# Patient Record
Sex: FEMALE | Race: WHITE | NOT HISPANIC OR LATINO | Employment: UNEMPLOYED | URBAN - METROPOLITAN AREA
[De-identification: names, ages, dates, MRNs, and addresses within clinical notes are randomized per-mention and may not be internally consistent; named-entity substitution may affect disease eponyms.]

---

## 2017-02-08 PROBLEM — Z30.2 ENCOUNTER FOR STERILIZATION: Status: ACTIVE | Noted: 2017-02-08

## 2017-03-02 ENCOUNTER — ANESTHESIA EVENT (OUTPATIENT)
Dept: OBSTETRICS AND GYNECOLOGY | Facility: HOSPITAL | Age: 32
End: 2017-03-02
Payer: COMMERCIAL

## 2017-03-03 ENCOUNTER — ANESTHESIA (OUTPATIENT)
Dept: OBSTETRICS AND GYNECOLOGY | Facility: HOSPITAL | Age: 32
End: 2017-03-03
Payer: COMMERCIAL

## 2017-03-03 ENCOUNTER — SURGERY (OUTPATIENT)
Age: 32
End: 2017-03-03

## 2017-03-03 ENCOUNTER — HOSPITAL ENCOUNTER (INPATIENT)
Facility: HOSPITAL | Age: 32
LOS: 2 days | Discharge: HOME | End: 2017-03-05
Attending: OBSTETRICS & GYNECOLOGY | Admitting: OBSTETRICS & GYNECOLOGY
Payer: COMMERCIAL

## 2017-03-03 DIAGNOSIS — Z98.891 H/O: C-SECTION: ICD-10-CM

## 2017-03-03 LAB
ABO + RH BLD: NORMAL
BASOPHILS # BLD AUTO: 0.02 K/UL
BASOPHILS NFR BLD: 0.2 %
BLD GP AB SCN CELLS X3 SERPL QL: NORMAL
DIFFERENTIAL METHOD: ABNORMAL
EOSINOPHIL # BLD AUTO: 0.3 K/UL
EOSINOPHIL NFR BLD: 2.9 %
ERYTHROCYTE [DISTWIDTH] IN BLOOD BY AUTOMATED COUNT: 14.4 %
HCT VFR BLD AUTO: 42.4 %
HGB BLD-MCNC: 14.6 G/DL
LYMPHOCYTES # BLD AUTO: 2.1 K/UL
LYMPHOCYTES NFR BLD: 18.7 %
MCH RBC QN AUTO: 28.1 PG
MCHC RBC AUTO-ENTMCNC: 34.4 %
MCV RBC AUTO: 82 FL
MONOCYTES # BLD AUTO: 1 K/UL
MONOCYTES NFR BLD: 8.7 %
NEUTROPHILS # BLD AUTO: 7.8 K/UL
NEUTROPHILS NFR BLD: 69.1 %
PLATELET # BLD AUTO: 244 K/UL
PMV BLD AUTO: 8.9 FL
RBC # BLD AUTO: 5.19 M/UL
WBC # BLD AUTO: 11.31 K/UL

## 2017-03-03 PROCEDURE — 27200688 HC TRAY, SPINAL-HYPER/ ISOBARIC: Performed by: NURSE ANESTHETIST, CERTIFIED REGISTERED

## 2017-03-03 PROCEDURE — 25000003 PHARM REV CODE 250: Performed by: OBSTETRICS & GYNECOLOGY

## 2017-03-03 PROCEDURE — 63600175 PHARM REV CODE 636 W HCPCS: Performed by: NURSE ANESTHETIST, CERTIFIED REGISTERED

## 2017-03-03 PROCEDURE — 11000001 HC ACUTE MED/SURG PRIVATE ROOM

## 2017-03-03 PROCEDURE — 63600175 PHARM REV CODE 636 W HCPCS: Performed by: OBSTETRICS & GYNECOLOGY

## 2017-03-03 PROCEDURE — 25000003 PHARM REV CODE 250: Performed by: NURSE ANESTHETIST, CERTIFIED REGISTERED

## 2017-03-03 PROCEDURE — 88302 TISSUE EXAM BY PATHOLOGIST: CPT | Mod: 26,,,

## 2017-03-03 PROCEDURE — 63600175 PHARM REV CODE 636 W HCPCS: Performed by: ANESTHESIOLOGY

## 2017-03-03 PROCEDURE — 37000008 HC ANESTHESIA 1ST 15 MINUTES: Performed by: OBSTETRICS & GYNECOLOGY

## 2017-03-03 PROCEDURE — 86850 RBC ANTIBODY SCREEN: CPT

## 2017-03-03 PROCEDURE — 27200918 HC ALEXIS O RING

## 2017-03-03 PROCEDURE — 25000003 PHARM REV CODE 250: Performed by: ANESTHESIOLOGY

## 2017-03-03 PROCEDURE — 59514 CESAREAN DELIVERY ONLY: CPT | Mod: ,,, | Performed by: ANESTHESIOLOGY

## 2017-03-03 PROCEDURE — 37000009 HC ANESTHESIA EA ADD 15 MINS: Performed by: OBSTETRICS & GYNECOLOGY

## 2017-03-03 PROCEDURE — 0UB70ZZ EXCISION OF BILATERAL FALLOPIAN TUBES, OPEN APPROACH: ICD-10-PCS | Performed by: OBSTETRICS & GYNECOLOGY

## 2017-03-03 PROCEDURE — 85025 COMPLETE CBC W/AUTO DIFF WBC: CPT

## 2017-03-03 PROCEDURE — 88302 TISSUE EXAM BY PATHOLOGIST: CPT

## 2017-03-03 PROCEDURE — 51702 INSERT TEMP BLADDER CATH: CPT

## 2017-03-03 PROCEDURE — 36000680 HC C/S TUBAL LIGATION LEVEL I

## 2017-03-03 PROCEDURE — 86900 BLOOD TYPING SEROLOGIC ABO: CPT

## 2017-03-03 PROCEDURE — 86592 SYPHILIS TEST NON-TREP QUAL: CPT

## 2017-03-03 RX ORDER — ONDANSETRON 8 MG/1
8 TABLET, ORALLY DISINTEGRATING ORAL EVERY 8 HOURS PRN
Status: DISCONTINUED | OUTPATIENT
Start: 2017-03-03 | End: 2017-03-05 | Stop reason: HOSPADM

## 2017-03-03 RX ORDER — BISACODYL 10 MG
10 SUPPOSITORY, RECTAL RECTAL ONCE AS NEEDED
Status: ACTIVE | OUTPATIENT
Start: 2017-03-03 | End: 2017-03-03

## 2017-03-03 RX ORDER — AMOXICILLIN 250 MG
1 CAPSULE ORAL NIGHTLY PRN
Status: DISCONTINUED | OUTPATIENT
Start: 2017-03-03 | End: 2017-03-05 | Stop reason: HOSPADM

## 2017-03-03 RX ORDER — SODIUM CITRATE AND CITRIC ACID MONOHYDRATE 334; 500 MG/5ML; MG/5ML
30 SOLUTION ORAL ONCE
Status: COMPLETED | OUTPATIENT
Start: 2017-03-03 | End: 2017-03-03

## 2017-03-03 RX ORDER — SIMETHICONE 80 MG
1 TABLET,CHEWABLE ORAL EVERY 6 HOURS PRN
Status: DISCONTINUED | OUTPATIENT
Start: 2017-03-03 | End: 2017-03-05 | Stop reason: HOSPADM

## 2017-03-03 RX ORDER — PHENYLEPHRINE HYDROCHLORIDE 10 MG/ML
INJECTION INTRAVENOUS
Status: DISCONTINUED | OUTPATIENT
Start: 2017-03-03 | End: 2017-03-03

## 2017-03-03 RX ORDER — HYDROCORTISONE 25 MG/G
CREAM TOPICAL 3 TIMES DAILY PRN
Status: DISCONTINUED | OUTPATIENT
Start: 2017-03-03 | End: 2017-03-05 | Stop reason: HOSPADM

## 2017-03-03 RX ORDER — BUPIVACAINE HYDROCHLORIDE 7.5 MG/ML
INJECTION, SOLUTION INTRASPINAL
Status: DISCONTINUED | OUTPATIENT
Start: 2017-03-03 | End: 2017-03-03

## 2017-03-03 RX ORDER — ONDANSETRON 2 MG/ML
4 INJECTION INTRAMUSCULAR; INTRAVENOUS EVERY 12 HOURS PRN
Status: DISCONTINUED | OUTPATIENT
Start: 2017-03-03 | End: 2017-03-04

## 2017-03-03 RX ORDER — CEFAZOLIN SODIUM 2 G/50ML
2 SOLUTION INTRAVENOUS
Status: COMPLETED | OUTPATIENT
Start: 2017-03-03 | End: 2017-03-03

## 2017-03-03 RX ORDER — OXYTOCIN 10 [USP'U]/ML
INJECTION, SOLUTION INTRAMUSCULAR; INTRAVENOUS
Status: DISCONTINUED | OUTPATIENT
Start: 2017-03-03 | End: 2017-03-03

## 2017-03-03 RX ORDER — SODIUM CHLORIDE, SODIUM LACTATE, POTASSIUM CHLORIDE, CALCIUM CHLORIDE 600; 310; 30; 20 MG/100ML; MG/100ML; MG/100ML; MG/100ML
INJECTION, SOLUTION INTRAVENOUS CONTINUOUS
Status: DISCONTINUED | OUTPATIENT
Start: 2017-03-03 | End: 2017-03-03

## 2017-03-03 RX ORDER — DIPHENHYDRAMINE HYDROCHLORIDE 50 MG/ML
12.5 INJECTION INTRAMUSCULAR; INTRAVENOUS EVERY 4 HOURS PRN
Status: DISCONTINUED | OUTPATIENT
Start: 2017-03-03 | End: 2017-03-04

## 2017-03-03 RX ORDER — ONDANSETRON HYDROCHLORIDE 2 MG/ML
INJECTION, SOLUTION INTRAMUSCULAR; INTRAVENOUS
Status: DISCONTINUED | OUTPATIENT
Start: 2017-03-03 | End: 2017-03-03

## 2017-03-03 RX ORDER — OXYCODONE AND ACETAMINOPHEN 10; 325 MG/1; MG/1
1 TABLET ORAL EVERY 4 HOURS PRN
Status: DISCONTINUED | OUTPATIENT
Start: 2017-03-03 | End: 2017-03-05 | Stop reason: HOSPADM

## 2017-03-03 RX ORDER — HYDROMORPHONE HCL IN 0.9% NACL 6 MG/30 ML
PATIENT CONTROLLED ANALGESIA SYRINGE INTRAVENOUS CONTINUOUS
Status: DISCONTINUED | OUTPATIENT
Start: 2017-03-03 | End: 2017-03-04

## 2017-03-03 RX ORDER — IBUPROFEN 400 MG/1
800 TABLET ORAL EVERY 8 HOURS
Status: DISCONTINUED | OUTPATIENT
Start: 2017-03-04 | End: 2017-03-05 | Stop reason: HOSPADM

## 2017-03-03 RX ORDER — METOCLOPRAMIDE HYDROCHLORIDE 5 MG/ML
10 INJECTION INTRAMUSCULAR; INTRAVENOUS ONCE
Status: COMPLETED | OUTPATIENT
Start: 2017-03-03 | End: 2017-03-03

## 2017-03-03 RX ORDER — DIPHENHYDRAMINE HCL 25 MG
25 CAPSULE ORAL EVERY 4 HOURS PRN
Status: DISCONTINUED | OUTPATIENT
Start: 2017-03-03 | End: 2017-03-05 | Stop reason: HOSPADM

## 2017-03-03 RX ORDER — ADHESIVE BANDAGE
30 BANDAGE TOPICAL 2 TIMES DAILY PRN
Status: DISCONTINUED | OUTPATIENT
Start: 2017-03-04 | End: 2017-03-05 | Stop reason: HOSPADM

## 2017-03-03 RX ORDER — FENTANYL CITRATE 50 UG/ML
INJECTION, SOLUTION INTRAMUSCULAR; INTRAVENOUS
Status: DISCONTINUED | OUTPATIENT
Start: 2017-03-03 | End: 2017-03-03

## 2017-03-03 RX ORDER — FAMOTIDINE 10 MG/ML
20 INJECTION INTRAVENOUS ONCE
Status: COMPLETED | OUTPATIENT
Start: 2017-03-03 | End: 2017-03-03

## 2017-03-03 RX ORDER — SODIUM CHLORIDE, SODIUM LACTATE, POTASSIUM CHLORIDE, CALCIUM CHLORIDE 600; 310; 30; 20 MG/100ML; MG/100ML; MG/100ML; MG/100ML
INJECTION, SOLUTION INTRAVENOUS CONTINUOUS
Status: ACTIVE | OUTPATIENT
Start: 2017-03-03 | End: 2017-03-03

## 2017-03-03 RX ORDER — NALOXONE HCL 0.4 MG/ML
0.02 VIAL (ML) INJECTION
Status: DISCONTINUED | OUTPATIENT
Start: 2017-03-03 | End: 2017-03-04

## 2017-03-03 RX ORDER — KETOROLAC TROMETHAMINE 30 MG/ML
30 INJECTION, SOLUTION INTRAMUSCULAR; INTRAVENOUS EVERY 6 HOURS
Status: COMPLETED | OUTPATIENT
Start: 2017-03-03 | End: 2017-03-04

## 2017-03-03 RX ORDER — OXYCODONE AND ACETAMINOPHEN 5; 325 MG/1; MG/1
1 TABLET ORAL EVERY 4 HOURS PRN
Status: DISCONTINUED | OUTPATIENT
Start: 2017-03-03 | End: 2017-03-05 | Stop reason: HOSPADM

## 2017-03-03 RX ORDER — OXYTOCIN/RINGER'S LACTATE 20/1000 ML
41.65 PLASTIC BAG, INJECTION (ML) INTRAVENOUS CONTINUOUS
Status: ACTIVE | OUTPATIENT
Start: 2017-03-03 | End: 2017-03-03

## 2017-03-03 RX ORDER — DOCUSATE SODIUM 100 MG/1
200 CAPSULE, LIQUID FILLED ORAL 2 TIMES DAILY
Status: DISCONTINUED | OUTPATIENT
Start: 2017-03-03 | End: 2017-03-05 | Stop reason: HOSPADM

## 2017-03-03 RX ORDER — MISOPROSTOL 200 UG/1
800 TABLET ORAL
Status: DISCONTINUED | OUTPATIENT
Start: 2017-03-03 | End: 2017-03-03

## 2017-03-03 RX ADMIN — BUPIVACAINE HYDROCHLORIDE IN DEXTROSE 1.6 ML: 7.5 INJECTION, SOLUTION SUBARACHNOID at 07:03

## 2017-03-03 RX ADMIN — KETOROLAC TROMETHAMINE 30 MG: 30 INJECTION, SOLUTION INTRAMUSCULAR at 05:03

## 2017-03-03 RX ADMIN — CEFAZOLIN SODIUM 2 G: 2 SOLUTION INTRAVENOUS at 07:03

## 2017-03-03 RX ADMIN — OXYTOCIN 30 UNITS: 10 INJECTION, SOLUTION INTRAMUSCULAR; INTRAVENOUS at 08:03

## 2017-03-03 RX ADMIN — FENTANYL CITRATE 40 MCG: 50 INJECTION INTRAMUSCULAR; INTRAVENOUS at 08:03

## 2017-03-03 RX ADMIN — OXYTOCIN 10 UNITS: 10 INJECTION, SOLUTION INTRAMUSCULAR; INTRAVENOUS at 08:03

## 2017-03-03 RX ADMIN — KETOROLAC TROMETHAMINE 30 MG: 30 INJECTION, SOLUTION INTRAMUSCULAR at 12:03

## 2017-03-03 RX ADMIN — FENTANYL CITRATE 10 MCG: 50 INJECTION INTRAMUSCULAR; INTRAVENOUS at 07:03

## 2017-03-03 RX ADMIN — SODIUM CHLORIDE, SODIUM LACTATE, POTASSIUM CHLORIDE, AND CALCIUM CHLORIDE: .6; .31; .03; .02 INJECTION, SOLUTION INTRAVENOUS at 08:03

## 2017-03-03 RX ADMIN — DOCUSATE SODIUM 200 MG: 100 CAPSULE, LIQUID FILLED ORAL at 08:03

## 2017-03-03 RX ADMIN — SODIUM CHLORIDE, SODIUM LACTATE, POTASSIUM CHLORIDE, AND CALCIUM CHLORIDE: .6; .31; .03; .02 INJECTION, SOLUTION INTRAVENOUS at 06:03

## 2017-03-03 RX ADMIN — FENTANYL CITRATE 50 MCG: 50 INJECTION INTRAMUSCULAR; INTRAVENOUS at 08:03

## 2017-03-03 RX ADMIN — ONDANSETRON 4 MG: 2 INJECTION, SOLUTION INTRAMUSCULAR; INTRAVENOUS at 08:03

## 2017-03-03 RX ADMIN — Medication: at 09:03

## 2017-03-03 RX ADMIN — SODIUM CITRATE AND CITRIC ACID MONOHYDRATE 30 ML: 500; 334 SOLUTION ORAL at 06:03

## 2017-03-03 RX ADMIN — PHENYLEPHRINE HYDROCHLORIDE 100 MCG: 10 INJECTION INTRAVENOUS at 07:03

## 2017-03-03 RX ADMIN — FAMOTIDINE 20 MG: 10 INJECTION, SOLUTION INTRAVENOUS at 06:03

## 2017-03-03 RX ADMIN — OXYTOCIN 20 UNITS: 10 INJECTION, SOLUTION INTRAMUSCULAR; INTRAVENOUS at 08:03

## 2017-03-03 RX ADMIN — SODIUM CHLORIDE, SODIUM LACTATE, POTASSIUM CHLORIDE, AND CALCIUM CHLORIDE 1000 ML: .6; .31; .03; .02 INJECTION, SOLUTION INTRAVENOUS at 05:03

## 2017-03-03 RX ADMIN — METOCLOPRAMIDE 10 MG: 5 INJECTION, SOLUTION INTRAMUSCULAR; INTRAVENOUS at 06:03

## 2017-03-03 RX ADMIN — DOCUSATE SODIUM 200 MG: 100 CAPSULE, LIQUID FILLED ORAL at 09:03

## 2017-03-03 RX ADMIN — PHENYLEPHRINE HYDROCHLORIDE 100 MCG: 10 INJECTION INTRAVENOUS at 08:03

## 2017-03-03 NOTE — L&D DELIVERY NOTE
Section Procedure Note    Indications: previous uterine incision LTCS x 2, declines , desires permanent sterility    Pre-operative Diagnosis: 39 week 3 day pregnancy.    Post-operative Diagnosis: same    Surgeon: My Villafuerte     Assistants: Kaden    Anesthesia: Spinal anesthesia    ASA Class: 1    Procedure Details   The patient was seen in the Holding Room. The risks, benefits, complications, treatment options, and expected outcomes were discussed with the patient.  The patient concurred with the proposed plan, giving informed consent.  The site of surgery properly noted/marked. The patient was taken to Operating Room # 1, identified as Katelin Head and the procedure verified as  Delivery. A Time Out was held and the above information confirmed.    After induction of anesthesia, the patient was draped and prepped in the usual sterile manner. A Pfannenstiel incision was made and carried down through the subcutaneous tissue to the fascia. Fascial incision was made and extended transversely. The fascia was  from the underlying rectus tissue superiorly and inferiorly. The peritoneum was identified and entered. Peritoneal incision was extended longitudinally.  A low transverse uterine incision was made. Delivered from vertex presentation was a male with Apgar scores of 9 at one minute and 9 at five minutes. After the umbilical cord was clamped and cut cord blood was obtained for evaluation. The placenta was removed intact and appeared normal. The uterine outline, tubes and ovaries appeared normal. The uterine incision was closed with running locked sutures of #1 chromic. Hemostasis was observed. Attention was turned to the left fallopian tube which was followed out to the fimbriated end.  It was elevated with a Pompey clamp.  A 3 cm segment of tube was doubly suture ligated with 2-0 chromic and excised.  In a similar fashion, the right tube was ligated.  Lavage was carried  out until clear. The peritoneum was then reapproximated with 2-0 vicryl.  The fascia was then reapproximated with running sutures of 0 PDS. The subcutaneous tissue was reapproximated with 3-0 plain gut.  The skin was reapproximated with staples.    Instrument, sponge, and needle counts were correct prior the abdominal closure and at the conclusion of the case.     Findings:  Viable male infant in cephalic presentation, wt pending, Apgars 9.9, normal uterus, tubes, ovaries, dense adhesions of the fascia to the underlying rectus muscles.      Estimated Blood Loss:  600 cc           UOP: 400 cc, clear           Total IV Fluids: 2000 ml           Specimens: bilateral segments of fallopian tubes           Complications:  None; patient tolerated the procedure well.           Disposition: PACU - hemodynamically stable.           Condition: stable

## 2017-03-03 NOTE — LACTATION NOTE
"This note was copied from a baby's chart.     03/03/17 0910   Maternal Infant Assessment   Breast Density Bilateral:;soft   Areola Bilateral:;firm   Nipple(s) Bilateral:;graspable;everted   Infant Assessment   Sucking Reflex present   Rooting Reflex present   Swallow Reflex present   LATCH Score   Latch 2-->grasps breast, tongue down, lips flanged, rhythmic sucking   Audible Swallowing 1-->a few with stimulation   Type Of Nipple 2-->everted (after stimulation)   Comfort (Breast/Nipple) 2-->soft/nontender   Hold (Positioning) 0-->full assist (staff holds infant at breast)   Score (less than 7 for 2/more consecutive times, consult Lactation Consultant) 7   Maternal Infant Feeding   Maternal Emotional State assist needed;relaxed   Infant Positioning clutch/"football"   Signs of Milk Transfer audible swallow;infant jaw motion present   Presence of Pain no   Time Spent (min) 15-30 min   Latch Assistance yes   Breastfeeding Education adequate infant intake;adequate milk volume;importance of skin-to-skin contact    Following Delivery yes   Breastfeeding History   Breastfeeding History yes   Previous Exclusive Breastfeeding yes   Previous Breastfeeding Success successful   Duration of Previous Breastfeeding 2 months   Infant First Feeding   Infant First Feeding breastfeeding   Breastfeeding Start Date 03/03/17   Breastfeeding Start Time 0910   Skin-to-Skin Contact Maintained   Breastfeeding breastfeeding, bilateral   Breastfeeding Left Side (min) 10 Min  (continues to nurse)   Breastfeeding Right Side (min) 10 Min   Feeding Infant   Feeding Readiness Cues eager;hand to mouth movements;rooting;smacking;crying   Feeding Tolerance/Success adequate pause for breath;alert for feeding;coordinated suck;coordinated swallow;strong suck   Effective Latch During Feeding yes   Audible Swallow yes   Suck/Swallow Coordination present   Skin-to-Skin Contact During Feeding yes   Lactation Referrals   Lactation Consult " Breastfeeding assessment;Initial assessment;Knowledge deficit   Lactation Interventions   Attachment Promotion breastfeeding assistance provided;counseling provided;environment adjusted;face-to-face positioning promoted;family involvement promoted;infant-mother separation minimized;privacy provided;role responsibility promoted;rooming-in promoted;skin-to-skin contact encouraged   Latch Promotion positioning assisted;infant moved to breast   Infant fussy and aggressive at breast; Fed on and off on left breast for 10 min then switched to right; On and off on right breast as well; Unlatches but re-latches with little assistance; Mother denies pain or discomfort with nursing; Reviewed breastfeeding basics with parents; Encouraged to feed infant on cue, at least 8 or more times in 24 hours; Phone number provided; Encouraged to call lactation for needs or assistance; Verbalized understanding with good recall

## 2017-03-03 NOTE — TRANSFER OF CARE
"Anesthesia Transfer of Care Note    Patient: Katelin Head    Procedure(s) Performed: Procedure(s) (LRB):  DELIVERY- SECTION WITH TUBAL (N/A)    Patient location: Labor and Delivery    Anesthesia Type: spinal    Transport from OR: Transported from OR on room air with adequate spontaneous ventilation    Post pain: adequate analgesia    Post assessment: no apparent anesthetic complications    Post vital signs: stable    Level of consciousness: awake, alert and oriented    Nausea/Vomiting: no nausea/vomiting    Complications: none          Last vitals:   Visit Vitals    BP (!) 153/64    Pulse 94    Temp 36.7 °C (98.1 °F) (Oral)    Resp 16    Ht 5' 8" (1.727 m)    Wt 104.3 kg (230 lb)    LMP 2016    SpO2 98%    Breastfeeding No    BMI 34.97 kg/m2     "

## 2017-03-03 NOTE — ANESTHESIA PREPROCEDURE EVALUATION
2017  Pre-operative evaluation for Procedure(s) (LRB):  DELIVERY- SECTION WITH TUBAL (N/A)    Katelin Head is a 31 y.o. female   OB History      Para Term  AB TAB SAB Ectopic Multiple Living    4 2 2  1  1   2            Patient Active Problem List   Diagnosis    Previous  delivery affecting pregnancy    wants BTL    H/O:        Review of patient's allergies indicates:  No Known Allergies    No current facility-administered medications on file prior to encounter.      Current Outpatient Prescriptions on File Prior to Encounter   Medication Sig Dispense Refill    PNV76-iron,carb&glu-FA-DSS-dha (CITRANATAL DHA, ALGAL OIL,) 27 mg iron-1 mg -50 mg-250 mg Cmpk Take 1 tablet by mouth once daily. 60 each 11    promethazine (PHENERGAN) 12.5 MG Tab Take 1 tablet (12.5 mg total) by mouth every 4 (four) hours as needed. 60 tablet 3       Past Surgical History:   Procedure Laterality Date     SECTION      x2       Social History     Social History    Marital status:      Spouse name: N/A    Number of children: N/A    Years of education: N/A     Occupational History    Not on file.     Social History Main Topics    Smoking status: Never Smoker    Smokeless tobacco: Not on file    Alcohol use No    Drug use: Not on file    Sexual activity: Yes     Birth control/ protection: Condom     Other Topics Concern    Not on file     Social History Narrative         Vital Signs Range (Last 24H):  Temp:  [36.4 °C (97.6 °F)]   Pulse:  []   Resp:  [18]   BP: (134-135)/(74-82)   SpO2:  [96 %-97 %]       CBC:   Recent Labs      17   0520   WBC  11.31   RBC  5.19   HGB  14.6   HCT  42.4   PLT  244   MCV  82   MCH  28.1   MCHC  34.4         [facility] Anesthesia Evaluation    I have reviewed the Patient Summary Reports.     I have reviewed the Medications.      Review of Systems  Anesthesia Hx:  No problems with previous Anesthesia Denies Hx of Anesthetic complications  History of prior surgery of interest to airway management or planning: Denies Family Hx of Anesthesia complications.   Denies Personal Hx of Anesthesia complications.   Social:  No Alcohol Use, Non-Smoker    Hematology/Oncology:  Hematology Normal   Oncology Normal     EENT/Dental:EENT/Dental Normal   Cardiovascular:  Cardiovascular Normal Exercise tolerance: good     Pulmonary:  Pulmonary Normal    Renal/:  Renal/ Normal     Hepatic/GI:  Hepatic/GI Normal    Neurological:  Neurology Normal    Endocrine:  Endocrine Normal    Psych:  Psychiatric Normal           Physical Exam  General:  Well nourished, Obesity    Airway/Jaw/Neck:  Airway Findings: Mouth Opening: Normal Tongue: Normal  General Airway Assessment: Adult, Average  Mallampati: II  TM Distance: Normal, at least 6 cm  Jaw/Neck Findings:  Neck ROM: Normal ROM      Dental:  Dental Findings: In tact   Chest/Lungs:  Chest/Lungs Findings: Clear to auscultation     Heart/Vascular:  Heart Findings: Rhythm: Regular Rhythm        Mental Status:  Mental Status Findings:  Alert and Oriented, Cooperative         Anesthesia Plan  Type of Anesthesia, risks & benefits discussed:  Anesthesia Type:  spinal  Patient's Preference:   Intra-op Monitoring Plan: standard ASA monitors  Intra-op Monitoring Plan Comments:   Post Op Pain Control Plan:   Post Op Pain Control Plan Comments:   Induction:    Beta Blocker:  Patient is not currently on a Beta-Blocker (No further documentation required).       Informed Consent: Patient understands risks and agrees with Anesthesia plan.  Questions answered. Anesthesia consent signed with patient.  ASA Score: 2     Day of Surgery Review of History & Physical:    H&P update referred to the surgeon.         Ready For Surgery From Anesthesia Perspective.

## 2017-03-03 NOTE — ANESTHESIA PROCEDURE NOTES
Spinal    Diagnosis: IUP  Patient location during procedure: OR  Start time: 3/3/2017 7:43 AM  Timeout: 3/3/2017 7:40 AM  End time: 3/3/2017 7:44 AM  Staffing  Anesthesiologist: NOLAN FONTANA  Performed by: anesthesiologist   Preanesthetic Checklist  Completed: patient identified, site marked, surgical consent, pre-op evaluation, timeout performed, IV checked, risks and benefits discussed and monitors and equipment checked  Spinal Block  Patient position: sitting  Prep: ChloraPrep  Patient monitoring: heart rate  Approach: midline  Location: L3-4  Injection technique: single shot  CSF Fluid: clear free-flowing CSF  Needle  Needle type: pencil-tip   Needle gauge: 25 G  Needle length: 3.5 in  Additional Documentation: incremental injection, no paresthesia on injection and negative aspiration for heme  Needle localization: anatomical landmarks  Assessment  Sensory level: T6   Dermatomal levels determined by pinch or prick  Ease of block: easy  Patient's tolerance of the procedure: no complaints and comfortable throughout block  Medications:  Bolus administered: 1.6 mL of with dextrose and 0.75 bupivacaine  Epinephrine added: none  Opioid administered: 10 mcg of   fentanyl

## 2017-03-03 NOTE — H&P
History and Physical - Obstetrics    Subjective:     Patient is a 31 y.o.  @ 39w2d gestational age with an Estimated Date of Delivery: 3/7/17, who presents with the complaint of: nothing. Fetal Movement: normal.    Her current obstetrical history is significant for h/o  x2, and desire for sterility.    Review of Systems  Nine system ROS negative except for HPI    PTA Medications   Medication Sig    PNV76-iron,carb&glu-FA-DSS-dha (CITRANATAL DHA, ALGAL OIL,) 27 mg iron-1 mg -50 mg-250 mg Cmpk Take 1 tablet by mouth once daily.    promethazine (PHENERGAN) 12.5 MG Tab Take 1 tablet (12.5 mg total) by mouth every 4 (four) hours as needed.       Review of patient's allergies indicates:  No Known Allergies     History reviewed. No pertinent past medical history.    Past Surgical History:   Procedure Laterality Date     SECTION      x2       OB History      Para Term  AB TAB SAB Ectopic Multiple Living    4 2 2  1  1   2      SABx1    Social History     Social History    Marital status:      Spouse name: N/A    Number of children: N/A    Years of education: N/A     Occupational History    Not on file.     Social History Main Topics    Smoking status: Never Smoker    Smokeless tobacco: Not on file    Alcohol use No    Drug use: Not on file    Sexual activity: Yes     Birth control/ protection: Condom     Other Topics Concern    Not on file     Social History Narrative       Family History   Problem Relation Age of Onset    Breast cancer Neg Hx     Colon cancer Neg Hx     Ovarian cancer Neg Hx        Objective:   Vital Signs (Most Recent)  Temp: 97.8 °F (36.6 °C) (17)  Pulse: 88 (17)  Resp: 18 (17)  BP: 118/72 (17 0720)  SpO2: 95 % (17)  Fetal Heart Tones: 150    General: no acute distress, alert and oriented to person, place and time  Abdomen: gravid, no palpable contractions  Incision(s): Pfan.  Extremities:  calves non-tender to palpation, no calf edema, erythema or palpable cords  Cervix: deferred    Laboratory: see results console    Assessment:     30 yo  @ 39w2d gestational age with h/o  x2 for repeat  and BTL    Plan:     To the OR  Ancef/SCDs

## 2017-03-03 NOTE — PROGRESS NOTES
Patient transferred to PP room by stretcher. Moved to PP bed with little assistance, tolerated well. Rebekah care provided, clean pads placed. Navarro emptied. PCA settings verified with 2nd RN. DUSTY Rogers RN to bedside for handoff. All belongings present at time of transfer.

## 2017-03-03 NOTE — PLAN OF CARE
Problem: Patient Care Overview  Goal: Plan of Care Review  Outcome: Ongoing (interventions implemented as appropriate)  VSS. Fundas and lochia WNL.  Patient resting comfortably in bed, voiding clear yellow urine through lock. Tolerating clear liquid diet. Dilaudid PCA as needed and toradol every 6 hours for pain.

## 2017-03-03 NOTE — ANESTHESIA POSTPROCEDURE EVALUATION
"Anesthesia Post Evaluation    Patient: Katelin Head    Procedure(s) Performed: Procedure(s) (LRB):  DELIVERY- SECTION WITH TUBAL (N/A)    Final Anesthesia Type: spinal  Patient location during evaluation: labor & delivery  Patient participation: Yes- Able to Participate  Level of consciousness: awake and alert and oriented  Post-procedure vital signs: reviewed and stable  Pain management: adequate  Airway patency: patent  PONV status at discharge: No PONV  Anesthetic complications: no      Cardiovascular status: blood pressure returned to baseline and hemodynamically stable  Respiratory status: unassisted, spontaneous ventilation and room air  Hydration status: euvolemic  Follow-up not needed.        Visit Vitals    /69 (BP Location: Left arm, Patient Position: Lying, BP Method: Automatic)    Pulse 85    Temp 36.4 °C (97.5 °F) (Oral)    Resp 16    Ht 5' 8" (1.727 m)    Wt 104.3 kg (230 lb)    LMP 2016    SpO2 98%    Breastfeeding Unknown    BMI 34.97 kg/m2       Pain/Sonya Score: Pain Rating Prior to Med Admin: 5 (3/3/2017 12:46 PM)  Pain Rating Post Med Admin: 3 (3/3/2017  1:15 PM)      "

## 2017-03-03 NOTE — IP AVS SNAPSHOT
[place] [address] [place] [address]  Phone: [phone]           Patient Discharge Instructions     Our goal is to set you up for success. This packet includes information on your condition, medications, and your home care. It will help you to care for yourself so you don't get sicker and need to go back to the hospital.     Please ask your nurse if you have any questions.        There are many details to remember when preparing to leave the hospital. Here is what you will need to do:    1. Take your medicine. If you are prescribed medications, review your Medication List in the following pages. You may have new medications to  at the pharmacy and others that you'll need to stop taking. Review the instructions for how and when to take your medications. Talk with your doctor or nurses if you are unsure of what to do.     2. Go to your follow-up appointments. Specific follow-up information is listed in the following pages. Your may be contacted by a transition nurse or clinical provider about future appointments. Be sure we have all of the phone numbers to reach you, if needed. Please contact your provider's office if you are unable to make an appointment.     3. Watch for warning signs. Your doctor or nurse will give you detailed warning signs to watch for and when to call for assistance. These instructions may also include educational information about your condition. If you experience any of warning signs to your health, call your doctor.               ** Verify the list of medication(s) below is accurate and up to date. Carry this with you in case of emergency. If your medications have changed, please notify your healthcare provider.             Medication List      START taking these medications        Additional Info                      ibuprofen 800 MG tablet   Commonly known as:  ADVIL,MOTRIN   Quantity:  40 tablet   Refills:  1   Dose:  800 mg    Last time this  was given:  800 mg on 3/5/2017  5:48 AM   Instructions:  Take 1 tablet (800 mg total) by mouth every 8 (eight) hours.     Begin Date    AM    Noon    PM    Bedtime       oxycodone-acetaminophen 5-325 mg per tablet   Commonly known as:  PERCOCET   Quantity:  50 tablet   Refills:  0   Dose:  1 tablet    Last time this was given:  1 tablet on 3/5/2017  5:48 AM   Instructions:  Take 1 tablet by mouth every 4 (four) hours as needed.     Begin Date    AM    Noon    PM    Bedtime         CONTINUE taking these medications        Additional Info                      PNV76-iron,carb,glu-FA-DSS-dha 27 mg iron-1 mg -50 mg-250 mg Cmpk   Commonly known as:  CITRANATAL DHA (ALGAL OIL)   Quantity:  60 each   Refills:  11   Dose:  1 tablet    Instructions:  Take 1 tablet by mouth once daily.     Begin Date    AM    Noon    PM    Bedtime       promethazine 12.5 MG Tab   Commonly known as:  PHENERGAN   Quantity:  60 tablet   Refills:  3   Dose:  12.5 mg    Instructions:  Take 1 tablet (12.5 mg total) by mouth every 4 (four) hours as needed.     Begin Date    AM    Noon    PM    Bedtime            Where to Get Your Medications      These medications were sent to [facility] [address]  [place] [address] [facility]  [address] [facility] [place] [address]     Phone:  [phone]     ibuprofen 800 MG tablet    oxycodone-acetaminophen 5-325 mg per tablet                  Please bring to all follow up appointments:    1. A copy of your discharge instructions.  2. All medicines you are currently taking in their original bottles.  3. Identification and insurance card.    Please arrive 15 minutes ahead of scheduled appointment time.    Please call 24 hours in advance if you must reschedule your appointment and/or time.        Your Scheduled Appointments     Mar 10, 2017  8:45 AM CST   Post Delivery with My Garg MD   [facility]    [address] [place] [id number] [phone]               Follow-up Information     Follow up with My Villafuerte MD In 1 week.    Specialty:  Obstetrics and Gynecology    Contact information:    [facility]  [address]  [place]  [phone]            Discharge Instructions       After a Cesearean Birth    General Discharge Instructions  · May follow a regular diet, unless otherwise discussed with physician.  · Take showers, not baths unless otherwise discussed with physician.  · Activity as tolerated.  · No lifting or heavy exercise for 6 weeks, no driving for 2 weeks, no sexual intercourse, douching or tampons for 6 weeks  · May return to work/school as discussed with physician  · Discuss birth control with physician  · Breast care support bra worn at all times  · Lactation consultant referral number ( [phone] or [phone])    Call Your Healthcare Provider Right Away If You Have:  · A fever of 101°F or higher.  · Incisional drainage  · Heavy vaginal bleeding, clots, or vaginal discharge with foul odor.  · Redness, discharge, or pain worse than you had in the hospital.  · Burning, pain, red streaks, or lumpy areas in your breasts.  · Cracks, blisters, or blood on your nipples.  · Burning or pain when you urinate.  · Persistent nausea or vomiting.  · Severe headaches, blurred vision, dizziness or fainting.  · Feelings of extreme sadness or anxiety, or a feeling that you dont want to be with your baby.  · No bowel movement for 5 days.  · Redness, warmth, swelling, or pain in the lower leg.    Incision Care  · You will be able to shower and pat the incision dry.  · Watch your incision for signs of infection, such as increasing redness or drainage.  · For ease of movement, hold a pillow against the incision when you get up from a lying or sitting position, and when you laugh or cough.  · Avoid heavy lifting--nothing heavier than your baby until your doctor instructs you otherwise.       Follow-Up  Schedule a  follow-up  exam with your healthcare provider for about 1-2 weeks after delivery. During this exam, your uterus and vaginal area will be checked. Contact your healthcare provider if you think you or your baby are having any problems.        Birth ()   A  birth is the surgical delivery of a baby through an incision in the mothers abdomen.  births may be planned and scheduled. But, in many cases, a  is unexpected. In any case, a  is done to ensure the safest birth for both you and your baby.     Preparing for the Birth   The preparation for the birth is nearly the same whether scheduled or unscheduled. Surgery will begin shortly after you receive anesthesia. You will receive either regional or general anesthesia. Most cesareans are completed in less than an hour. During the birth, your healthcare team is with you, ready to take care of you and your . Your partner may also be with you for the birth     Making the Incisions   In a  birth, incisions are made in both the skin and the uterus. Either incision may be transverse (from side to side) or vertical. Your skin and uterine incisions may differ. Be sure they are noted in your health records.   The skin incision is usually transverse (side to side). It is located at the pubic hairline. A vertical incision may be used if youve had this incision before or if the  needs to be done quickly.   The uterine incision is almost always transverse. A transverse incision heals very well. This may allow for a future vaginal birth (). In certain cases, a vertical uterine incision may be made.           Your Babys Birth   Once the incisions are made, the doctor presses on the top of the uterus and guides the baby through the incision. The cord will be clamped and cut. Then the placenta is lifted out through the incision.   Taking Care of You   After your babys birth, the uterine incision is closed with stitches.  Then, your skin incision will be closed with surgical staples or stitches and a dressing will be applied. Your doctor will press on your uterus. This helps expel blood clots through the vagina. You may be given medications to help shrink your uterus and decrease bleeding. You may also receive antibiotics to reduce any risk of infection.     Taking Care of Your Baby   While your surgery is completed, your baby will be placed in an infant warmer. Gentle suction will be used to help remove excess fluid from the babys mouth and airways. Then the APGAR score will be done. This rates babys appearance (color), pulse (heart rate), grimace (muscle reflex), activity, and respiration. Your baby will be wrapped in a blanket and brought to you. Now, for the first time, youll see your .     Risks of    As with any surgery,  birth has risks. Your doctor will discuss the risks of  with you. They may include:   Bleeding   Infection   Injury to nearby organs   Reaction to anesthesia      © 6609-8417 [employer]. [address] [place] [address]. All rights reserved. This information is not intended as a substitute for professional medical care. Always follow your healthcare professional's instructions.        Discharge Instructions for  Section ()   You had a  section, or . During the , your baby was delivered through a surgical incision in your abdomen and uterus. Full recovery after a  can take time. Its important to take care of yourself--for your own sake and because your new baby needs you. Here are some guidelines to follow at home.     Incision Care   Shower as needed. Pat your incision dry.   Watch your incision for signs of infection, such as increasing redness or drainage.   Hold a pillow against the incision when you laugh or cough and when you get up from a lying or sitting position.   Remember, it can take as  long as 6 weeks for a  incision to heal.    Activity   When to Call Your Doctor   Call your doctor right away if you have any of the following:   Fever of 100.4°F (38.0°C) or higher   Redness, pain, or drainage at your incision site   Repeated clots of blood (the size of a quarter or larger) passing from the vagina   Bleeding that requires a new sanitary pad every hour   Severe pain in the abdomen   Pain or urgency with urination   Trouble urinating or emptying your bladder   No bowel movement within 1 week after the birth of your baby      Dont try to take care of anyone other than your baby and yourself.   Remember, the more active you are, the more likely you are to have an increase in your bleeding.   Get lots of rest. Take naps in the afternoon.   Increase your activities gradually.   Plan your activities so that you dont have to go up or down stairs more than necessary.   Do postsurgical deep breathing and coughing exercises. Ask your doctor for instructions.   Dont lift anything heavier than your baby until your doctor tells you its okay.   Dont drive until your doctor says its okay.   Dont have sexual intercourse until after youve had a follow-up appointment with your doctor and youve decided on a birth control method.   Dont take a tub bath or use douches or tampons for 4 weeks.    Follow-Up   Make a follow-up appointment as directed by our staff.     © 1968-2137 [employer]. [address] [place] [address]. All rights reserved. This information is not intended as a substitute for professional medical care. Always follow your healthcare professional's instructions.    After a    It can take time to recover fully after a . Its important to take care of yourself--both for your own sake and because your new baby needs you.     Incision care   You will probably be able to shower and pat the incision dry.   Watch your incision for signs of infection.  These include redness that gets worse or fluid draining from it.   Hold a pillow against the incision when you get up from a lying or sitting position. Also do this when you laugh or cough.   Avoid heavy lifting. Dont lift anything heavier than your baby until your doctor tells you otherwise.    When to call your health care provider   Call your health care provider if you have:   A fever of 100.4°F (38.0°C or higher)   Redness, pain, or discharge at the incision site that gets worse   Repeated clots the size of a quarter or larger, passing from your vagina   You need to use a new sanitary pad every hour because of vaginal bleeding   Severe pain in your abdomen   No bowel movement within one week after the birth of your baby      © 6325-3895 [employer]. [address] [place] [address], [place] [address]. All rights reserved. This information is not intended as a substitute for professional medical care. Always follow your healthcare professional's instructions      Breast Care After Birth   A few days after your babys birth, your breasts will swell with milk. They are likely to feel tender and heavy. This is normal. To help prevent breast soreness and control irritation, follow these tips:       Coping with swelling   Use cold compresses or an ice pack to help reduce the ache or pain.   Breastfeed often to keep milk from clogging your breast ducts.   If your nipples are flat from breast swelling, hand express some milk. Squeeze out a few drops of milk by massaging and compressing your breasts.   If you have swelling with pain or fever, call your health care provider.  Preventing sore nipples   Make sure baby latches on to your breast correctly. The babys tongue should always be under your nipple, and your entire areola should be in the baby's mouth.   You can let milk dry on your nipples. This dried milk can protect the skin on your nipple/breasts.   Do not use alcohol, soap, or scented cleansers on your  breasts. These can cause the nipples to dry and crack.   Do not wear nursing pads that are lined with plastic. They hold in moisture and can cause chapping.   If you experience cracked or bleeding nipples, consult your doctor or a lactation consultant. He or she will ensure that your baby's latch is correct and may suggest topical treatment, such as pure lanolin.  Choosing a good bra   Wearing the right-sized bra is especially important now. If a bra is too tight, it may cause a duct in your breast to clog and become irritated. If possible, have a salesperson help fit you for a new bra. Look for one thats 100% cotton and comfortable. Also, choose a bra with wide straps that wont dig into your back and shoulders. If youre breastfeeding, find a nursing bra that allows you to uncover one breast at a time.   If you are not breastfeeding:   Avoid stimulation of nipples   Wear a tight-fitting bra   Apply ice packs for discomfort                  Breastfeeding discharge instructions given with First Alert form and reviewed.  Also discussed:   AAP recommendation of exclusive breastfeeding for the first 6 months of life and continued breastfeeding with the introduction of supplemental foods beyond the first year of life.  Instructed on the recommendation to delay all bottle and pacifier use until after 4 weeks of age and breastfeeding is well established.  Discussed the benefits of exclusive breastfeeding for both mother and baby.  Discussed the risks of supplementation/pacifier use on the exclusivity of breastfeeding in the first 6 months. Feed the baby at the earliest sign of hunger or comfort  o Hands to mouth, sucking motions  o Rooting or searching for something to suck on  o Dont wait for crying - it is a not a late sign of hunger; it is a sign of distress     The feedings may be 8-12 times per 24hrs and will not follow a schedule   Alternate the breast you start the feeding with, or start with the breast that  feels the fullest   Switch breasts when the baby takes himself off the breast or falls asleep   Keep offering breasts until the baby looks full, no longer gives hunger signs, and stays asleep when placed on his back in the crib   If the baby is sleepy and wont wake for a feeding, put the baby skin-to-skin dressed in a diaper against the mothers bare chest   Sleep near your baby   The baby should be positioned and latched on to the breast correctly  o Chest-to-chest, chin in the breast  o Babys lips are flipped outward  o Babys mouth is stretched open wide like a shout  o Babys sucking should feel like tugging to the mother  - The baby should be drinking at the breast:  o You should hear swallowing or gulping throughout the feeding  o You should see milk on the babys lips when he comes off the breast  o Your breasts should be softer when the baby is finished feeding  o The baby should look relaxed at the end of feedings  o After the 4th day and your milk is in:  o The babys poop should turn bright yellow and be loose, watery, and seedy  o The baby should have at least 3-4 poops the size of the palm of your hand per day  o The baby should have at least 6-8 wet diapers per day  o The urine should be light yellow in color  You should drink when you are thirsty and eat a healthy diet when you are    hungry.     Take naps to get the rest you need.   Take medications and/or drink alcohol only with permission of your obstetrician    or the babys pediatrician.  You can also call the [facility],   ([phone]), Monday-Friday, 8am-5pm Central time, to get the most   up-to-date evidence-based information on the use of medications during   pregnancy and breastfeeding.      The baby should be examined by a pediatrician at 3-5 days of age; unless ordered sooner by the pediatrician.  Once your milk comes in, the baby should be back to birth weight no later than 10-14 days of age.    Lactation Services -  [phone]    Instructed on primary engorgement and precautions.  Discussed:    Typical timing of the onset of engorgement  Signs and symptoms of engorgement  If the milk is flowing, use wet or dry heat applied to the breasts for approximately 10min prior to each feeding as a comfort measure to facilitate the milk ejection reflex    Follow heat treatment with breast massage to soften hard/lumpy areas of the breast    Use unrestricted, frequent, effective feedings    Wake baby to feed if necessary    Avoid pacifier and bottle feedings    Hand express or pump breasts to the point of comfort as needed    Use cold treatments in the form of ice packs/gel packs/ frozen vegetables wrapped in a soft thin cloth and applied to the breasts for approximately 20min after each feeding until engorgement is resolved    Wear comfortable, supportive bra    Take pain medicine as needed    Use anti-inflammatory medications if prescribed by physician    Recommendations for Sore Nipples        Initiate milk ejection reflex  and/or express drops of milk onto tip of nipple before putting baby to breast   Ensure correct positioning and deep, asymmetric latch  Chest to chest, chin in the breast  View video at www.[employer].com   Start feeding on least sore nipple/breast   Use different positions to nurse   Look for long, rhythmic sucking with a tugging sensation   Break suction to remove baby from breast if baby does not self-remove   Look at  nipple shape and color post -feeding   Discontinue use of soap or drying agents on nipples   Use comfort/healing measures:  breastmilk massaged into nipples, breastshells, lanolin, hydrogels, and/or breastpump   Encourage short, frequent feedings   Use measures to bonilla  flat/invert nipples prior to latch:  shells, pump, reverse pressure softening   Use measures to relieve engorgement:  heat/massage ac; frequent feedings; cold therapy pc   Avoid pacifier use   Change nursing pads  "frequently   Wash hands prior to touching nipples if tissue breakdown present   Take pain medicine compatible with breastfeeding as prescribed by doctor   Other   Call physician   Call lactation consultant          Primary Diagnosis     Your primary diagnosis was:  Previous  ( Delivery)      Admission Information     Date & Time Provider Department CSN    3/3/2017  5:05 AM My Garg MD [facility] [id number]      Care Providers     Provider Role Specialty Primary office phone    My Garg MD Attending Provider Obstetrics and Gynecology [phone]    My Garg MD Surgeon  Obstetrics and Gynecology [phone]      Your Vitals Were     BP Pulse Temp Resp Height Weight    126/79 79 96.1 °F (35.6 °C) (Axillary) 20 5' 8" (1.727 m) 104.3 kg (230 lb)    Last Period SpO2 BMI          2016 98% 34.97 kg/m2        Recent Lab Values     No lab values to display.      Pending Labs     Order Current Status    Specimen to Pathology - Surgery Collected (17 1004)    Specimen to Pathology - Surgery Collected (17 1004)      Allergies as of 3/5/2017     No Known Allergies      [facility]     [facility] Nurse Care Line -  Assistance  Unless otherwise directed by your provider, please contact [facility], our nurse care line that is available for  assistance.     Registered nurses in the [facility] provide clinical advisement, health education, appointment booking, and other advisory services.  Call for this free service at [phone].        Advance Directives     An advance directive is a document which, in the event you are no longer able to make decisions for yourself, tells your healthcare team what kind of treatment you do or do not want to receive, or who you would like to make those decisions for you.  If you do not currently have an advance directive, [facility] encourages you to create " one.  For more information call:  [phone]), [phone] ([phone]),  or log on to [email or web address].        Language Assistance Services     ATTENTION: Language assistance services are available, free of charge. Please call [phone].      ATENCIÓN: Si habla español, tiene a peralta disposición servicios gratuitos de asistencia lingüística. Llame al [phone].     [facility] Ý: N?u b?n nói Ti?ng Vi?t, có các d?ch v? h? tr? ngôn ng? mi?n phí dành cho b?n. G?i s? [phone].         [facility] complies with applicable Federal civil rights laws and does not discriminate on the basis of race, color, national origin, age, disability, or sex.

## 2017-03-03 NOTE — TREATMENT PLAN
Pt presents to unit ambulatory for sched c/s with sig other, no distress noted. Pt reports Hibiclens shower x 2, given Hibiclens for shower this AM, POC discussed. Verb ack

## 2017-03-04 LAB
BASOPHILS # BLD AUTO: 0.01 K/UL
BASOPHILS NFR BLD: 0.1 %
DIFFERENTIAL METHOD: ABNORMAL
EOSINOPHIL # BLD AUTO: 0.2 K/UL
EOSINOPHIL NFR BLD: 1.4 %
ERYTHROCYTE [DISTWIDTH] IN BLOOD BY AUTOMATED COUNT: 14.6 %
HCT VFR BLD AUTO: 41.8 %
HGB BLD-MCNC: 14.3 G/DL
LYMPHOCYTES # BLD AUTO: 1.7 K/UL
LYMPHOCYTES NFR BLD: 12.5 %
MCH RBC QN AUTO: 28.5 PG
MCHC RBC AUTO-ENTMCNC: 34.2 %
MCV RBC AUTO: 83 FL
MONOCYTES # BLD AUTO: 1.1 K/UL
MONOCYTES NFR BLD: 7.7 %
NEUTROPHILS # BLD AUTO: 10.8 K/UL
NEUTROPHILS NFR BLD: 78.1 %
PLATELET # BLD AUTO: 246 K/UL
PMV BLD AUTO: 9.2 FL
RBC # BLD AUTO: 5.01 M/UL
RPR SER QL: NORMAL
WBC # BLD AUTO: 13.81 K/UL

## 2017-03-04 PROCEDURE — 63600175 PHARM REV CODE 636 W HCPCS: Performed by: OBSTETRICS & GYNECOLOGY

## 2017-03-04 PROCEDURE — 11000001 HC ACUTE MED/SURG PRIVATE ROOM

## 2017-03-04 PROCEDURE — 25000003 PHARM REV CODE 250: Performed by: OBSTETRICS & GYNECOLOGY

## 2017-03-04 PROCEDURE — 36415 COLL VENOUS BLD VENIPUNCTURE: CPT

## 2017-03-04 PROCEDURE — 85025 COMPLETE CBC W/AUTO DIFF WBC: CPT

## 2017-03-04 RX ADMIN — KETOROLAC TROMETHAMINE 30 MG: 30 INJECTION, SOLUTION INTRAMUSCULAR at 12:03

## 2017-03-04 RX ADMIN — KETOROLAC TROMETHAMINE 30 MG: 30 INJECTION, SOLUTION INTRAMUSCULAR at 05:03

## 2017-03-04 RX ADMIN — DOCUSATE SODIUM 200 MG: 100 CAPSULE, LIQUID FILLED ORAL at 10:03

## 2017-03-04 RX ADMIN — OXYCODONE HYDROCHLORIDE AND ACETAMINOPHEN 1 TABLET: 5; 325 TABLET ORAL at 10:03

## 2017-03-04 RX ADMIN — DOCUSATE SODIUM 200 MG: 100 CAPSULE, LIQUID FILLED ORAL at 08:03

## 2017-03-04 RX ADMIN — OXYCODONE HYDROCHLORIDE AND ACETAMINOPHEN 1 TABLET: 10; 325 TABLET ORAL at 12:03

## 2017-03-04 RX ADMIN — IBUPROFEN 800 MG: 600 TABLET, FILM COATED ORAL at 01:03

## 2017-03-04 RX ADMIN — OXYCODONE HYDROCHLORIDE AND ACETAMINOPHEN 1 TABLET: 10; 325 TABLET ORAL at 05:03

## 2017-03-04 RX ADMIN — IBUPROFEN 800 MG: 600 TABLET, FILM COATED ORAL at 10:03

## 2017-03-04 RX ADMIN — OXYCODONE HYDROCHLORIDE AND ACETAMINOPHEN 1 TABLET: 10; 325 TABLET ORAL at 06:03

## 2017-03-04 RX ADMIN — SIMETHICONE CHEW TAB 80 MG 80 MG: 80 TABLET ORAL at 10:03

## 2017-03-04 NOTE — DISCHARGE INSTRUCTIONS
After a Cesearean Birth    General Discharge Instructions  · May follow a regular diet, unless otherwise discussed with physician.  · Take showers, not baths unless otherwise discussed with physician.  · Activity as tolerated.  · No lifting or heavy exercise for 6 weeks, no driving for 2 weeks, no sexual intercourse, douching or tampons for 6 weeks  · May return to work/school as discussed with physician  · Discuss birth control with physician  · Breast care support bra worn at all times  · Lactation consultant referral number ( [phone] or [phone])    Call Your Healthcare Provider Right Away If You Have:  · A fever of 101°F or higher.  · Incisional drainage  · Heavy vaginal bleeding, clots, or vaginal discharge with foul odor.  · Redness, discharge, or pain worse than you had in the hospital.  · Burning, pain, red streaks, or lumpy areas in your breasts.  · Cracks, blisters, or blood on your nipples.  · Burning or pain when you urinate.  · Persistent nausea or vomiting.  · Severe headaches, blurred vision, dizziness or fainting.  · Feelings of extreme sadness or anxiety, or a feeling that you dont want to be with your baby.  · No bowel movement for 5 days.  · Redness, warmth, swelling, or pain in the lower leg.    Incision Care  · You will be able to shower and pat the incision dry.  · Watch your incision for signs of infection, such as increasing redness or drainage.  · For ease of movement, hold a pillow against the incision when you get up from a lying or sitting position, and when you laugh or cough.  · Avoid heavy lifting--nothing heavier than your baby until your doctor instructs you otherwise.       Follow-Up  Schedule a  follow-up exam with your healthcare provider for about 1-2 weeks after delivery. During this exam, your uterus and vaginal area will be checked. Contact your healthcare provider if you think you or your baby are having any problems.        Birth  ()   A  birth is the surgical delivery of a baby through an incision in the mothers abdomen.  births may be planned and scheduled. But, in many cases, a  is unexpected. In any case, a  is done to ensure the safest birth for both you and your baby.     Preparing for the Birth   The preparation for the birth is nearly the same whether scheduled or unscheduled. Surgery will begin shortly after you receive anesthesia. You will receive either regional or general anesthesia. Most cesareans are completed in less than an hour. During the birth, your healthcare team is with you, ready to take care of you and your . Your partner may also be with you for the birth     Making the Incisions   In a  birth, incisions are made in both the skin and the uterus. Either incision may be transverse (from side to side) or vertical. Your skin and uterine incisions may differ. Be sure they are noted in your health records.   The skin incision is usually transverse (side to side). It is located at the pubic hairline. A vertical incision may be used if youve had this incision before or if the  needs to be done quickly.   The uterine incision is almost always transverse. A transverse incision heals very well. This may allow for a future vaginal birth (). In certain cases, a vertical uterine incision may be made.           Your Babys Birth   Once the incisions are made, the doctor presses on the top of the uterus and guides the baby through the incision. The cord will be clamped and cut. Then the placenta is lifted out through the incision.   Taking Care of You   After your babys birth, the uterine incision is closed with stitches. Then, your skin incision will be closed with surgical staples or stitches and a dressing will be applied. Your doctor will press on your uterus. This helps expel blood clots through the vagina. You may be given medications to help shrink your  uterus and decrease bleeding. You may also receive antibiotics to reduce any risk of infection.     Taking Care of Your Baby   While your surgery is completed, your baby will be placed in an infant warmer. Gentle suction will be used to help remove excess fluid from the babys mouth and airways. Then the APGAR score will be done. This rates babys appearance (color), pulse (heart rate), grimace (muscle reflex), activity, and respiration. Your baby will be wrapped in a blanket and brought to you. Now, for the first time, youll see your .     Risks of    As with any surgery,  birth has risks. Your doctor will discuss the risks of  with you. They may include:   Bleeding   Infection   Injury to nearby organs   Reaction to anesthesia      © 3950-7243 [employer]. [address] [place] [address], [place] [address]. All rights reserved. This information is not intended as a substitute for professional medical care. Always follow your healthcare professional's instructions.        Discharge Instructions for  Section ()   You had a  section, or . During the , your baby was delivered through a surgical incision in your abdomen and uterus. Full recovery after a  can take time. Its important to take care of yourself--for your own sake and because your new baby needs you. Here are some guidelines to follow at home.     Incision Care   Shower as needed. Pat your incision dry.   Watch your incision for signs of infection, such as increasing redness or drainage.   Hold a pillow against the incision when you laugh or cough and when you get up from a lying or sitting position.   Remember, it can take as long as 6 weeks for a  incision to heal.    Activity   When to Call Your Doctor   Call your doctor right away if you have any of the following:   Fever of 100.4°F (38.0°C) or higher   Redness, pain, or drainage at your incision site    Repeated clots of blood (the size of a quarter or larger) passing from the vagina   Bleeding that requires a new sanitary pad every hour   Severe pain in the abdomen   Pain or urgency with urination   Trouble urinating or emptying your bladder   No bowel movement within 1 week after the birth of your baby      Dont try to take care of anyone other than your baby and yourself.   Remember, the more active you are, the more likely you are to have an increase in your bleeding.   Get lots of rest. Take naps in the afternoon.   Increase your activities gradually.   Plan your activities so that you dont have to go up or down stairs more than necessary.   Do postsurgical deep breathing and coughing exercises. Ask your doctor for instructions.   Dont lift anything heavier than your baby until your doctor tells you its okay.   Dont drive until your doctor says its okay.   Dont have sexual intercourse until after youve had a follow-up appointment with your doctor and youve decided on a birth control method.   Dont take a tub bath or use douches or tampons for 4 weeks.    Follow-Up   Make a follow-up appointment as directed by our staff.     © 0447-4557 [employer]. [address] [place] [address], [place] [address]. All rights reserved. This information is not intended as a substitute for professional medical care. Always follow your healthcare professional's instructions.    After a    It can take time to recover fully after a . Its important to take care of yourself--both for your own sake and because your new baby needs you.     Incision care   You will probably be able to shower and pat the incision dry.   Watch your incision for signs of infection. These include redness that gets worse or fluid draining from it.   Hold a pillow against the incision when you get up from a lying or sitting position. Also do this when you laugh or cough.   Avoid heavy lifting. Dont lift anything heavier  than your baby until your doctor tells you otherwise.    When to call your health care provider   Call your health care provider if you have:   A fever of 100.4°F (38.0°C or higher)   Redness, pain, or discharge at the incision site that gets worse   Repeated clots the size of a quarter or larger, passing from your vagina   You need to use a new sanitary pad every hour because of vaginal bleeding   Severe pain in your abdomen   No bowel movement within one week after the birth of your baby      © 4070-2744 [employer]. [address] [place] [address]. All rights reserved. This information is not intended as a substitute for professional medical care. Always follow your healthcare professional's instructions      Breast Care After Birth   A few days after your babys birth, your breasts will swell with milk. They are likely to feel tender and heavy. This is normal. To help prevent breast soreness and control irritation, follow these tips:       Coping with swelling   Use cold compresses or an ice pack to help reduce the ache or pain.   Breastfeed often to keep milk from clogging your breast ducts.   If your nipples are flat from breast swelling, hand express some milk. Squeeze out a few drops of milk by massaging and compressing your breasts.   If you have swelling with pain or fever, call your health care provider.  Preventing sore nipples   Make sure baby latches on to your breast correctly. The babys tongue should always be under your nipple, and your entire areola should be in the baby's mouth.   You can let milk dry on your nipples. This dried milk can protect the skin on your nipple/breasts.   Do not use alcohol, soap, or scented cleansers on your breasts. These can cause the nipples to dry and crack.   Do not wear nursing pads that are lined with plastic. They hold in moisture and can cause chapping.   If you experience cracked or bleeding nipples, consult your doctor or a lactation  consultant. He or she will ensure that your baby's latch is correct and may suggest topical treatment, such as pure lanolin.  Choosing a good bra   Wearing the right-sized bra is especially important now. If a bra is too tight, it may cause a duct in your breast to clog and become irritated. If possible, have a salesperson help fit you for a new bra. Look for one thats 100% cotton and comfortable. Also, choose a bra with wide straps that wont dig into your back and shoulders. If youre breastfeeding, find a nursing bra that allows you to uncover one breast at a time.   If you are not breastfeeding:   Avoid stimulation of nipples   Wear a tight-fitting bra   Apply ice packs for discomfort                  Breastfeeding discharge instructions given with First Alert form and reviewed.  Also discussed:   AAP recommendation of exclusive breastfeeding for the first 6 months of life and continued breastfeeding with the introduction of supplemental foods beyond the first year of life.  Instructed on the recommendation to delay all bottle and pacifier use until after 4 weeks of age and breastfeeding is well established.  Discussed the benefits of exclusive breastfeeding for both mother and baby.  Discussed the risks of supplementation/pacifier use on the exclusivity of breastfeeding in the first 6 months. Feed the baby at the earliest sign of hunger or comfort  o Hands to mouth, sucking motions  o Rooting or searching for something to suck on  o Dont wait for crying - it is a not a late sign of hunger; it is a sign of distress     The feedings may be 8-12 times per 24hrs and will not follow a schedule   Alternate the breast you start the feeding with, or start with the breast that feels the fullest   Switch breasts when the baby takes himself off the breast or falls asleep   Keep offering breasts until the baby looks full, no longer gives hunger signs, and stays asleep when placed on his back in the crib   If the  baby is sleepy and wont wake for a feeding, put the baby skin-to-skin dressed in a diaper against the mothers bare chest   Sleep near your baby   The baby should be positioned and latched on to the breast correctly  o Chest-to-chest, chin in the breast  o Babys lips are flipped outward  o Babys mouth is stretched open wide like a shout  o Babys sucking should feel like tugging to the mother  - The baby should be drinking at the breast:  o You should hear swallowing or gulping throughout the feeding  o You should see milk on the babys lips when he comes off the breast  o Your breasts should be softer when the baby is finished feeding  o The baby should look relaxed at the end of feedings  o After the 4th day and your milk is in:  o The babys poop should turn bright yellow and be loose, watery, and seedy  o The baby should have at least 3-4 poops the size of the palm of your hand per day  o The baby should have at least 6-8 wet diapers per day  o The urine should be light yellow in color  You should drink when you are thirsty and eat a healthy diet when you are    hungry.     Take naps to get the rest you need.   Take medications and/or drink alcohol only with permission of your obstetrician    or the babys pediatrician.  You can also call the [facility],   ([phone]), Monday-Friday, 8am-5pm Central time, to get the most   up-to-date evidence-based information on the use of medications during   pregnancy and breastfeeding.      The baby should be examined by a pediatrician at 3-5 days of age; unless ordered sooner by the pediatrician.  Once your milk comes in, the baby should be back to birth weight no later than 10-14 days of age.    Lactation Services - [phone]    Instructed on primary engorgement and precautions.  Discussed:    Typical timing of the onset of engorgement  Signs and symptoms of engorgement  If the milk is flowing, use wet or dry heat applied to the breasts for  approximately 10min prior to each feeding as a comfort measure to facilitate the milk ejection reflex    Follow heat treatment with breast massage to soften hard/lumpy areas of the breast    Use unrestricted, frequent, effective feedings    Wake baby to feed if necessary    Avoid pacifier and bottle feedings    Hand express or pump breasts to the point of comfort as needed    Use cold treatments in the form of ice packs/gel packs/ frozen vegetables wrapped in a soft thin cloth and applied to the breasts for approximately 20min after each feeding until engorgement is resolved    Wear comfortable, supportive bra    Take pain medicine as needed    Use anti-inflammatory medications if prescribed by physician    Recommendations for Sore Nipples        Initiate milk ejection reflex  and/or express drops of milk onto tip of nipple before putting baby to breast   Ensure correct positioning and deep, asymmetric latch  Chest to chest, chin in the breast  View video at [employer].com   Start feeding on least sore nipple/breast   Use different positions to nurse   Look for long, rhythmic sucking with a tugging sensation   Break suction to remove baby from breast if baby does not self-remove   Look at  nipple shape and color post -feeding   Discontinue use of soap or drying agents on nipples   Use comfort/healing measures:  breastmilk massaged into nipples, breastshells, lanolin, hydrogels, and/or breastpump   Encourage short, frequent feedings   Use measures to bonilla  flat/invert nipples prior to latch:  shells, pump, reverse pressure softening   Use measures to relieve engorgement:  heat/massage ac; frequent feedings; cold therapy pc   Avoid pacifier use   Change nursing pads frequently   Wash hands prior to touching nipples if tissue breakdown present   Take pain medicine compatible with breastfeeding as prescribed by doctor   Other   Call physician   Call lactation consultant

## 2017-03-04 NOTE — PROGRESS NOTES
PCA and Navarro discontinued.  Pt instructed to call for assistance prior to ambulation; understanding verbalized.

## 2017-03-04 NOTE — PLAN OF CARE
Problem: Patient Care Overview  Goal: Individualization & Mutuality  Outcome: Ongoing (interventions implemented as appropriate)  Pt. tolerating pain with scheduled motrin and prn percocet, pt. Prefers the 10 mg of percocet. Tolerating diet. Enc. Pt. To ambulate today. I&o today. poc reviewed with pt. And s.o. Bonding well with infant. Vss. Breastfeeding. Still not passing flatus at this time.

## 2017-03-04 NOTE — PROGRESS NOTES
Delivery Progress Note  Obstetrics        SUBJECTIVE:     Postpartum Day 1:  Delivery    Ms. Leonard states she feels well. She denies emotional concerns. Her pain is well controlled with current medications. The baby is well. The baby is feeding via breast. The patient is ambulating well. Ms. Leonard is tolerating a normal diet. Flatus has not been passed. Urinary output is adequate.    OBJECTIVE:     Vital Signs (Most Recent):  Temp: 98.3 °F (36.8 °C) (17)  Pulse: 75 (17)  Resp: 16 (17)  BP: 121/64 (17)  SpO2: 98 % (17)    Vital Signs Range (Last 24H):  Temp:  [96 °F (35.6 °C)-99.5 °F (37.5 °C)]   Pulse:  [75-92]   Resp:  [16-20]   BP: (121-138)/(64-80)     I & O (Last 24H):  Intake/Output Summary (Last 24 hours) at 17 1155  Last data filed at 17 0500   Gross per 24 hour   Intake                0 ml   Output             3600 ml   Net            -3600 ml     Physical Exam:  General:    no distress   Lungs:  clear to auscultation bilaterally   Heart:  regular rate and rhythm, S1, S2 normal, no murmur, click, rub or gallop   Breasts:  no discharge, erythema, or tenderness   Abdomen:  soft, non-tender; bowel sounds normal   Fundus:  firm   Incision:  healing well   Lochia:   moderate rubra   DVT Evaluation:  No evidence of DVT on either side seen on physical exam.     Hemoglobin/Hematocrit    Recent Labs  Lab 17  0529   HGB 14.3   HCT 41.8     ABO/Rh  Lab Results   Component Value Date    GROUPTRH A POS 2017     Rubella  No results for input(s): RUBELLAIGGSC in the last 168 hours.    ASSESSMENT/PLAN:     Status post  section. Doing well postoperatively.     Continue current care.

## 2017-03-04 NOTE — LACTATION NOTE
"This note was copied from a baby's chart.     03/04/17 0853   Maternal Infant Assessment   Breast Density Bilateral:;soft   Areola Bilateral:;elastic   Nipple(s) Bilateral:;graspable   Infant Assessment   Sucking Reflex present   Rooting Reflex present   Swallow Reflex present   LATCH Score   Latch 1-->repeated attempts, holds nipple in mouth, stimulate to suck   Audible Swallowing 2-->spontaneous and intermittent (24 hrs old)   Type Of Nipple 2-->everted (after stimulation)   Comfort (Breast/Nipple) 1-->filling, red/small blisters/bruises, mild/mod discomfort   Hold (Positioning) 1-->minimal assist, teach one side: mother does other, staff holds   Score (less than 7 for 2/more consecutive times, consult Lactation Consultant) 7   Maternal Infant Feeding   Maternal Emotional State relaxed;assist needed   Infant Positioning cradle   Signs of Milk Transfer audible swallow   Time Spent (min) 0-15 min   Latch Assistance yes   Infant First Feeding   Breastfeeding Left Side (min) 5 Min  (cont to nurse)   Feeding Infant   Effective Latch During Feeding yes   Audible Swallow yes   Suck/Swallow Coordination present   Lactation Referrals   Lactation Consult Breastfeeding assessment;Follow up;Knowledge deficit   Lactation Interventions   Attachment Promotion breastfeeding assistance provided   Latch Promotion positioning assisted;infant moved to breast   Minimal assist with position and latch to left breast in cradle hold; audible swallows noted.  Baby sleepy at breast and constant manual stimulation needed to maintain sucking.  C/O nipple tenderness 3/10; lanolin given and instructed on use.  Reviewed breastfeeding basics.  Mother further declined any assist at this time.  Encouraged to call for assist prn.  States "understand" and verbalized appropriate recall.  "

## 2017-03-04 NOTE — PLAN OF CARE
Problem: Patient Care Overview  Goal: Plan of Care Review  Outcome: Ongoing (interventions implemented as appropriate)  Plan of care reviewed with pt.  Pt remains active in all aspects of care.  All questions and concerns addressed.

## 2017-03-05 VITALS
DIASTOLIC BLOOD PRESSURE: 79 MMHG | WEIGHT: 230 LBS | OXYGEN SATURATION: 98 % | HEART RATE: 79 BPM | SYSTOLIC BLOOD PRESSURE: 126 MMHG | TEMPERATURE: 96 F | BODY MASS INDEX: 34.86 KG/M2 | RESPIRATION RATE: 20 BRPM | HEIGHT: 68 IN

## 2017-03-05 PROBLEM — Z30.2 ENCOUNTER FOR STERILIZATION: Status: RESOLVED | Noted: 2017-02-08 | Resolved: 2017-03-05

## 2017-03-05 PROCEDURE — 25000003 PHARM REV CODE 250: Performed by: OBSTETRICS & GYNECOLOGY

## 2017-03-05 RX ORDER — IBUPROFEN 800 MG/1
800 TABLET ORAL EVERY 8 HOURS
Qty: 40 TABLET | Refills: 1 | Status: SHIPPED | OUTPATIENT
Start: 2017-03-05 | End: 2023-10-09

## 2017-03-05 RX ORDER — OXYCODONE AND ACETAMINOPHEN 5; 325 MG/1; MG/1
1 TABLET ORAL EVERY 4 HOURS PRN
Qty: 50 TABLET | Refills: 0 | Status: SHIPPED | OUTPATIENT
Start: 2017-03-05 | End: 2023-10-09

## 2017-03-05 RX ADMIN — DOCUSATE SODIUM 200 MG: 100 CAPSULE, LIQUID FILLED ORAL at 08:03

## 2017-03-05 RX ADMIN — OXYCODONE HYDROCHLORIDE AND ACETAMINOPHEN 1 TABLET: 5; 325 TABLET ORAL at 05:03

## 2017-03-05 RX ADMIN — IBUPROFEN 800 MG: 600 TABLET, FILM COATED ORAL at 05:03

## 2017-03-05 NOTE — PROGRESS NOTES
Discharge teaching given to pt. With prescription education. Pt. verbalized understanding with good recall noted. Pt. enc. To call md office once discharged to schedule a f/u appt. and for any questions or concerns that may arise once discharged. No c/o pain at this time.

## 2017-03-05 NOTE — LACTATION NOTE
"This note was copied from a baby's chart.     03/05/17 0923   Maternal Infant Assessment   Breast Density Bilateral:;soft   Areola Bilateral:;elastic   Nipple(s) Bilateral:;everted   Maternal Infant Feeding   Maternal Emotional State relaxed;independent   Time Spent (min) 15-30 min   Lactation Referrals   Lactation Consult Follow up;Knowledge deficit     Reports breastfeeding well without complications.  Basic breastfeeding discharge instructions given.  Denies any c/o or concerns.  Encouraged to call hotline prn.  States "understand" and verbalized appropriate recall.  "

## 2017-03-05 NOTE — DISCHARGE SUMMARY
Delivery Discharge Summary  Obstetrics      Principle diagnosis:  Pregnancy     Conditions: h/o prev c/s    Hospital Course: Patient was admitted underwent a  section due to h/o prev c/s.  Her post-operative was uneventful.    Delivery:    Episiotomy: None   Lacerations:     Repair suture:     Repair # of packets: 8   Blood loss (ml): 0     Birth information:  YOB: 2019   Time of birth: 8:10 AM   Sex: male   Delivery type: , Low Transverse   Gestational Age: 39w3d    Delivery Clinician:      Other providers:       Additional  information:  Forceps:    Vacuum:    Breech:    Observed anomalies      Living?:           APGARS  One minute Five minutes Ten minutes   Skin color:         Heart rate:         Grimace:         Muscle tone:         Breathing:         Totals: 9  9        Placenta: Delivered:       appearance      Feeding Method: bottle    Rh Immune Globulin Given: no  Rubella Vaccine Given: no  Tdap Vaccine Given: yes    Discharge Date: 3/5/2017    Follow-up 1 week    Disposition:  Home    Patient Instructions:   Current Discharge Medication List      START taking these medications    Details   ibuprofen (ADVIL,MOTRIN) 800 MG tablet Take 1 tablet (800 mg total) by mouth every 8 (eight) hours.  Qty: 40 tablet, Refills: 1      oxycodone-acetaminophen (PERCOCET) 5-325 mg per tablet Take 1 tablet by mouth every 4 (four) hours as needed.  Qty: 50 tablet, Refills: 0         CONTINUE these medications which have NOT CHANGED    Details   PNV76-iron,carb&glu-FA-DSS-dha (CITRANATAL DHA, ALGAL OIL,) 27 mg iron-1 mg -50 mg-250 mg Cmpk Take 1 tablet by mouth once daily.  Qty: 60 each, Refills: 11      promethazine (PHENERGAN) 12.5 MG Tab Take 1 tablet (12.5 mg total) by mouth every 4 (four) hours as needed.  Qty: 60 tablet, Refills: 3             No discharge procedures on file.     Normal diet    Normal activity    No physical restrictions    Pelvic rest x 6 weeks.      Warren Alfonso  MD

## 2017-03-05 NOTE — PLAN OF CARE
Problem: Patient Care Overview  Goal: Plan of Care Review  Mother ambulating, tolerating PO well. Adequate bonding with baby and breastfeeding. Bowels are hypoactive, education provided to increase fluids, and ambulation.

## 2017-03-05 NOTE — PROGRESS NOTES
Delivery Progress Note  Obstetrics        SUBJECTIVE:     Postpartum Day 3:  Delivery    Ms. Leonard states she feels well. She denies emotional concerns. Her pain is well controlled with current medications. The baby is well. The baby is feeding via formula. The patient is ambulating well. Ms. Leonard is tolerating a normal diet. Flatus has been passed. Urinary output is adequate.    OBJECTIVE:     Vital Signs (Most Recent):  Temp: 96.1 °F (35.6 °C) (17)  Pulse: 79 (17)  Resp: 20 (17)  BP: 126/79 (17)  SpO2: 98 % (17)    Vital Signs Range (Last 24H):  Temp:  [96.1 °F (35.6 °C)-98.7 °F (37.1 °C)]   Pulse:  [79-88]   Resp:  [16-22]   BP: (113-133)/(66-83)     I & O (Last 24H):  Intake/Output Summary (Last 24 hours) at 17  Last data filed at 17 0400   Gross per 24 hour   Intake             3040 ml   Output             1800 ml   Net             1240 ml     Physical Exam:  General:    no distress   Lungs:  clear to auscultation bilaterally   Heart:  regular rate and rhythm, S1, S2 normal, no murmur, click, rub or gallop   Breasts:  no discharge, erythema, or tenderness   Abdomen:  soft, non-tender; bowel sounds normal   Fundus:  firm   Incision:  healing well   Lochia:   scant rubra   DVT Evaluation:  No evidence of DVT on either side seen on physical exam.     Hemoglobin/Hematocrit    Recent Labs  Lab 17  0529   HGB 14.3   HCT 41.8     ABO/Rh  Lab Results   Component Value Date    GROUPTRH A POS 2017     Rubella  No results for input(s): RUBELLAIGGSC in the last 168 hours.    ASSESSMENT/PLAN:     Status post  section. Doing well postoperatively.     Discharge home with standard precautions and return to clinic in one week.      Warren Alfonso MD

## 2017-03-07 ENCOUNTER — TELEPHONE (OUTPATIENT)
Dept: OBSTETRICS AND GYNECOLOGY | Facility: HOSPITAL | Age: 32
End: 2017-03-07

## 2017-03-08 ENCOUNTER — TELEPHONE (OUTPATIENT)
Dept: OBSTETRICS AND GYNECOLOGY | Facility: HOSPITAL | Age: 32
End: 2017-03-08

## 2017-03-08 NOTE — TELEPHONE ENCOUNTER
"Spoke to pt who states breastfeeding going well 'my only problem is bleeding on 1 side"--states baby latching well with not trouble on 1 side -the other side baby having difficulty and gets frustrated and does not latch well -  discussed hand expression or pumping to soften that side prior to latch -as milk has come in it has gotten worse -may need to breastfeed on the "good' side and pump the other for 24 hours to allow healing -using lanolin but suggested trying gel pads for healing -baby having adequate wet and dirty diapers -stools lighter in color -yellow-green and loose-will see pediatrician today -plan follow-up in next couple of days   "

## 2017-03-10 ENCOUNTER — TELEPHONE (OUTPATIENT)
Dept: OBSTETRICS AND GYNECOLOGY | Facility: HOSPITAL | Age: 32
End: 2017-03-10

## 2017-03-10 NOTE — TELEPHONE ENCOUNTER
"Lactation Telephone Follow up:  Spoke with pt.  Reports that breastfeeding is going much better.  Baby now latches to both breasts without difficulty.  Reports continued adequate output.  Denies any c/o or concerns.  Encouraged to call hotline prn.  States "understand" and verbalized recall.  "

## 2020-10-17 ENCOUNTER — CLINICAL SUPPORT (OUTPATIENT)
Dept: URGENT CARE | Facility: CLINIC | Age: 35
End: 2020-10-17
Payer: COMMERCIAL

## 2020-10-17 PROCEDURE — 90686 FLU VACCINE (QUAD) GREATER THAN OR EQUAL TO 3YO PRESERVATIVE FREE IM: ICD-10-PCS | Mod: S$GLB,,, | Performed by: PHYSICIAN ASSISTANT

## 2020-10-17 PROCEDURE — 90471 IMMUNIZATION ADMIN: CPT | Mod: S$GLB,,, | Performed by: PHYSICIAN ASSISTANT

## 2020-10-17 PROCEDURE — 90686 IIV4 VACC NO PRSV 0.5 ML IM: CPT | Mod: S$GLB,,, | Performed by: PHYSICIAN ASSISTANT

## 2020-10-17 PROCEDURE — 90471 FLU VACCINE (QUAD) GREATER THAN OR EQUAL TO 3YO PRESERVATIVE FREE IM: ICD-10-PCS | Mod: S$GLB,,, | Performed by: PHYSICIAN ASSISTANT

## 2021-04-02 ENCOUNTER — IMMUNIZATION (OUTPATIENT)
Dept: PRIMARY CARE CLINIC | Facility: CLINIC | Age: 36
End: 2021-04-02
Payer: COMMERCIAL

## 2021-04-02 DIAGNOSIS — Z23 NEED FOR VACCINATION: Primary | ICD-10-CM

## 2021-04-02 PROCEDURE — 91300 PR SARS-COV- 2 COVID-19 VACCINE, NO PRSV, 30MCG/0.3ML, IM: ICD-10-PCS | Mod: S$GLB,,, | Performed by: INTERNAL MEDICINE

## 2021-04-02 PROCEDURE — 91300 PR SARS-COV- 2 COVID-19 VACCINE, NO PRSV, 30MCG/0.3ML, IM: CPT | Mod: S$GLB,,, | Performed by: INTERNAL MEDICINE

## 2021-04-02 PROCEDURE — 0001A PR IMMUNIZ ADMIN, SARS-COV-2 COVID-19 VACC, 30MCG/0.3ML, 1ST DOSE: ICD-10-PCS | Mod: CV19,S$GLB,, | Performed by: INTERNAL MEDICINE

## 2021-04-02 PROCEDURE — 0001A PR IMMUNIZ ADMIN, SARS-COV-2 COVID-19 VACC, 30MCG/0.3ML, 1ST DOSE: CPT | Mod: CV19,S$GLB,, | Performed by: INTERNAL MEDICINE

## 2021-04-02 RX ADMIN — Medication 0.3 ML: at 08:04

## 2021-04-23 ENCOUNTER — IMMUNIZATION (OUTPATIENT)
Dept: PRIMARY CARE CLINIC | Facility: CLINIC | Age: 36
End: 2021-04-23
Payer: COMMERCIAL

## 2021-04-23 DIAGNOSIS — Z23 NEED FOR VACCINATION: Primary | ICD-10-CM

## 2021-04-23 PROCEDURE — 0002A PR IMMUNIZ ADMIN, SARS-COV-2 COVID-19 VACC, 30MCG/0.3ML, 2ND DOSE: ICD-10-PCS | Mod: CV19,S$GLB,, | Performed by: INTERNAL MEDICINE

## 2021-04-23 PROCEDURE — 0002A PR IMMUNIZ ADMIN, SARS-COV-2 COVID-19 VACC, 30MCG/0.3ML, 2ND DOSE: CPT | Mod: CV19,S$GLB,, | Performed by: INTERNAL MEDICINE

## 2021-04-23 PROCEDURE — 91300 PR SARS-COV- 2 COVID-19 VACCINE, NO PRSV, 30MCG/0.3ML, IM: CPT | Mod: S$GLB,,, | Performed by: INTERNAL MEDICINE

## 2021-04-23 PROCEDURE — 91300 PR SARS-COV- 2 COVID-19 VACCINE, NO PRSV, 30MCG/0.3ML, IM: ICD-10-PCS | Mod: S$GLB,,, | Performed by: INTERNAL MEDICINE

## 2021-04-23 RX ADMIN — Medication 0.3 ML: at 08:04

## 2021-07-12 NOTE — PLAN OF CARE
Problem: Postpartum ( Delivery) (Adult,Obstetrics,Pediatric)  Goal: Signs and Symptoms of Listed Potential Problems Will be Absent, Minimized or Managed (Postpartum)  Signs and symptoms of listed potential problems will be absent, minimized or managed by discharge/transition of care (reference Postpartum ( Delivery) (Adult,Obstetrics,Pediatric) CPG).   Outcome: Ongoing (interventions implemented as appropriate)  Pt maintaining adequate pain control.  PCA discontinued.  Navarro discontinued.  Pt up ambulating and voiding without complication. Exam WNL. Mother and infant bonding well and breastfeeding without complication. Encouraged pt to increase ambulation, and maintain a soft bland diet until +flatus.        Subjective: (As above and below)     Chief Complaint   Patient presents with    Follow-up     6 month     Side Pain     left side pain, only when sleep and when twisting body      Pan Swenson is a 56y.o. year old male who presents for     Hypertension ROS:  taking medications as instructed, no medication side effects noted, no TIAs, no chest pain on exertion, no dyspnea on exertion, no swelling of ankles    Diabetic Review of Systems - medication compliance: compliant all of the time, diabetic diet compliance: noncompliant some of the time, home glucose monitoring: is performed regularly. Eye exam: one week ago    Hyperlipidemia: tolerating statin    Smoker: quit    Systolic CHF: follows w/ cardiology, reports    Wt Readings from Last 3 Encounters:   21 239 lb (108.4 kg)   20 233 lb (105.7 kg)   20 229 lb (103.9 kg)         BPH: follows w/ urology    Side pain: L flank few weeks, no injury, improves w/ certain positions  No sob, chest pain        Reviewed PmHx, RxHx, FmHx, SocHx, AllgHx and updated in chart. Family History   Problem Relation Age of Onset    Heart Attack Mother         x2   Robret Mitchell Hypertension Mother     Prostate Cancer Father 76    Cancer Father 72        colon cancer    Hypertension Father     Cancer Sister     Hypertension Sister        Past Medical History:   Diagnosis Date    Hyperlipidemia     Hypertension     Lipoma of neck 2017    LVH (left ventricular hypertrophy)     Type 2 diabetes mellitus ([facility] [address]       Social History     Socioeconomic History    Marital status: SINGLE     Spouse name: Not on file    Number of children: Not on file    Years of education: Not on file    Highest education level: Not on file   Tobacco Use    Smoking status: Former Smoker     Quit date: 2012     Years since quittin.6    Smokeless tobacco: Never Used   Vaping Use    Vaping Use: Never used   Substance and Sexual Activity    Alcohol use:  Yes     Alcohol/week: 2.0 standard drinks     Types: 2 Cans of beer per week     Comment: 2 beers every other day    Drug use: No    Sexual activity: Yes     Partners: Female     Birth control/protection: Condom   Social History Narrative    Lives with his daughter, dropped out of high school in the 12th grade. Works as a cook. Social Determinants of Health     Financial Resource Strain:     Difficulty of Paying Living Expenses:    Food Insecurity:     Worried About Running Out of Food in the Last Year:     [address] in the Last Year:    Transportation Needs:     Lack of Transportation (Medical):  Lack of Transportation (Non-Medical):    Physical Activity:     Days of Exercise per Week:     Minutes of Exercise per Session:    Stress:     Feeling of Stress :    Social Connections:     Frequency of Communication with Friends and Family:     Frequency of Social Gatherings with Friends and Family:     Attends Taoist Services:     Active Member of Clubs or Organizations:     Attends Club or Organization Meetings:     Marital Status:           Current Outpatient Medications   Medication Sig    atorvastatin (LIPITOR) 80 mg tablet TAKE 1 TABLET BY MOUTH EVERY EVENING    tamsulosin (FLOMAX) 0.4 mg capsule TAKE 1 CAPSULE BY MOUTH DAILY    aspirin 81 mg chewable tablet TAKE 1 TABLET BY MOUTH EVERY DAY    metFORMIN (GLUCOPHAGE) 1,000 mg tablet TAKE 1 TABLET BY MOUTH TWICE DAILY WITH MEALS FOR DIABETES    lisinopril-hydroCHLOROthiazide (PRINZIDE, ZESTORETIC) 20-25 mg per tablet Take 1 Tab by mouth daily.  amLODIPine (NORVASC) 10 mg tablet TAKE 1 TABLET BY MOUTH DAILY FOR BLOOD PRESSURE    albuterol (PROVENTIL HFA, VENTOLIN HFA, PROAIR HFA) 90 mcg/actuation inhaler Take 1 Puff by inhalation every six (6) hours as needed for Wheezing.     latanoprost (XALATAN) 0.005 % ophthalmic solution INSTILL 1 DROP IN BOTH EYES EVERY DAY AT BEDTIME    cetirizine (ZYRTEC) 10 mg tablet Take 1 Tab by mouth daily as needed for Allergies.  tolterodine (DETROL) 2 mg tablet Take 2 mg by mouth two (2) times a day. (Patient not taking: Reported on 7/12/2021)     No current facility-administered medications for this visit. Review of Systems:   Constitutional:    Negative for fever and chills, negative diaphoresis. HEENT:              Negative for neck pain and stiffness. Eyes:                  Negative for visual disturbance, itching, redness or discharge. Respiratory:        Negative for cough and shortness of breath. Cardiovascular:  Negative for chest pain and palpitations. Gastrointestinal: Negative for nausea, vomiting, abdominal pain, diarrhea or constipation. Genitourinary:     Negative for dysuria and frequency. Musculoskeletal: Negative for falls, tenderness and swelling. Skin:                    Negative for rash, masses or lesions. Neurological:       Negative for dizzyness, seizure, loss of consciousness, weakness and numbness.      Objective:     Vitals:    07/12/21 1002   BP: 125/84   Pulse: 83   Resp: 18   Temp: 98.1 °F (36.7 °C)   TempSrc: Temporal   SpO2: 97%   Weight: 239 lb (108.4 kg)   Height: 5' 6\" (1.676 m)     Results for orders placed or performed in visit on 07/12/21   AMB POC HEMOGLOBIN A1C   Result Value Ref Range    Hemoglobin A1c (POC) 5.8 %   AMB POC GLUCOSE BLOOD, BY GLUCOSE MONITORING DEVICE   Result Value Ref Range    Glucose  MG/DL   AMB POC URINE, MICROALBUMIN, SEMIQUANT (3 RESULTS)   Result Value Ref Range    ALBUMIN, URINE POC 10 Negative mg/L    CREATININE, URINE  mg/dL    Microalbumin/creat ratio (POC) <30 <30 MG/G   AMB POC URINALYSIS DIP STICK AUTO W/O MICRO   Result Value Ref Range    Color (UA POC) Light Yellow     Clarity (UA POC) Clear     Glucose (UA POC) Negative Negative    Bilirubin (UA POC) Negative Negative    Ketones (UA POC) Negative Negative    Specific gravity (UA POC) 1.020 1.001 - 1.035    Blood (UA POC) Negative Negative    pH (UA POC) 6.0 4.6 - 8.0    Protein (UA POC) Negative Negative    Urobilinogen (UA POC) 0.2 mg/dL 0.2 - 1    Nitrites (UA POC) Negative Negative    Leukocyte esterase (UA POC) Negative Negative         Gen: Oriented to person, place and time and well-developed, well-nourished and in no distress. HEENT:    Head: normocephalic and atraumatic. Eyes:  EOM are normal. Pupils equal and round. Neck:  Normal range of motion. Neck supple. Cardiovascular: normal rate, regular rhythm and normal heart sounds. Pulmonary/Chest:  Effort normal and breath sounds normal.  No respiratory distress. No wheezes, no rales. Abdominal: soft, normal  bowel sounds. Musculoskeletal:  No edema, no tenderness. No calf tenderness or edema. Neurological:  Alert, oriented to person, place and time. Skin: skin is warm and dry. Diabetic foot exam:     Left Foot:   Visual Exam: normal    Pulse DP: 2+ (normal)   Filament test: normal sensation    Vibratory sensation: normal      Right Foot:   Visual Exam: normal    Pulse DP: 2+ (normal)   Filament test: normal sensation    Vibratory sensation: normal      Assessment/ Plan:       1. Controlled type 2 diabetes mellitus without complication, without long-term current use of insulin ([facility])    - AMB POC HEMOGLOBIN A1C  - AMB POC GLUCOSE BLOOD, BY GLUCOSE MONITORING DEVICE  - AMB POC URINE, MICROALBUMIN, SEMIQUANT (3 RESULTS)  -  DIABETES FOOT EXAM    2. Essential hypertension    - METABOLIC PANEL, BASIC; Future    3. Mixed hyperlipidemia      4. Left flank pain  Seems msk, ibup, fu INI  - AMB POC URINALYSIS DIP STICK AUTO W/O MICRO    5. Benign prostatic hyperplasia without lower urinary tract symptoms  Fu urology    6. Systolic CHF, chronic [facility])  Fu cardiology        I have discussed the diagnosis with the patient and the intended plan as seen in the above orders. The patient has received an after-visit summary and questions were answered concerning future plans.   Pt conveyed understanding of plan.      Medication Side Effects and Warnings were discussed with patient: yes  Patient Labs were reviewed: yes  Patient Past Records were reviewed:  yes    Maria Eugenia Brennan.  Maria L Velarde NP

## 2021-12-22 ENCOUNTER — IMMUNIZATION (OUTPATIENT)
Dept: PRIMARY CARE CLINIC | Facility: CLINIC | Age: 36
End: 2021-12-22
Payer: COMMERCIAL

## 2021-12-22 DIAGNOSIS — Z23 NEED FOR VACCINATION: Primary | ICD-10-CM

## 2021-12-22 PROCEDURE — 0004A COVID-19, MRNA, LNP-S, PF, 30 MCG/0.3 ML DOSE VACCINE: CPT | Mod: CV19,PBBFAC | Performed by: INTERNAL MEDICINE

## 2022-10-16 ENCOUNTER — CLINICAL SUPPORT (OUTPATIENT)
Dept: URGENT CARE | Facility: CLINIC | Age: 37
End: 2022-10-16
Payer: COMMERCIAL

## 2022-10-16 DIAGNOSIS — Z23 NEEDS FLU SHOT: Primary | ICD-10-CM

## 2022-10-16 PROCEDURE — 90471 IMMUNIZATION ADMIN: CPT | Mod: S$GLB,,,

## 2022-10-16 PROCEDURE — 90686 FLU VACCINE (QUAD) GREATER THAN OR EQUAL TO 3YO PRESERVATIVE FREE IM: ICD-10-PCS | Mod: S$GLB,,,

## 2022-10-16 PROCEDURE — 90471 FLU VACCINE (QUAD) GREATER THAN OR EQUAL TO 3YO PRESERVATIVE FREE IM: ICD-10-PCS | Mod: S$GLB,,,

## 2022-10-16 PROCEDURE — 90686 IIV4 VACC NO PRSV 0.5 ML IM: CPT | Mod: S$GLB,,,

## 2023-09-23 ENCOUNTER — IMMUNIZATION (OUTPATIENT)
Dept: INTERNAL MEDICINE | Facility: CLINIC | Age: 38
End: 2023-09-23
Payer: COMMERCIAL

## 2023-09-23 PROCEDURE — 90471 FLU VACCINE (QUAD) GREATER THAN OR EQUAL TO 3YO PRESERVATIVE FREE IM: ICD-10-PCS | Mod: S$GLB,,, | Performed by: INTERNAL MEDICINE

## 2023-09-23 PROCEDURE — 90471 IMMUNIZATION ADMIN: CPT | Mod: S$GLB,,, | Performed by: INTERNAL MEDICINE

## 2023-09-23 PROCEDURE — 90686 IIV4 VACC NO PRSV 0.5 ML IM: CPT | Mod: S$GLB,,, | Performed by: INTERNAL MEDICINE

## 2023-09-23 PROCEDURE — 90686 FLU VACCINE (QUAD) GREATER THAN OR EQUAL TO 3YO PRESERVATIVE FREE IM: ICD-10-PCS | Mod: S$GLB,,, | Performed by: INTERNAL MEDICINE

## 2023-10-06 ENCOUNTER — TELEPHONE (OUTPATIENT)
Dept: FAMILY MEDICINE | Facility: CLINIC | Age: 38
End: 2023-10-06
Payer: COMMERCIAL

## 2023-10-09 ENCOUNTER — OFFICE VISIT (OUTPATIENT)
Dept: FAMILY MEDICINE | Facility: CLINIC | Age: 38
End: 2023-10-09
Payer: COMMERCIAL

## 2023-10-09 VITALS
TEMPERATURE: 98 F | DIASTOLIC BLOOD PRESSURE: 64 MMHG | BODY MASS INDEX: 31.48 KG/M2 | HEIGHT: 68 IN | SYSTOLIC BLOOD PRESSURE: 130 MMHG | HEART RATE: 84 BPM | WEIGHT: 207.69 LBS | RESPIRATION RATE: 18 BRPM | OXYGEN SATURATION: 99 %

## 2023-10-09 DIAGNOSIS — M25.571 CHRONIC PAIN OF RIGHT ANKLE: ICD-10-CM

## 2023-10-09 DIAGNOSIS — Z12.31 SCREENING MAMMOGRAM FOR BREAST CANCER: ICD-10-CM

## 2023-10-09 DIAGNOSIS — Z12.31 BREAST CANCER SCREENING BY MAMMOGRAM: ICD-10-CM

## 2023-10-09 DIAGNOSIS — G89.29 CHRONIC PAIN OF RIGHT ANKLE: ICD-10-CM

## 2023-10-09 DIAGNOSIS — Z00.00 WELL WOMAN EXAM WITHOUT GYNECOLOGICAL EXAM: Primary | ICD-10-CM

## 2023-10-09 PROCEDURE — 99386 PR PREVENTIVE VISIT,NEW,40-64: ICD-10-PCS | Mod: S$GLB,,, | Performed by: FAMILY MEDICINE

## 2023-10-09 PROCEDURE — 1160F RVW MEDS BY RX/DR IN RCRD: CPT | Mod: CPTII,S$GLB,, | Performed by: FAMILY MEDICINE

## 2023-10-09 PROCEDURE — 3078F DIAST BP <80 MM HG: CPT | Mod: CPTII,S$GLB,, | Performed by: FAMILY MEDICINE

## 2023-10-09 PROCEDURE — 3078F PR MOST RECENT DIASTOLIC BLOOD PRESSURE < 80 MM HG: ICD-10-PCS | Mod: CPTII,S$GLB,, | Performed by: FAMILY MEDICINE

## 2023-10-09 PROCEDURE — 99386 PREV VISIT NEW AGE 40-64: CPT | Mod: S$GLB,,, | Performed by: FAMILY MEDICINE

## 2023-10-09 PROCEDURE — 1160F PR REVIEW ALL MEDS BY PRESCRIBER/CLIN PHARMACIST DOCUMENTED: ICD-10-PCS | Mod: CPTII,S$GLB,, | Performed by: FAMILY MEDICINE

## 2023-10-09 PROCEDURE — 3008F PR BODY MASS INDEX (BMI) DOCUMENTED: ICD-10-PCS | Mod: CPTII,S$GLB,, | Performed by: FAMILY MEDICINE

## 2023-10-09 PROCEDURE — 1159F PR MEDICATION LIST DOCUMENTED IN MEDICAL RECORD: ICD-10-PCS | Mod: CPTII,S$GLB,, | Performed by: FAMILY MEDICINE

## 2023-10-09 PROCEDURE — 99999 PR PBB SHADOW E&M-EST. PATIENT-LVL IV: ICD-10-PCS | Mod: PBBFAC,,, | Performed by: FAMILY MEDICINE

## 2023-10-09 PROCEDURE — 3075F SYST BP GE 130 - 139MM HG: CPT | Mod: CPTII,S$GLB,, | Performed by: FAMILY MEDICINE

## 2023-10-09 PROCEDURE — 3075F PR MOST RECENT SYSTOLIC BLOOD PRESS GE 130-139MM HG: ICD-10-PCS | Mod: CPTII,S$GLB,, | Performed by: FAMILY MEDICINE

## 2023-10-09 PROCEDURE — 99999 PR PBB SHADOW E&M-EST. PATIENT-LVL IV: CPT | Mod: PBBFAC,,, | Performed by: FAMILY MEDICINE

## 2023-10-09 PROCEDURE — 1159F MED LIST DOCD IN RCRD: CPT | Mod: CPTII,S$GLB,, | Performed by: FAMILY MEDICINE

## 2023-10-09 PROCEDURE — 3008F BODY MASS INDEX DOCD: CPT | Mod: CPTII,S$GLB,, | Performed by: FAMILY MEDICINE

## 2023-10-09 NOTE — PROGRESS NOTES
"Well Woman VISIT      CHIEF COMPLAINT  Chief Complaint   Patient presents with    Establish Care       HPI  Katelin Leonard is a 38 y.o. female who presents for annual exam and to establish care. Only issue reported is Right Heel swelling/pain that is worse after long day of activity. No trauma to area prior to localized pain/swelling. Wears Gillespie's tennis shoes most of the day and flip-flops sometimes.     Social Factors  Tobacco use: No  Ready to Quit: N/A  Alcohol: No  Intimate partner violence screening  "Do you feel safe in your current relationship?" Yes,  with 3 children (18, 11, and 6 y.o.)   "Have you ever been in a relationship in which your partner frightened you or hurt you?" No  Living Will/POA: No  Regular Exercise: Yes, walks up to 2 miles a day.     Depression  Over the past two weeks, have you felt down, depressed, or hopeless? No  Over the past two weeks, have you felt little interest or pleasure in doing things? No    Reproductive Health  Last menstrual period began 10/2/23 and was normal.   Patient is sexually active.   Contraception: no method  On Daily folic acid:  N/A  STD screening in last year: deferred - no concerns  Last PAP: can't remember, is due.   HIV screening: deferred - no concerns  Currently searching for GYN covered by her insurance    CHD, HTN, DM2  CHD Risk Factors: obesity (BMI >= 30 kg/m2) and sedentary lifestyle  Women 45 years and older should be screened for dyslipidemia if at increased risk of CHD  Women 20 to 45 years of age should be screened for dyslipidemia if at increased risk of CHD  Asymptomatic adults with sustained blood pressure greater than 135/80 mm Hg (treated or untreated) should be screened for type 2 diabetes mellitus    Estimated body mass index is 31.58 kg/m² as calculated from the following:    Height as of this encounter: 5' 8" (1.727 m).    Weight as of this encounter: 94.2 kg (207 lb 10.8 oz).      Screening  Mammogram needed: Yes  Colonoscopy " "needed: No  Osteoporosis screen needed: No     Women 50 to 74 years of age should be screened for breast cancer with mammography biennially.  Women should be screened for cervical cancer with Pap tests beginning at 21 years of age. Low-risk women should receive Pap testing every three years. Co-testing for human papillomavirus is an option beginning at 30 years of age, and can extend the screening interval to five years. Cervical cancer screening should be discontinued at 65 years of age or after total hysterectomy if the woman has a benign gynecologic history  Adults 50 to 75 years of age should be screened for colorectal cancer with an FOBT annually, sigmoidoscopy every five years with an FOBT every three years, or colonoscopy every 10 years.  Women 65 years and older should be screened for osteoporosis. Women younger than 65 years should be screened if the risk of fracture is greater than or equal to that of a 65-year-old white woman without additional risk factors.      Immunizations  up to date and documented    ALLERGIES and MEDS were verified.   PMHx, PSHx, FHx, SOCIALHx were updated as pertinent.    REVIEW OF SYSTEMS  Review of Systems   Constitutional: Negative.    HENT: Negative.     Eyes: Negative.    Respiratory: Negative.     Cardiovascular: Negative.    Gastrointestinal: Negative.    Genitourinary: Negative.    Musculoskeletal:  Positive for joint pain. Negative for back pain, falls, myalgias and neck pain.        Right Heel with localized pain and swelling.    Skin: Negative.    Neurological: Negative.    Endo/Heme/Allergies: Negative.    Psychiatric/Behavioral: Negative.           PHYSICAL EXAM  VITAL SIGNS: /64   Pulse 84   Temp 97.7 °F (36.5 °C) (Oral)   Resp 18   Ht 5' 8" (1.727 m)   Wt 94.2 kg (207 lb 10.8 oz)   LMP 10/03/2023 (Exact Date)   SpO2 99%   Breastfeeding No   BMI 31.58 kg/m²   GEN: Well developed, Well nourished, No acute distress.  HENT: Normocephalic, Atraumatic, " Bilateral external ears normal, Nose normal, Oropharynx moist, No oral exudates.   Eyes: PERRL, EOMI, Conjunctiva normal, No discharge.   Neck: Supple, No tenderness.  Lymphatic: No cervical or supraclavicular lymphadenopathy noted.   Cardiovascular: Normal heart rate, Normal rhythm, No murmurs, No rubs, No gallops.   Thorax & Lungs: Normal breath sounds, No respiratory distress, No wheezing.  Abdomen: Soft, No tenderness, Bowel sounds normal.  Skin: Warm, Dry, No erythema, No rash.   Extremities: Right Achilles with palpable solid immobile nodule - no fluid. Tenderness noted with palpation. No changes noted to Right Heel.       ASSESSMENT/PLAN    Katelin was seen today for establish care.    Diagnoses and all orders for this visit:    Well woman exam without gynecological exam  -     CBC Auto Differential; Future  -     Comprehensive Metabolic Panel; Future  -     Lipid Panel; Future  -     HEMOGLOBIN A1C; Future  -     Urinalysis; Future  - Labs to be done when fasting    Screening mammogram for breast cancer  -     Mammo Digital Screening Bilat w/ Gregory; Future  - Mammogram ordered as she is due    Chronic pain of right ankle  -     X-Ray Ankle Complete Right; Future  - Posterior ankle pain for several weeks without swelling or erythema    Breast cancer screening by mammogram           Preventative Care: CBC, CMP, lipids, HgA1C, Urinalysis - will complete on day that she has fasted. Scheduled mammogram as patient turned 38 this year (no family hx). Patient wants to check with insurance coverage before getting scheduled with OBGYN for cervical screening (no overt concerns).     X-ray of Right Ankle today for palpable/visible nodule with referred pain to Right Heel with ambulation.     Moisés Shi MD

## 2023-10-10 ENCOUNTER — HOSPITAL ENCOUNTER (OUTPATIENT)
Dept: RADIOLOGY | Facility: HOSPITAL | Age: 38
Discharge: HOME | End: 2023-10-10
Attending: FAMILY MEDICINE
Payer: COMMERCIAL

## 2023-10-10 DIAGNOSIS — M25.571 CHRONIC PAIN OF RIGHT ANKLE: ICD-10-CM

## 2023-10-10 DIAGNOSIS — G89.29 CHRONIC PAIN OF RIGHT ANKLE: ICD-10-CM

## 2023-10-10 PROCEDURE — 73610 X-RAY EXAM OF ANKLE: CPT | Mod: TC,FY,PO,RT

## 2023-10-10 PROCEDURE — 73610 XR ANKLE COMPLETE 3 VIEW RIGHT: ICD-10-PCS | Mod: 26,RT,, | Performed by: RADIOLOGY

## 2023-10-10 PROCEDURE — 73610 X-RAY EXAM OF ANKLE: CPT | Mod: 26,RT,, | Performed by: RADIOLOGY

## 2023-10-18 ENCOUNTER — TELEPHONE (OUTPATIENT)
Dept: FAMILY MEDICINE | Facility: CLINIC | Age: 38
End: 2023-10-18
Payer: COMMERCIAL

## 2023-10-18 DIAGNOSIS — D64.9 ACUTE ANEMIA: Primary | ICD-10-CM

## 2023-10-18 NOTE — TELEPHONE ENCOUNTER
----- Message from Moisés Shi MD sent at 10/17/2023  8:25 AM CDT -----  Please call patient and see if she got my message about her being anemic.  It needs to be followed up on  as she was significantly anemic compared to previous tests    Thanks  Dr. Shi

## 2023-10-24 ENCOUNTER — HOSPITAL ENCOUNTER (OUTPATIENT)
Dept: RADIOLOGY | Facility: HOSPITAL | Age: 38
Discharge: HOME | End: 2023-10-24
Attending: FAMILY MEDICINE
Payer: COMMERCIAL

## 2023-10-24 DIAGNOSIS — Z12.31 SCREENING MAMMOGRAM FOR BREAST CANCER: ICD-10-CM

## 2023-10-24 DIAGNOSIS — D64.9 SEVERE ANEMIA: Primary | ICD-10-CM

## 2023-10-24 PROCEDURE — 77067 MAMMO DIGITAL SCREENING BILAT WITH TOMO: ICD-10-PCS | Mod: 26,,, | Performed by: RADIOLOGY

## 2023-10-24 PROCEDURE — 77067 SCR MAMMO BI INCL CAD: CPT | Mod: 26,,, | Performed by: RADIOLOGY

## 2023-10-24 PROCEDURE — 77063 MAMMO DIGITAL SCREENING BILAT WITH TOMO: ICD-10-PCS | Mod: 26,,, | Performed by: RADIOLOGY

## 2023-10-24 PROCEDURE — 77063 BREAST TOMOSYNTHESIS BI: CPT | Mod: 26,,, | Performed by: RADIOLOGY

## 2023-10-24 PROCEDURE — 77067 SCR MAMMO BI INCL CAD: CPT | Mod: TC,PO

## 2023-10-25 DIAGNOSIS — D64.9 ACUTE ANEMIA: Primary | ICD-10-CM

## 2023-11-08 ENCOUNTER — LAB VISIT (OUTPATIENT)
Dept: LAB | Facility: HOSPITAL | Age: 38
End: 2023-11-08
Payer: COMMERCIAL

## 2023-11-08 DIAGNOSIS — D64.9 ACUTE ANEMIA: ICD-10-CM

## 2023-11-08 LAB
ALBUMIN SERPL BCP-MCNC: 4 G/DL (ref 3.5–5.2)
ALP SERPL-CCNC: 52 U/L (ref 55–135)
ALT SERPL W/O P-5'-P-CCNC: 18 U/L (ref 10–44)
ANION GAP SERPL CALC-SCNC: 9 MMOL/L (ref 8–16)
AST SERPL-CCNC: 21 U/L (ref 10–40)
BASOPHILS # BLD AUTO: 0.05 K/UL (ref 0–0.2)
BASOPHILS NFR BLD: 0.6 % (ref 0–1.9)
BILIRUB SERPL-MCNC: 0.4 MG/DL (ref 0.1–1)
BUN SERPL-MCNC: 6 MG/DL (ref 6–20)
CALCIUM SERPL-MCNC: 9.2 MG/DL (ref 8.7–10.5)
CHLORIDE SERPL-SCNC: 107 MMOL/L (ref 95–110)
CO2 SERPL-SCNC: 24 MMOL/L (ref 23–29)
CREAT SERPL-MCNC: 0.8 MG/DL (ref 0.5–1.4)
DIFFERENTIAL METHOD: ABNORMAL
EOSINOPHIL # BLD AUTO: 0.7 K/UL (ref 0–0.5)
EOSINOPHIL NFR BLD: 8.2 % (ref 0–8)
ERYTHROCYTE [DISTWIDTH] IN BLOOD BY AUTOMATED COUNT: 19.2 % (ref 11.5–14.5)
EST. GFR  (NO RACE VARIABLE): >60 ML/MIN/1.73 M^2
FERRITIN SERPL-MCNC: 4 NG/ML (ref 20–300)
GLUCOSE SERPL-MCNC: 89 MG/DL (ref 70–110)
HCT VFR BLD AUTO: 31.5 % (ref 37–48.5)
HGB BLD-MCNC: 8.2 G/DL (ref 12–16)
IMM GRANULOCYTES # BLD AUTO: 0.01 K/UL (ref 0–0.04)
IMM GRANULOCYTES NFR BLD AUTO: 0.1 % (ref 0–0.5)
IRON SERPL-MCNC: 14 UG/DL (ref 30–160)
IRON SERPL-MCNC: 14 UG/DL (ref 30–160)
LYMPHOCYTES # BLD AUTO: 2.8 K/UL (ref 1–4.8)
LYMPHOCYTES NFR BLD: 32.1 % (ref 18–48)
MCH RBC QN AUTO: 17 PG (ref 27–31)
MCHC RBC AUTO-ENTMCNC: 26 G/DL (ref 32–36)
MCV RBC AUTO: 65 FL (ref 82–98)
MONOCYTES # BLD AUTO: 0.7 K/UL (ref 0.3–1)
MONOCYTES NFR BLD: 7.6 % (ref 4–15)
NEUTROPHILS # BLD AUTO: 4.5 K/UL (ref 1.8–7.7)
NEUTROPHILS NFR BLD: 51.4 % (ref 38–73)
NRBC BLD-RTO: 0 /100 WBC
PATH REV BLD -IMP: NORMAL
PATH REV BLD -IMP: NORMAL
PLATELET # BLD AUTO: 460 K/UL (ref 150–450)
PMV BLD AUTO: 8.3 FL (ref 9.2–12.9)
POTASSIUM SERPL-SCNC: 4 MMOL/L (ref 3.5–5.1)
PROT SERPL-MCNC: 8.4 G/DL (ref 6–8.4)
RBC # BLD AUTO: 4.82 M/UL (ref 4–5.4)
SATURATED IRON: 3 % (ref 20–50)
SODIUM SERPL-SCNC: 140 MMOL/L (ref 136–145)
TOTAL IRON BINDING CAPACITY: 490 UG/DL (ref 250–450)
TRANSFERRIN SERPL-MCNC: 331 MG/DL (ref 200–375)
VIT B12 SERPL-MCNC: 626 PG/ML (ref 210–950)
WBC # BLD AUTO: 8.78 K/UL (ref 3.9–12.7)

## 2023-11-08 PROCEDURE — 84466 ASSAY OF TRANSFERRIN: CPT | Performed by: STUDENT IN AN ORGANIZED HEALTH CARE EDUCATION/TRAINING PROGRAM

## 2023-11-08 PROCEDURE — 82607 VITAMIN B-12: CPT | Performed by: STUDENT IN AN ORGANIZED HEALTH CARE EDUCATION/TRAINING PROGRAM

## 2023-11-08 PROCEDURE — 84238 ASSAY NONENDOCRINE RECEPTOR: CPT | Performed by: STUDENT IN AN ORGANIZED HEALTH CARE EDUCATION/TRAINING PROGRAM

## 2023-11-08 PROCEDURE — 83540 ASSAY OF IRON: CPT | Performed by: STUDENT IN AN ORGANIZED HEALTH CARE EDUCATION/TRAINING PROGRAM

## 2023-11-08 PROCEDURE — 85025 COMPLETE CBC W/AUTO DIFF WBC: CPT | Performed by: STUDENT IN AN ORGANIZED HEALTH CARE EDUCATION/TRAINING PROGRAM

## 2023-11-08 PROCEDURE — 80053 COMPREHEN METABOLIC PANEL: CPT | Performed by: STUDENT IN AN ORGANIZED HEALTH CARE EDUCATION/TRAINING PROGRAM

## 2023-11-08 PROCEDURE — 85060 PATHOLOGIST REVIEW: ICD-10-PCS | Mod: ,,, | Performed by: PATHOLOGY

## 2023-11-08 PROCEDURE — 85060 BLOOD SMEAR INTERPRETATION: CPT | Mod: ,,, | Performed by: PATHOLOGY

## 2023-11-08 PROCEDURE — 83921 ORGANIC ACID SINGLE QUANT: CPT | Performed by: STUDENT IN AN ORGANIZED HEALTH CARE EDUCATION/TRAINING PROGRAM

## 2023-11-08 PROCEDURE — 36415 COLL VENOUS BLD VENIPUNCTURE: CPT | Mod: PO | Performed by: STUDENT IN AN ORGANIZED HEALTH CARE EDUCATION/TRAINING PROGRAM

## 2023-11-08 PROCEDURE — 82728 ASSAY OF FERRITIN: CPT | Performed by: STUDENT IN AN ORGANIZED HEALTH CARE EDUCATION/TRAINING PROGRAM

## 2023-11-10 LAB — STFR SERPL-MCNC: 14.9 MG/L (ref 1.8–4.6)

## 2023-11-14 ENCOUNTER — OFFICE VISIT (OUTPATIENT)
Dept: HEMATOLOGY/ONCOLOGY | Facility: CLINIC | Age: 38
End: 2023-11-14
Payer: COMMERCIAL

## 2023-11-14 VITALS
HEIGHT: 68 IN | DIASTOLIC BLOOD PRESSURE: 83 MMHG | SYSTOLIC BLOOD PRESSURE: 139 MMHG | HEART RATE: 92 BPM | BODY MASS INDEX: 31.74 KG/M2 | OXYGEN SATURATION: 100 % | WEIGHT: 209.44 LBS

## 2023-11-14 DIAGNOSIS — D50.0 IRON DEFICIENCY ANEMIA DUE TO CHRONIC BLOOD LOSS: Primary | ICD-10-CM

## 2023-11-14 DIAGNOSIS — Z76.89 ENCOUNTER TO ESTABLISH CARE: ICD-10-CM

## 2023-11-14 PROBLEM — D50.9 IRON DEFICIENCY ANEMIA: Status: ACTIVE | Noted: 2023-11-14

## 2023-11-14 LAB — METHYLMALONATE SERPL-SCNC: 0.11 UMOL/L

## 2023-11-14 PROCEDURE — 99204 PR OFFICE/OUTPT VISIT, NEW, LEVL IV, 45-59 MIN: ICD-10-PCS | Mod: S$GLB,,, | Performed by: STUDENT IN AN ORGANIZED HEALTH CARE EDUCATION/TRAINING PROGRAM

## 2023-11-14 PROCEDURE — 3075F SYST BP GE 130 - 139MM HG: CPT | Mod: CPTII,S$GLB,, | Performed by: STUDENT IN AN ORGANIZED HEALTH CARE EDUCATION/TRAINING PROGRAM

## 2023-11-14 PROCEDURE — 3079F PR MOST RECENT DIASTOLIC BLOOD PRESSURE 80-89 MM HG: ICD-10-PCS | Mod: CPTII,S$GLB,, | Performed by: STUDENT IN AN ORGANIZED HEALTH CARE EDUCATION/TRAINING PROGRAM

## 2023-11-14 PROCEDURE — 3075F PR MOST RECENT SYSTOLIC BLOOD PRESS GE 130-139MM HG: ICD-10-PCS | Mod: CPTII,S$GLB,, | Performed by: STUDENT IN AN ORGANIZED HEALTH CARE EDUCATION/TRAINING PROGRAM

## 2023-11-14 PROCEDURE — 99999 PR PBB SHADOW E&M-EST. PATIENT-LVL III: CPT | Mod: PBBFAC,,, | Performed by: STUDENT IN AN ORGANIZED HEALTH CARE EDUCATION/TRAINING PROGRAM

## 2023-11-14 PROCEDURE — 3008F PR BODY MASS INDEX (BMI) DOCUMENTED: ICD-10-PCS | Mod: CPTII,S$GLB,, | Performed by: STUDENT IN AN ORGANIZED HEALTH CARE EDUCATION/TRAINING PROGRAM

## 2023-11-14 PROCEDURE — 3044F HG A1C LEVEL LT 7.0%: CPT | Mod: CPTII,S$GLB,, | Performed by: STUDENT IN AN ORGANIZED HEALTH CARE EDUCATION/TRAINING PROGRAM

## 2023-11-14 PROCEDURE — 3044F PR MOST RECENT HEMOGLOBIN A1C LEVEL <7.0%: ICD-10-PCS | Mod: CPTII,S$GLB,, | Performed by: STUDENT IN AN ORGANIZED HEALTH CARE EDUCATION/TRAINING PROGRAM

## 2023-11-14 PROCEDURE — 99999 PR PBB SHADOW E&M-EST. PATIENT-LVL III: ICD-10-PCS | Mod: PBBFAC,,, | Performed by: STUDENT IN AN ORGANIZED HEALTH CARE EDUCATION/TRAINING PROGRAM

## 2023-11-14 PROCEDURE — 3008F BODY MASS INDEX DOCD: CPT | Mod: CPTII,S$GLB,, | Performed by: STUDENT IN AN ORGANIZED HEALTH CARE EDUCATION/TRAINING PROGRAM

## 2023-11-14 PROCEDURE — 3079F DIAST BP 80-89 MM HG: CPT | Mod: CPTII,S$GLB,, | Performed by: STUDENT IN AN ORGANIZED HEALTH CARE EDUCATION/TRAINING PROGRAM

## 2023-11-14 PROCEDURE — 99204 OFFICE O/P NEW MOD 45 MIN: CPT | Mod: S$GLB,,, | Performed by: STUDENT IN AN ORGANIZED HEALTH CARE EDUCATION/TRAINING PROGRAM

## 2023-11-14 NOTE — Clinical Note
-Please set up for 5 venofers prior to next MD apt starting in 3 weeks -RTC in 3 months for MD visit and labs CBC, iron, ferritin day prior

## 2023-11-14 NOTE — PROGRESS NOTES
Hematology- Oncology Clinic Note :     2023    Chief Complaint   Patient presents with    iron deficiency anemia    Establish Care         HPI  Pt is a 38 y.o.  female with pmhx of C sections who presents to establish care for iron def anemia.  Pt endorses chronic and heavy cycles (for first 2 days) that are unchanged and mild fatigue.  Labs reviewed with pt and significant for iron def anemia.  1g iron deficit calculated and pt agreeable to IV iron.  All questions answered to her satisfaction and IV iron will be set up prior to next visit.          Active Problem List with Overview Notes    Diagnosis Date Noted    Iron deficiency anemia 2023    H/O:  2017       Patient Active Problem List    Diagnosis Date Noted    Iron deficiency anemia 2023    H/O:  2017     Past Medical History:   Diagnosis Date    Allergy       Past Surgical History:   Procedure Laterality Date     SECTION      x2      (Not in a hospital admission)    Review of patient's allergies indicates:  No Known Allergies   Social History     Tobacco Use    Smoking status: Never     Passive exposure: Never    Smokeless tobacco: Not on file   Substance Use Topics    Alcohol use: No      Family History   Problem Relation Age of Onset    Hypertension Mother     Cancer Brother     Breast cancer Neg Hx     Colon cancer Neg Hx     Ovarian cancer Neg Hx         Review of Systems :  Review of Systems   Constitutional:  Positive for malaise/fatigue. Negative for chills and fever.   HENT:  Negative for congestion, hearing loss and nosebleeds.    Eyes:  Negative for blurred vision and photophobia.   Respiratory:  Negative for cough, hemoptysis, sputum production and shortness of breath.    Cardiovascular:  Negative for chest pain and leg swelling.   Gastrointestinal:  Negative for abdominal pain, blood in stool, constipation, diarrhea, heartburn, melena, nausea and vomiting.   Genitourinary:  Negative for  dysuria and hematuria.   Musculoskeletal:  Negative for myalgias.   Skin:  Negative for itching and rash.   Neurological:  Negative for dizziness and focal weakness.   Endo/Heme/Allergies:  Does not bruise/bleed easily.   Psychiatric/Behavioral:  The patient is not nervous/anxious.        Physical Exam :  Wt Readings from Last 3 Encounters:   11/14/23 95 kg (209 lb 7 oz)   10/09/23 94.2 kg (207 lb 10.8 oz)   04/14/17 93 kg (205 lb)     Temp Readings from Last 3 Encounters:   10/09/23 97.7 °F (36.5 °C) (Oral)   03/05/17 96.1 °F (35.6 °C) (Axillary)     BP Readings from Last 3 Encounters:   11/14/23 139/83   10/09/23 130/64   04/14/17 127/78     Pulse Readings from Last 3 Encounters:   11/14/23 92   10/09/23 84   04/14/17 82     Body mass index is 31.84 kg/m².    Physical Exam  Vitals reviewed.   HENT:      Head: Normocephalic and atraumatic.      Nose: Nose normal.      Mouth/Throat:      Mouth: Mucous membranes are moist.   Eyes:      Pupils: Pupils are equal, round, and reactive to light.   Cardiovascular:      Rate and Rhythm: Normal rate and regular rhythm.      Heart sounds: Normal heart sounds.   Pulmonary:      Effort: Pulmonary effort is normal.      Breath sounds: Normal breath sounds.   Abdominal:      General: Abdomen is flat.   Musculoskeletal:         General: Normal range of motion.      Cervical back: Normal range of motion and neck supple.   Skin:     General: Skin is warm and dry.   Neurological:      Mental Status: She is alert. Mental status is at baseline.   Psychiatric:         Mood and Affect: Mood normal.         Behavior: Behavior normal.           Pertinent Diagnostic studies:    No results found for this or any previous visit (from the past 24 hour(s)).    Assessment/Plan :     ECOG 0      Iron def anemia  -Most likely secondary to chronic heavy cycles  -Denies dark stools or any other signs of bleeding  -Follows with OBGYN  -vitamin labs wnl  -Path rev CBC: Red blood cells- microcytic  hypochromic anemia with   anisopoikilocytosis   -White blood cells- normal in number with  slight relative and   absolute eosinophilia No morphologic abnormalities nor blasts on scanning   -Iron def 1g  -Will set up for 5 venofer IV iron tx prior to next visit  -RTC in 3 months for MD visit and repeat labs CBC, iron and ferritin  -If iron not improving on next set of labs pt will consider colonoscopy    Time spent on case: 45 minutes      Summary of orders placed this encounter:  Orders Placed This Encounter   Procedures    CBC W/ AUTO DIFFERENTIAL    IRON AND TIBC    Ferritin       Future Appointments   Date Time Provider Department Center   2/12/2024 11:30 AM LAB, [facility] [place]   2/14/2024 11:00 AM Kirstie Hernandez MD [facility] HEM ONC [facility]           Kirstie Hernandez MD   Hematology/oncology, [facility] [place]

## 2023-12-04 ENCOUNTER — INFUSION (OUTPATIENT)
Dept: INFUSION THERAPY | Facility: HOSPITAL | Age: 38
End: 2023-12-04
Attending: STUDENT IN AN ORGANIZED HEALTH CARE EDUCATION/TRAINING PROGRAM
Payer: COMMERCIAL

## 2023-12-04 VITALS
TEMPERATURE: 98 F | RESPIRATION RATE: 18 BRPM | DIASTOLIC BLOOD PRESSURE: 80 MMHG | SYSTOLIC BLOOD PRESSURE: 140 MMHG | OXYGEN SATURATION: 100 % | HEART RATE: 91 BPM

## 2023-12-04 DIAGNOSIS — D50.0 IRON DEFICIENCY ANEMIA DUE TO CHRONIC BLOOD LOSS: Primary | ICD-10-CM

## 2023-12-04 PROCEDURE — 63600175 PHARM REV CODE 636 W HCPCS: Performed by: STUDENT IN AN ORGANIZED HEALTH CARE EDUCATION/TRAINING PROGRAM

## 2023-12-04 PROCEDURE — 96374 THER/PROPH/DIAG INJ IV PUSH: CPT

## 2023-12-04 PROCEDURE — 25000003 PHARM REV CODE 250: Performed by: STUDENT IN AN ORGANIZED HEALTH CARE EDUCATION/TRAINING PROGRAM

## 2023-12-04 RX ADMIN — IRON SUCROSE 200 MG: 20 INJECTION, SOLUTION INTRAVENOUS at 09:12

## 2023-12-04 RX ADMIN — SODIUM CHLORIDE: 9 INJECTION, SOLUTION INTRAVENOUS at 09:12

## 2023-12-04 NOTE — PLAN OF CARE
Pt arrived to clinic for scheduled Venofer IVP (1/5). VSS. Infusion tolerated well. Post Infusion 30 min wait time completed, no reactions noted. Voices no concerns at this time. Pt ambulated off unit with son in NAD.

## 2023-12-11 ENCOUNTER — INFUSION (OUTPATIENT)
Dept: INFUSION THERAPY | Facility: HOSPITAL | Age: 38
End: 2023-12-11
Attending: STUDENT IN AN ORGANIZED HEALTH CARE EDUCATION/TRAINING PROGRAM
Payer: COMMERCIAL

## 2023-12-11 VITALS
OXYGEN SATURATION: 99 % | DIASTOLIC BLOOD PRESSURE: 77 MMHG | HEART RATE: 88 BPM | TEMPERATURE: 98 F | SYSTOLIC BLOOD PRESSURE: 144 MMHG | RESPIRATION RATE: 18 BRPM

## 2023-12-11 DIAGNOSIS — D50.0 IRON DEFICIENCY ANEMIA DUE TO CHRONIC BLOOD LOSS: Primary | ICD-10-CM

## 2023-12-11 PROCEDURE — 96374 THER/PROPH/DIAG INJ IV PUSH: CPT

## 2023-12-11 PROCEDURE — 63600175 PHARM REV CODE 636 W HCPCS: Performed by: STUDENT IN AN ORGANIZED HEALTH CARE EDUCATION/TRAINING PROGRAM

## 2023-12-11 RX ADMIN — IRON SUCROSE 200 MG: 20 INJECTION, SOLUTION INTRAVENOUS at 10:12

## 2023-12-11 NOTE — PLAN OF CARE
Pt arrived to clinic for scheduled Venofer IVP 2/5. VSS. Infusion tolerated well. AMbulated from clinic in NAD.

## 2023-12-18 ENCOUNTER — INFUSION (OUTPATIENT)
Dept: INFUSION THERAPY | Facility: HOSPITAL | Age: 38
End: 2023-12-18
Attending: STUDENT IN AN ORGANIZED HEALTH CARE EDUCATION/TRAINING PROGRAM
Payer: COMMERCIAL

## 2023-12-18 VITALS
SYSTOLIC BLOOD PRESSURE: 159 MMHG | HEART RATE: 93 BPM | TEMPERATURE: 98 F | OXYGEN SATURATION: 100 % | RESPIRATION RATE: 18 BRPM | DIASTOLIC BLOOD PRESSURE: 85 MMHG

## 2023-12-18 DIAGNOSIS — D50.0 IRON DEFICIENCY ANEMIA DUE TO CHRONIC BLOOD LOSS: Primary | ICD-10-CM

## 2023-12-18 PROCEDURE — 96374 THER/PROPH/DIAG INJ IV PUSH: CPT

## 2023-12-18 PROCEDURE — 63600175 PHARM REV CODE 636 W HCPCS: Performed by: STUDENT IN AN ORGANIZED HEALTH CARE EDUCATION/TRAINING PROGRAM

## 2023-12-18 RX ADMIN — IRON SUCROSE 200 MG: 20 INJECTION, SOLUTION INTRAVENOUS at 10:12

## 2023-12-18 NOTE — PLAN OF CARE
Patient tolerated venofer IVP dose 3 of 5 well. VSS. Monitored 15-minutes following infusion. No adverse reactions noted during visit. Discharged from unit in NAD.

## 2023-12-26 ENCOUNTER — INFUSION (OUTPATIENT)
Dept: INFUSION THERAPY | Facility: HOSPITAL | Age: 38
End: 2023-12-26
Attending: STUDENT IN AN ORGANIZED HEALTH CARE EDUCATION/TRAINING PROGRAM
Payer: COMMERCIAL

## 2023-12-26 VITALS
HEART RATE: 88 BPM | RESPIRATION RATE: 18 BRPM | SYSTOLIC BLOOD PRESSURE: 165 MMHG | TEMPERATURE: 99 F | OXYGEN SATURATION: 98 % | DIASTOLIC BLOOD PRESSURE: 76 MMHG

## 2023-12-26 DIAGNOSIS — D50.0 IRON DEFICIENCY ANEMIA DUE TO CHRONIC BLOOD LOSS: Primary | ICD-10-CM

## 2023-12-26 PROCEDURE — 63600175 PHARM REV CODE 636 W HCPCS: Performed by: STUDENT IN AN ORGANIZED HEALTH CARE EDUCATION/TRAINING PROGRAM

## 2023-12-26 PROCEDURE — 96374 THER/PROPH/DIAG INJ IV PUSH: CPT

## 2023-12-26 RX ADMIN — IRON SUCROSE 200 MG: 20 INJECTION, SOLUTION INTRAVENOUS at 09:12

## 2023-12-26 NOTE — PLAN OF CARE
Patient received venofer IVP dose # 4 of 5, weekly. Tolerated IV iron well. VSS. No adverse reactions noted during visit. Discharged from unit in NAD.

## 2024-01-02 ENCOUNTER — INFUSION (OUTPATIENT)
Dept: INFUSION THERAPY | Facility: HOSPITAL | Age: 39
End: 2024-01-02
Attending: STUDENT IN AN ORGANIZED HEALTH CARE EDUCATION/TRAINING PROGRAM
Payer: COMMERCIAL

## 2024-01-02 VITALS
RESPIRATION RATE: 18 BRPM | TEMPERATURE: 99 F | OXYGEN SATURATION: 100 % | DIASTOLIC BLOOD PRESSURE: 80 MMHG | SYSTOLIC BLOOD PRESSURE: 139 MMHG | HEART RATE: 90 BPM

## 2024-01-02 DIAGNOSIS — D50.0 IRON DEFICIENCY ANEMIA DUE TO CHRONIC BLOOD LOSS: Primary | ICD-10-CM

## 2024-01-02 PROCEDURE — 96374 THER/PROPH/DIAG INJ IV PUSH: CPT

## 2024-01-02 PROCEDURE — 63600175 PHARM REV CODE 636 W HCPCS: Performed by: STUDENT IN AN ORGANIZED HEALTH CARE EDUCATION/TRAINING PROGRAM

## 2024-01-02 RX ADMIN — IRON SUCROSE 200 MG: 20 INJECTION, SOLUTION INTRAVENOUS at 10:01

## 2024-01-02 NOTE — PLAN OF CARE
Patient presented to unit for Venofer IVP (5/5). VSS. No complaints voiced. Venofer tolerated well. Discharged in NAD.    Problem: Anemia  Goal: Anemia Symptom Improvement  Outcome: Met

## 2024-02-12 ENCOUNTER — LAB VISIT (OUTPATIENT)
Dept: LAB | Facility: HOSPITAL | Age: 39
End: 2024-02-12
Payer: COMMERCIAL

## 2024-02-12 DIAGNOSIS — D50.0 IRON DEFICIENCY ANEMIA DUE TO CHRONIC BLOOD LOSS: ICD-10-CM

## 2024-02-12 LAB
BASOPHILS # BLD AUTO: 0.03 K/UL (ref 0–0.2)
BASOPHILS NFR BLD: 0.4 % (ref 0–1.9)
DIFFERENTIAL METHOD BLD: ABNORMAL
EOSINOPHIL # BLD AUTO: 0.4 K/UL (ref 0–0.5)
EOSINOPHIL NFR BLD: 4.2 % (ref 0–8)
ERYTHROCYTE [DISTWIDTH] IN BLOOD BY AUTOMATED COUNT: 22.5 % (ref 11.5–14.5)
FERRITIN SERPL-MCNC: 9 NG/ML (ref 20–300)
HCT VFR BLD AUTO: 39.8 % (ref 37–48.5)
HGB BLD-MCNC: 12 G/DL (ref 12–16)
IMM GRANULOCYTES # BLD AUTO: 0.01 K/UL (ref 0–0.04)
IMM GRANULOCYTES NFR BLD AUTO: 0.1 % (ref 0–0.5)
IRON SERPL-MCNC: 21 UG/DL (ref 30–160)
LYMPHOCYTES # BLD AUTO: 3 K/UL (ref 1–4.8)
LYMPHOCYTES NFR BLD: 34.9 % (ref 18–48)
MCH RBC QN AUTO: 23.3 PG (ref 27–31)
MCHC RBC AUTO-ENTMCNC: 30.2 G/DL (ref 32–36)
MCV RBC AUTO: 77 FL (ref 82–98)
MONOCYTES # BLD AUTO: 0.6 K/UL (ref 0.3–1)
MONOCYTES NFR BLD: 6.5 % (ref 4–15)
NEUTROPHILS # BLD AUTO: 4.6 K/UL (ref 1.8–7.7)
NEUTROPHILS NFR BLD: 53.9 % (ref 38–73)
NRBC BLD-RTO: 0 /100 WBC
PLATELET # BLD AUTO: 357 K/UL (ref 150–450)
PMV BLD AUTO: 8.8 FL (ref 9.2–12.9)
RBC # BLD AUTO: 5.15 M/UL (ref 4–5.4)
SATURATED IRON: 5 % (ref 20–50)
TOTAL IRON BINDING CAPACITY: 398 UG/DL (ref 250–450)
TRANSFERRIN SERPL-MCNC: 269 MG/DL (ref 200–375)
WBC # BLD AUTO: 8.49 K/UL (ref 3.9–12.7)

## 2024-02-12 PROCEDURE — 82728 ASSAY OF FERRITIN: CPT | Performed by: STUDENT IN AN ORGANIZED HEALTH CARE EDUCATION/TRAINING PROGRAM

## 2024-02-12 PROCEDURE — 83540 ASSAY OF IRON: CPT | Performed by: STUDENT IN AN ORGANIZED HEALTH CARE EDUCATION/TRAINING PROGRAM

## 2024-02-12 PROCEDURE — 85025 COMPLETE CBC W/AUTO DIFF WBC: CPT | Performed by: STUDENT IN AN ORGANIZED HEALTH CARE EDUCATION/TRAINING PROGRAM

## 2024-02-12 PROCEDURE — 36415 COLL VENOUS BLD VENIPUNCTURE: CPT | Mod: PO | Performed by: STUDENT IN AN ORGANIZED HEALTH CARE EDUCATION/TRAINING PROGRAM

## 2024-02-14 ENCOUNTER — OFFICE VISIT (OUTPATIENT)
Dept: HEMATOLOGY/ONCOLOGY | Facility: CLINIC | Age: 39
End: 2024-02-14
Payer: COMMERCIAL

## 2024-02-14 VITALS
HEART RATE: 79 BPM | BODY MASS INDEX: 32.21 KG/M2 | DIASTOLIC BLOOD PRESSURE: 88 MMHG | WEIGHT: 212.5 LBS | OXYGEN SATURATION: 100 % | HEIGHT: 68 IN | SYSTOLIC BLOOD PRESSURE: 146 MMHG

## 2024-02-14 DIAGNOSIS — Z71.2 ENCOUNTER TO DISCUSS TEST RESULTS: ICD-10-CM

## 2024-02-14 DIAGNOSIS — D50.0 IRON DEFICIENCY ANEMIA DUE TO CHRONIC BLOOD LOSS: Primary | ICD-10-CM

## 2024-02-14 DIAGNOSIS — Z09 FOLLOW-UP EXAM: ICD-10-CM

## 2024-02-14 DIAGNOSIS — R53.83 FATIGUE, UNSPECIFIED TYPE: ICD-10-CM

## 2024-02-14 PROCEDURE — 3008F BODY MASS INDEX DOCD: CPT | Mod: CPTII,S$GLB,, | Performed by: STUDENT IN AN ORGANIZED HEALTH CARE EDUCATION/TRAINING PROGRAM

## 2024-02-14 PROCEDURE — 99999 PR PBB SHADOW E&M-EST. PATIENT-LVL III: CPT | Mod: PBBFAC,,, | Performed by: STUDENT IN AN ORGANIZED HEALTH CARE EDUCATION/TRAINING PROGRAM

## 2024-02-14 PROCEDURE — 99214 OFFICE O/P EST MOD 30 MIN: CPT | Mod: S$GLB,,, | Performed by: STUDENT IN AN ORGANIZED HEALTH CARE EDUCATION/TRAINING PROGRAM

## 2024-02-14 PROCEDURE — 3077F SYST BP >= 140 MM HG: CPT | Mod: CPTII,S$GLB,, | Performed by: STUDENT IN AN ORGANIZED HEALTH CARE EDUCATION/TRAINING PROGRAM

## 2024-02-14 PROCEDURE — 1159F MED LIST DOCD IN RCRD: CPT | Mod: CPTII,S$GLB,, | Performed by: STUDENT IN AN ORGANIZED HEALTH CARE EDUCATION/TRAINING PROGRAM

## 2024-02-14 PROCEDURE — 3079F DIAST BP 80-89 MM HG: CPT | Mod: CPTII,S$GLB,, | Performed by: STUDENT IN AN ORGANIZED HEALTH CARE EDUCATION/TRAINING PROGRAM

## 2024-02-14 NOTE — PROGRESS NOTES
Hematology- Oncology Clinic Note :     2024    Chief Complaint   Patient presents with    Follow-up    Anemia    Results         HPI  Pt is a 38 y.o.  female with pmhx of C sections who presents for follow up of iron def anemia.  Pt endorses chronic and heavy cycles (for first 2 days) that are unchanged and mild fatigue has improved.  Labs reviewed with pt and hb now wnl but ferritin still low.  Pt agreeable to additional IV iron as her cycles are still heavy.           Active Problem List with Overview Notes    Diagnosis Date Noted    Iron deficiency anemia 2023    H/O:  2017       Patient Active Problem List    Diagnosis Date Noted    Iron deficiency anemia 2023    H/O:  2017     Past Medical History:   Diagnosis Date    Allergy       Past Surgical History:   Procedure Laterality Date     SECTION      x2      (Not in a hospital admission)    Review of patient's allergies indicates:  No Known Allergies   Social History     Tobacco Use    Smoking status: Never     Passive exposure: Never    Smokeless tobacco: Not on file   Substance Use Topics    Alcohol use: No      Family History   Problem Relation Age of Onset    Hypertension Mother     Cancer Brother     Breast cancer Neg Hx     Colon cancer Neg Hx     Ovarian cancer Neg Hx         Review of Systems :  Review of Systems   Constitutional:  Positive for malaise/fatigue. Negative for chills and fever.   HENT:  Negative for congestion, hearing loss and nosebleeds.    Eyes:  Negative for blurred vision and photophobia.   Respiratory:  Negative for cough, hemoptysis, sputum production and shortness of breath.    Cardiovascular:  Negative for chest pain and leg swelling.   Gastrointestinal:  Negative for abdominal pain, blood in stool, constipation, diarrhea, heartburn, melena, nausea and vomiting.   Genitourinary:  Negative for dysuria and hematuria.   Musculoskeletal:  Negative for myalgias.   Skin:  Negative  for itching and rash.   Neurological:  Negative for dizziness and focal weakness.   Endo/Heme/Allergies:  Does not bruise/bleed easily.   Psychiatric/Behavioral:  The patient is not nervous/anxious.        Physical Exam :  Wt Readings from Last 3 Encounters:   02/14/24 96.4 kg (212 lb 8.4 oz)   11/14/23 95 kg (209 lb 7 oz)   10/09/23 94.2 kg (207 lb 10.8 oz)     Temp Readings from Last 3 Encounters:   01/02/24 98.8 °F (37.1 °C)   12/26/23 98.8 °F (37.1 °C)   12/18/23 98.2 °F (36.8 °C)     BP Readings from Last 3 Encounters:   02/14/24 (!) 146/88   01/02/24 139/80   12/26/23 (!) 165/76     Pulse Readings from Last 3 Encounters:   02/14/24 79   01/02/24 90   12/26/23 88     Body mass index is 32.31 kg/m².    Physical Exam  Vitals reviewed.   HENT:      Head: Normocephalic and atraumatic.      Nose: Nose normal.      Mouth/Throat:      Mouth: Mucous membranes are moist.   Eyes:      Pupils: Pupils are equal, round, and reactive to light.   Cardiovascular:      Rate and Rhythm: Normal rate and regular rhythm.      Heart sounds: Normal heart sounds.   Pulmonary:      Effort: Pulmonary effort is normal.      Breath sounds: Normal breath sounds.   Abdominal:      General: Abdomen is flat.   Musculoskeletal:         General: Normal range of motion.      Cervical back: Normal range of motion and neck supple.   Skin:     General: Skin is warm and dry.   Neurological:      Mental Status: She is alert. Mental status is at baseline.   Psychiatric:         Mood and Affect: Mood normal.         Behavior: Behavior normal.           Pertinent Diagnostic studies:    No results found for this or any previous visit (from the past 24 hour(s)).    Assessment/Plan :     ECOG 0      Iron def anemia  -Most likely secondary to chronic heavy cycles  -Denies dark stools or any other signs of bleeding  -Follows with OBGYN  -vitamin labs wnl  -Path rev CBC: Red blood cells- microcytic hypochromic anemia with   anisopoikilocytosis   -White blood  cells- normal in number with  slight relative and   absolute eosinophilia No morphologic abnormalities nor blasts on scanning   -Iron levels improving post IV iron and hb now wnl  -Ferritin still low so will give additional 3 IV iron  -RTC in 4 months for MD virtual visit and repeat labs CBC, iron and ferritin          Time spent on case: 30 minutes      Summary of orders placed this encounter:  Orders Placed This Encounter   Procedures    CBC W/ AUTO DIFFERENTIAL    IRON AND TIBC    Ferritin       Future Appointments   Date Time Provider Department Center   6/13/2024 10:00 AM [facility] [place]   6/14/2024 11:00 AM Kirstie Hernandez MD [facility] HEM ONC [facility]           Kirstie Hernandez MD   Hematology/oncology, [facility] [place]

## 2024-02-14 NOTE — Clinical Note
-Please give 3 IV iron tx in next 2-4 weeks -RTC in 4 months for MD virtual visit with labs CBC, iron and ferritin day prior

## 2024-03-05 ENCOUNTER — INFUSION (OUTPATIENT)
Dept: INFUSION THERAPY | Facility: HOSPITAL | Age: 39
End: 2024-03-05
Attending: STUDENT IN AN ORGANIZED HEALTH CARE EDUCATION/TRAINING PROGRAM
Payer: COMMERCIAL

## 2024-03-05 VITALS
OXYGEN SATURATION: 96 % | TEMPERATURE: 98 F | DIASTOLIC BLOOD PRESSURE: 83 MMHG | SYSTOLIC BLOOD PRESSURE: 143 MMHG | HEART RATE: 90 BPM | RESPIRATION RATE: 16 BRPM

## 2024-03-05 DIAGNOSIS — D50.0 IRON DEFICIENCY ANEMIA DUE TO CHRONIC BLOOD LOSS: Primary | ICD-10-CM

## 2024-03-05 PROCEDURE — 96374 THER/PROPH/DIAG INJ IV PUSH: CPT

## 2024-03-05 PROCEDURE — 63600175 PHARM REV CODE 636 W HCPCS: Performed by: STUDENT IN AN ORGANIZED HEALTH CARE EDUCATION/TRAINING PROGRAM

## 2024-03-05 RX ADMIN — IRON SUCROSE 200 MG: 20 INJECTION, SOLUTION INTRAVENOUS at 08:03

## 2024-03-05 NOTE — PLAN OF CARE
Patient presented to unit for Venofer IVP (1/3). VSS. No complaints voiced. Venofer tolerated well. Discharged from unit ambulatory and in NAD.    Problem: Anemia  Goal: Anemia Symptom Improvement  Outcome: Ongoing, Progressing

## 2024-03-12 ENCOUNTER — INFUSION (OUTPATIENT)
Dept: INFUSION THERAPY | Facility: HOSPITAL | Age: 39
End: 2024-03-12
Attending: STUDENT IN AN ORGANIZED HEALTH CARE EDUCATION/TRAINING PROGRAM
Payer: COMMERCIAL

## 2024-03-12 VITALS
SYSTOLIC BLOOD PRESSURE: 159 MMHG | TEMPERATURE: 98 F | HEART RATE: 91 BPM | DIASTOLIC BLOOD PRESSURE: 89 MMHG | RESPIRATION RATE: 16 BRPM | OXYGEN SATURATION: 100 %

## 2024-03-12 DIAGNOSIS — D50.0 IRON DEFICIENCY ANEMIA DUE TO CHRONIC BLOOD LOSS: Primary | ICD-10-CM

## 2024-03-12 PROCEDURE — 96374 THER/PROPH/DIAG INJ IV PUSH: CPT

## 2024-03-12 PROCEDURE — 63600175 PHARM REV CODE 636 W HCPCS: Performed by: STUDENT IN AN ORGANIZED HEALTH CARE EDUCATION/TRAINING PROGRAM

## 2024-03-12 RX ADMIN — IRON SUCROSE 200 MG: 20 INJECTION, SOLUTION INTRAVENOUS at 08:03

## 2024-03-12 NOTE — PLAN OF CARE
Patient presented to unit for Venofer IVP (2/3). VSS. No complaints voiced. Venofer tolerated well. Discharged from unit ambulatory and in NAD.    Problem: Anemia  Goal: Anemia Symptom Improvement  Outcome: Ongoing, Progressing

## 2024-03-19 ENCOUNTER — INFUSION (OUTPATIENT)
Dept: INFUSION THERAPY | Facility: HOSPITAL | Age: 39
End: 2024-03-19
Attending: STUDENT IN AN ORGANIZED HEALTH CARE EDUCATION/TRAINING PROGRAM
Payer: COMMERCIAL

## 2024-03-19 VITALS
HEART RATE: 96 BPM | TEMPERATURE: 97 F | OXYGEN SATURATION: 99 % | DIASTOLIC BLOOD PRESSURE: 81 MMHG | RESPIRATION RATE: 16 BRPM | SYSTOLIC BLOOD PRESSURE: 163 MMHG

## 2024-03-19 DIAGNOSIS — D50.0 IRON DEFICIENCY ANEMIA DUE TO CHRONIC BLOOD LOSS: Primary | ICD-10-CM

## 2024-03-19 PROCEDURE — 96374 THER/PROPH/DIAG INJ IV PUSH: CPT

## 2024-03-19 PROCEDURE — 63600175 PHARM REV CODE 636 W HCPCS: Performed by: STUDENT IN AN ORGANIZED HEALTH CARE EDUCATION/TRAINING PROGRAM

## 2024-03-19 RX ADMIN — IRON SUCROSE 200 MG: 20 INJECTION, SOLUTION INTRAVENOUS at 08:03

## 2024-03-19 NOTE — PLAN OF CARE
Patient arrived to unit for #3 of 3 Venofer IVP infusion. Plan of care reviewed, patient agreeable to plan. Patient tolerated infusion well.VSS.Pt has future follow up appt for labs and Dr. Hernandez appt in June. Patient ambulated off unit unassisted by self. Patient in NAD at time of discharge.

## 2024-06-13 ENCOUNTER — LAB VISIT (OUTPATIENT)
Dept: LAB | Facility: HOSPITAL | Age: 39
End: 2024-06-13
Payer: COMMERCIAL

## 2024-06-13 DIAGNOSIS — D50.0 IRON DEFICIENCY ANEMIA DUE TO CHRONIC BLOOD LOSS: ICD-10-CM

## 2024-06-13 LAB
BASOPHILS # BLD AUTO: 0.04 K/UL (ref 0–0.2)
BASOPHILS NFR BLD: 0.4 % (ref 0–1.9)
DIFFERENTIAL METHOD BLD: ABNORMAL
EOSINOPHIL # BLD AUTO: 0.2 K/UL (ref 0–0.5)
EOSINOPHIL NFR BLD: 1.9 % (ref 0–8)
ERYTHROCYTE [DISTWIDTH] IN BLOOD BY AUTOMATED COUNT: 13.8 % (ref 11.5–14.5)
FERRITIN SERPL-MCNC: 20 NG/ML (ref 20–300)
HCT VFR BLD AUTO: 42.2 % (ref 37–48.5)
HGB BLD-MCNC: 13.8 G/DL (ref 12–16)
IMM GRANULOCYTES # BLD AUTO: 0.03 K/UL (ref 0–0.04)
IMM GRANULOCYTES NFR BLD AUTO: 0.3 % (ref 0–0.5)
IRON SERPL-MCNC: 49 UG/DL (ref 30–160)
LYMPHOCYTES # BLD AUTO: 2.3 K/UL (ref 1–4.8)
LYMPHOCYTES NFR BLD: 21.8 % (ref 18–48)
MCH RBC QN AUTO: 26.8 PG (ref 27–31)
MCHC RBC AUTO-ENTMCNC: 32.7 G/DL (ref 32–36)
MCV RBC AUTO: 82 FL (ref 82–98)
MONOCYTES # BLD AUTO: 0.4 K/UL (ref 0.3–1)
MONOCYTES NFR BLD: 4.1 % (ref 4–15)
NEUTROPHILS # BLD AUTO: 7.6 K/UL (ref 1.8–7.7)
NEUTROPHILS NFR BLD: 71.5 % (ref 38–73)
NRBC BLD-RTO: 0 /100 WBC
PLATELET # BLD AUTO: 372 K/UL (ref 150–450)
PMV BLD AUTO: 8.6 FL (ref 9.2–12.9)
RBC # BLD AUTO: 5.15 M/UL (ref 4–5.4)
SATURATED IRON: 14 % (ref 20–50)
TOTAL IRON BINDING CAPACITY: 351 UG/DL (ref 250–450)
TRANSFERRIN SERPL-MCNC: 237 MG/DL (ref 200–375)
WBC # BLD AUTO: 10.61 K/UL (ref 3.9–12.7)

## 2024-06-13 PROCEDURE — 82728 ASSAY OF FERRITIN: CPT | Performed by: STUDENT IN AN ORGANIZED HEALTH CARE EDUCATION/TRAINING PROGRAM

## 2024-06-13 PROCEDURE — 85025 COMPLETE CBC W/AUTO DIFF WBC: CPT | Performed by: STUDENT IN AN ORGANIZED HEALTH CARE EDUCATION/TRAINING PROGRAM

## 2024-06-13 PROCEDURE — 36415 COLL VENOUS BLD VENIPUNCTURE: CPT | Mod: PO | Performed by: STUDENT IN AN ORGANIZED HEALTH CARE EDUCATION/TRAINING PROGRAM

## 2024-06-13 PROCEDURE — 83540 ASSAY OF IRON: CPT | Performed by: STUDENT IN AN ORGANIZED HEALTH CARE EDUCATION/TRAINING PROGRAM

## 2024-06-14 ENCOUNTER — OFFICE VISIT (OUTPATIENT)
Dept: HEMATOLOGY/ONCOLOGY | Facility: CLINIC | Age: 39
End: 2024-06-14
Payer: COMMERCIAL

## 2024-06-14 DIAGNOSIS — Z71.2 ENCOUNTER TO DISCUSS TEST RESULTS: ICD-10-CM

## 2024-06-14 DIAGNOSIS — D50.0 IRON DEFICIENCY ANEMIA DUE TO CHRONIC BLOOD LOSS: Primary | ICD-10-CM

## 2024-06-14 PROCEDURE — 99213 OFFICE O/P EST LOW 20 MIN: CPT | Mod: 95,,, | Performed by: STUDENT IN AN ORGANIZED HEALTH CARE EDUCATION/TRAINING PROGRAM

## 2024-06-14 NOTE — PROGRESS NOTES
Hematology- Oncology Clinic Note :     2024    Chief Complaint   Patient presents with    Anemia    Follow-up    Results     The patient location is: home  The chief complaint leading to consultation is: iron def anemia  Visit type: Virtual visit with synchronous audio and video  Total time spent with patient: 15 minutes  Each patient to whom he or she provides medical services by telemedicine is:  (1) informed of the relationship between the physician and patient and the respective role of any other health care provider with respect to management of the patient; and (2) notified that he or she may decline to receive medical services by telemedicine and may withdraw from such care at any time.        HPI  Pt is a 38 y.o.  female with pmhx of C sections who presents for follow up of iron def anemia.  Pt endorses chronic and heavy cycles (for first 2 days) that are unchanged and mild fatigue has improved and she is feeling overall better.  Labs reviewed with pt and hb now wnl but ferritin still low.  Pt agreeable to additional IV iron as her cycles are still heavy.   No other issues at time of exam.         Active Problem List with Overview Notes    Diagnosis Date Noted    Iron deficiency anemia 2023    H/O:  2017       Patient Active Problem List    Diagnosis Date Noted    Iron deficiency anemia 2023    H/O:  2017     Past Medical History:   Diagnosis Date    Allergy       Past Surgical History:   Procedure Laterality Date     SECTION      x2      (Not in a hospital admission)    Review of patient's allergies indicates:  No Known Allergies   Social History     Tobacco Use    Smoking status: Never     Passive exposure: Never    Smokeless tobacco: Not on file   Substance Use Topics    Alcohol use: No      Family History   Problem Relation Name Age of Onset    Hypertension Mother      Cancer Brother      Breast cancer Neg Hx      Colon cancer Neg Hx      Ovarian  cancer Neg Hx          Review of Systems :  Review of Systems   Constitutional:  Positive for malaise/fatigue. Negative for chills and fever.   HENT:  Negative for congestion, hearing loss and nosebleeds.    Eyes:  Negative for blurred vision and photophobia.   Respiratory:  Negative for cough, hemoptysis, sputum production and shortness of breath.    Cardiovascular:  Negative for chest pain and leg swelling.   Gastrointestinal:  Negative for abdominal pain, blood in stool, constipation, diarrhea, heartburn, melena, nausea and vomiting.   Genitourinary:  Negative for dysuria and hematuria.   Musculoskeletal:  Negative for myalgias.   Skin:  Negative for itching and rash.   Neurological:  Negative for dizziness and focal weakness.   Endo/Heme/Allergies:  Does not bruise/bleed easily.   Psychiatric/Behavioral:  The patient is not nervous/anxious.        Physical Exam :  Wt Readings from Last 3 Encounters:   02/14/24 96.4 kg (212 lb 8.4 oz)   11/14/23 95 kg (209 lb 7 oz)   10/09/23 94.2 kg (207 lb 10.8 oz)     Temp Readings from Last 3 Encounters:   03/19/24 97.3 °F (36.3 °C) (Oral)   03/12/24 98.2 °F (36.8 °C) (Oral)   03/05/24 98.2 °F (36.8 °C) (Oral)     BP Readings from Last 3 Encounters:   03/19/24 (!) 163/81   03/12/24 (!) 159/89   03/05/24 (!) 143/83     Pulse Readings from Last 3 Encounters:   03/19/24 96   03/12/24 91   03/05/24 90     There is no height or weight on file to calculate BMI.    Physical Exam  Vitals reviewed.   HENT:      Head: Normocephalic and atraumatic.      Nose: Nose normal.      Mouth/Throat:      Mouth: Mucous membranes are moist.   Eyes:      Pupils: Pupils are equal, round, and reactive to light.   Cardiovascular:      Rate and Rhythm: Normal rate and regular rhythm.      Heart sounds: Normal heart sounds.   Pulmonary:      Effort: Pulmonary effort is normal.      Breath sounds: Normal breath sounds.   Abdominal:      General: Abdomen is flat.   Musculoskeletal:         General: Normal  range of motion.      Cervical back: Normal range of motion and neck supple.   Skin:     General: Skin is warm and dry.   Neurological:      Mental Status: She is alert. Mental status is at baseline.   Psychiatric:         Mood and Affect: Mood normal.         Behavior: Behavior normal.           Pertinent Diagnostic studies:    No results found for this or any previous visit (from the past 24 hour(s)).    Assessment/Plan :     ECOG 0      Iron def anemia  -Most likely secondary to chronic heavy cycles  -Denies dark stools or any other signs of bleeding  -Follows with OBGYN  -vitamin labs wnl  -Path rev CBC: Red blood cells- microcytic hypochromic anemia with   anisopoikilocytosis   -White blood cells- normal in number with  slight relative and   absolute eosinophilia No morphologic abnormalities nor blasts on scanning   -Iron levels improving post IV iron and hb now wnl  -Ferritin low end of normal, will give 1 dose of IV iron  -RTC in 3 months for CBC, iron, ferritin only and in 6 months for MD virtual visit and repeat labs CBC, iron and ferritin day prior          Time spent on case: 20 minutes      Summary of orders placed this encounter:  No orders of the defined types were placed in this encounter.      Future Appointments   Date Time Provider Department Center   6/14/2024 11:00 AM Kirstie Hernandez MD [facility] HEM ONC [facility] Cli           Kirstie Hernandez MD   Hematology/oncology, [facility] - [place]

## 2024-06-14 NOTE — Clinical Note
-Give 1 dose of venofer next month -RTC in 3 months for CBC, iron, ferritin only and in 6 months for MD virtual visit and repeat labs CBC, iron and ferritin day prior

## 2024-07-24 ENCOUNTER — PATIENT MESSAGE (OUTPATIENT)
Dept: HEMATOLOGY/ONCOLOGY | Facility: CLINIC | Age: 39
End: 2024-07-24
Payer: COMMERCIAL

## 2024-08-08 ENCOUNTER — PATIENT MESSAGE (OUTPATIENT)
Dept: HEMATOLOGY/ONCOLOGY | Facility: CLINIC | Age: 39
End: 2024-08-08
Payer: COMMERCIAL

## 2024-09-18 ENCOUNTER — INFUSION (OUTPATIENT)
Dept: INFUSION THERAPY | Facility: HOSPITAL | Age: 39
End: 2024-09-18
Attending: STUDENT IN AN ORGANIZED HEALTH CARE EDUCATION/TRAINING PROGRAM
Payer: COMMERCIAL

## 2024-09-18 VITALS
OXYGEN SATURATION: 99 % | DIASTOLIC BLOOD PRESSURE: 86 MMHG | HEART RATE: 86 BPM | SYSTOLIC BLOOD PRESSURE: 155 MMHG | TEMPERATURE: 98 F | RESPIRATION RATE: 18 BRPM

## 2024-09-18 DIAGNOSIS — D50.0 IRON DEFICIENCY ANEMIA DUE TO CHRONIC BLOOD LOSS: Primary | ICD-10-CM

## 2024-09-18 PROCEDURE — 96374 THER/PROPH/DIAG INJ IV PUSH: CPT

## 2024-09-18 PROCEDURE — 63600175 PHARM REV CODE 636 W HCPCS: Performed by: STUDENT IN AN ORGANIZED HEALTH CARE EDUCATION/TRAINING PROGRAM

## 2024-09-18 RX ADMIN — IRON SUCROSE 200 MG: 20 INJECTION, SOLUTION INTRAVENOUS at 09:09

## 2024-09-18 NOTE — PLAN OF CARE
Pt arrived to the Infusion unit due to IV medication treatment. Pt reports no symptoms or concerns at this time. Pt received Venofer IVP over 5 minutes with NS 250mL flush bag. Pt denies any adverse reactions. Pt verbalizes no additional needs at this time. Pt ambulates independently with no acute distress. Pt aware of next scheduled appointment.

## 2024-11-07 ENCOUNTER — HOSPITAL ENCOUNTER (OUTPATIENT)
Dept: RADIOLOGY | Facility: HOSPITAL | Age: 39
Discharge: HOME | End: 2024-11-07
Attending: FAMILY MEDICINE
Payer: COMMERCIAL

## 2024-11-07 ENCOUNTER — OFFICE VISIT (OUTPATIENT)
Dept: FAMILY MEDICINE | Facility: CLINIC | Age: 39
End: 2024-11-07
Payer: COMMERCIAL

## 2024-11-07 VITALS
OXYGEN SATURATION: 97 % | WEIGHT: 217.25 LBS | TEMPERATURE: 98 F | BODY MASS INDEX: 32.92 KG/M2 | HEART RATE: 78 BPM | HEIGHT: 68 IN

## 2024-11-07 DIAGNOSIS — D50.0 IRON DEFICIENCY ANEMIA DUE TO CHRONIC BLOOD LOSS: ICD-10-CM

## 2024-11-07 DIAGNOSIS — Z12.31 ENCOUNTER FOR SCREENING MAMMOGRAM FOR BREAST CANCER: ICD-10-CM

## 2024-11-07 DIAGNOSIS — G89.29 CHRONIC RIGHT HIP PAIN: ICD-10-CM

## 2024-11-07 DIAGNOSIS — M25.551 CHRONIC RIGHT HIP PAIN: ICD-10-CM

## 2024-11-07 DIAGNOSIS — Z00.00 WELL WOMAN EXAM WITHOUT GYNECOLOGICAL EXAM: Primary | ICD-10-CM

## 2024-11-07 PROCEDURE — 73502 X-RAY EXAM HIP UNI 2-3 VIEWS: CPT | Mod: 26,RT,, | Performed by: RADIOLOGY

## 2024-11-07 PROCEDURE — 77063 BREAST TOMOSYNTHESIS BI: CPT | Mod: TC,PO

## 2024-11-07 PROCEDURE — 77067 SCR MAMMO BI INCL CAD: CPT | Mod: 26,,, | Performed by: RADIOLOGY

## 2024-11-07 PROCEDURE — 99396 PREV VISIT EST AGE 40-64: CPT | Mod: S$GLB,,, | Performed by: FAMILY MEDICINE

## 2024-11-07 PROCEDURE — 73502 X-RAY EXAM HIP UNI 2-3 VIEWS: CPT | Mod: TC,FY,PO,RT

## 2024-11-07 PROCEDURE — 1160F RVW MEDS BY RX/DR IN RCRD: CPT | Mod: CPTII,S$GLB,, | Performed by: FAMILY MEDICINE

## 2024-11-07 PROCEDURE — 77063 BREAST TOMOSYNTHESIS BI: CPT | Mod: 26,,, | Performed by: RADIOLOGY

## 2024-11-07 PROCEDURE — 3008F BODY MASS INDEX DOCD: CPT | Mod: CPTII,S$GLB,, | Performed by: FAMILY MEDICINE

## 2024-11-07 PROCEDURE — 1159F MED LIST DOCD IN RCRD: CPT | Mod: CPTII,S$GLB,, | Performed by: FAMILY MEDICINE

## 2024-11-07 PROCEDURE — 99999 PR PBB SHADOW E&M-EST. PATIENT-LVL III: CPT | Mod: PBBFAC,,, | Performed by: FAMILY MEDICINE

## 2024-11-07 RX ORDER — CETIRIZINE HYDROCHLORIDE 10 MG/1
10 TABLET ORAL DAILY
COMMUNITY

## 2024-11-07 NOTE — PROGRESS NOTES
"Well Woman VISIT      CHIEF COMPLAINT  Chief Complaint   Patient presents with    Annual Exam       HPI  Katelin Leonard is a 39 y.o. female who presents for annual exam and to establish care. Only issue reported is Right Heel swelling/pain that is worse after long day of activity. No trauma to area prior to localized pain/swelling. Wears [employer] tennis shoes most of the day and flip-flops sometimes.     Social Factors  Tobacco use: No  Ready to Quit: N/A  Alcohol: No  Intimate partner violence screening  "Do you feel safe in your current relationship?" Yes,  with 3 children (18, 11, and 6 y.o.)   "Have you ever been in a relationship in which your partner frightened you or hurt you?" No  Living Will/POA: No  Regular Exercise: Yes, walks up to 2 miles a day.     Depression  Over the past two weeks, have you felt down, depressed, or hopeless? No  Over the past two weeks, have you felt little interest or pleasure in doing things? No    Reproductive Health  Last menstrual period began 10/2/23 and was normal.   Patient is sexually active.   Contraception: no method  On Daily folic acid:  N/A  STD screening in last year: deferred - no concerns  Last PAP: can't remember, is due.   HIV screening: deferred - no concerns  Currently searching for GYN covered by her insurance    CHD, HTN, DM2  CHD Risk Factors: obesity (BMI >= 30 kg/m2) and sedentary lifestyle  Women 45 years and older should be screened for dyslipidemia if at increased risk of CHD  Women 20 to 45 years of age should be screened for dyslipidemia if at increased risk of CHD  Asymptomatic adults with sustained blood pressure greater than 135/80 mm Hg (treated or untreated) should be screened for type 2 diabetes mellitus    Estimated body mass index is 33.03 kg/m² as calculated from the following:    Height as of this encounter: 5' 8" (1.727 m).    Weight as of this encounter: 98.5 kg (217 lb 4.2 oz).      Screening  Mammogram needed: Yes  Colonoscopy " needed: No  Osteoporosis screen needed: No     Women 50 to 74 years of age should be screened for breast cancer with mammography biennially.  Women should be screened for cervical cancer with Pap tests beginning at 21 years of age. Low-risk women should receive Pap testing every three years. Co-testing for human papillomavirus is an option beginning at 30 years of age, and can extend the screening interval to five years. Cervical cancer screening should be discontinued at 65 years of age or after total hysterectomy if the woman has a benign gynecologic history  Adults 50 to 75 years of age should be screened for colorectal cancer with an FOBT annually, sigmoidoscopy every five years with an FOBT every three years, or colonoscopy every 10 years.  Women 65 years and older should be screened for osteoporosis. Women younger than 65 years should be screened if the risk of fracture is greater than or equal to that of a 65-year-old white woman without additional risk factors.      Immunizations  up to date and documented    ALLERGIES and MEDS were verified.   PMHx, PSHx, FHx, SOCIALHx were updated as pertinent.    REVIEW OF SYSTEMS  Review of Systems   Constitutional: Negative.    HENT: Negative.  Negative for hearing loss.    Eyes: Negative.  Negative for discharge.   Respiratory: Negative.  Negative for wheezing.    Cardiovascular: Negative.  Negative for chest pain and palpitations.   Gastrointestinal: Negative.  Negative for constipation, diarrhea and vomiting.   Genitourinary: Negative.  Negative for hematuria.   Musculoskeletal:  Positive for joint pain and myalgias. Negative for back pain, falls and neck pain.        Right Heel with localized pain and swelling.    Skin: Negative.    Neurological: Negative.  Negative for headaches.   Endo/Heme/Allergies: Negative.    Psychiatric/Behavioral: Negative.           PHYSICAL EXAM  VITAL SIGNS: BP (P) 128/78 (BP Location: Left arm, Patient Position: Sitting)   Pulse 78    "Temp 97.7 °F (36.5 °C) (Oral)   Ht 5' 8" (1.727 m)   Wt 98.5 kg (217 lb 4.2 oz)   LMP 10/26/2024 (Exact Date)   SpO2 97%   BMI 33.03 kg/m²   GEN: Well developed, Well nourished, No acute distress.  HENT: Normocephalic, Atraumatic, Bilateral external ears normal, Nose normal, Oropharynx moist, No oral exudates.   Eyes: PERRL, EOMI, Conjunctiva normal, No discharge.   Neck: Supple, No tenderness.  Lymphatic: No cervical or supraclavicular lymphadenopathy noted.   Cardiovascular: Normal heart rate, Normal rhythm, No murmurs, No rubs, No gallops.   Thorax & Lungs: Normal breath sounds, No respiratory distress, No wheezing.  Abdomen: Soft, No tenderness, Bowel sounds normal.  Skin: Warm, Dry, No erythema, No rash.   Extremities: Right groin pain and lateral leg pain       ASSESSMENT/PLAN    Katelin was seen today for establish care.    Diagnoses and all orders for this visit:    Well woman exam without gynecological exam  -     CBC Auto Differential; Future  -     Comprehensive Metabolic Panel; Future  -     Lipid Panel; Future  -     Urinalysis; Future  - Labs to be done when fasting    Screening mammogram for breast cancer  -     scheduled     Chronic right hip pain  - xrays of right hip and pelvis today    Breast cancer screening by mammogram       .     Moisés Shi MD      "

## 2024-11-08 DIAGNOSIS — M25.551 RIGHT HIP PAIN: Primary | ICD-10-CM

## 2024-12-11 ENCOUNTER — PATIENT OUTREACH (OUTPATIENT)
Dept: ADMINISTRATIVE | Facility: HOSPITAL | Age: 39
End: 2024-12-11
Payer: COMMERCIAL

## 2024-12-12 ENCOUNTER — LAB VISIT (OUTPATIENT)
Dept: LAB | Facility: HOSPITAL | Age: 39
End: 2024-12-12
Attending: STUDENT IN AN ORGANIZED HEALTH CARE EDUCATION/TRAINING PROGRAM
Payer: COMMERCIAL

## 2024-12-12 DIAGNOSIS — Z00.00 WELL WOMAN EXAM WITHOUT GYNECOLOGICAL EXAM: ICD-10-CM

## 2024-12-12 DIAGNOSIS — D50.0 IRON DEFICIENCY ANEMIA DUE TO CHRONIC BLOOD LOSS: ICD-10-CM

## 2024-12-12 LAB
ALBUMIN SERPL BCP-MCNC: 4 G/DL (ref 3.5–5.2)
ALP SERPL-CCNC: 74 U/L (ref 40–150)
ALT SERPL W/O P-5'-P-CCNC: 25 U/L (ref 10–44)
ANION GAP SERPL CALC-SCNC: 6 MMOL/L (ref 8–16)
AST SERPL-CCNC: 20 U/L (ref 10–40)
BASOPHILS # BLD AUTO: 0.07 K/UL (ref 0–0.2)
BASOPHILS # BLD AUTO: 0.07 K/UL (ref 0–0.2)
BASOPHILS NFR BLD: 0.7 % (ref 0–1.9)
BASOPHILS NFR BLD: 0.7 % (ref 0–1.9)
BILIRUB SERPL-MCNC: 0.4 MG/DL (ref 0.1–1)
BUN SERPL-MCNC: 10 MG/DL (ref 6–20)
CALCIUM SERPL-MCNC: 8.8 MG/DL (ref 8.7–10.5)
CHLORIDE SERPL-SCNC: 104 MMOL/L (ref 95–110)
CHOLEST SERPL-MCNC: 199 MG/DL (ref 120–199)
CHOLEST/HDLC SERPL: 3.6 {RATIO} (ref 2–5)
CO2 SERPL-SCNC: 26 MMOL/L (ref 23–29)
CREAT SERPL-MCNC: 0.8 MG/DL (ref 0.5–1.4)
DIFFERENTIAL METHOD BLD: ABNORMAL
DIFFERENTIAL METHOD BLD: ABNORMAL
EOSINOPHIL # BLD AUTO: 0.7 K/UL (ref 0–0.5)
EOSINOPHIL # BLD AUTO: 0.7 K/UL (ref 0–0.5)
EOSINOPHIL NFR BLD: 6.7 % (ref 0–8)
EOSINOPHIL NFR BLD: 6.7 % (ref 0–8)
ERYTHROCYTE [DISTWIDTH] IN BLOOD BY AUTOMATED COUNT: 15 % (ref 11.5–14.5)
ERYTHROCYTE [DISTWIDTH] IN BLOOD BY AUTOMATED COUNT: 15 % (ref 11.5–14.5)
EST. GFR  (NO RACE VARIABLE): >60 ML/MIN/1.73 M^2
FERRITIN SERPL-MCNC: 8 NG/ML (ref 20–300)
GLUCOSE SERPL-MCNC: 70 MG/DL (ref 70–110)
HCT VFR BLD AUTO: 39.3 % (ref 37–48.5)
HCT VFR BLD AUTO: 39.3 % (ref 37–48.5)
HDLC SERPL-MCNC: 56 MG/DL (ref 40–75)
HDLC SERPL: 28.1 % (ref 20–50)
HGB BLD-MCNC: 12.2 G/DL (ref 12–16)
HGB BLD-MCNC: 12.2 G/DL (ref 12–16)
IMM GRANULOCYTES # BLD AUTO: 0.02 K/UL (ref 0–0.04)
IMM GRANULOCYTES # BLD AUTO: 0.02 K/UL (ref 0–0.04)
IMM GRANULOCYTES NFR BLD AUTO: 0.2 % (ref 0–0.5)
IMM GRANULOCYTES NFR BLD AUTO: 0.2 % (ref 0–0.5)
IRON SERPL-MCNC: 34 UG/DL (ref 30–160)
LDLC SERPL CALC-MCNC: 114.4 MG/DL (ref 63–159)
LYMPHOCYTES # BLD AUTO: 2.7 K/UL (ref 1–4.8)
LYMPHOCYTES # BLD AUTO: 2.7 K/UL (ref 1–4.8)
LYMPHOCYTES NFR BLD: 27 % (ref 18–48)
LYMPHOCYTES NFR BLD: 27 % (ref 18–48)
MCH RBC QN AUTO: 24.6 PG (ref 27–31)
MCH RBC QN AUTO: 24.6 PG (ref 27–31)
MCHC RBC AUTO-ENTMCNC: 31 G/DL (ref 32–36)
MCHC RBC AUTO-ENTMCNC: 31 G/DL (ref 32–36)
MCV RBC AUTO: 79 FL (ref 82–98)
MCV RBC AUTO: 79 FL (ref 82–98)
MONOCYTES # BLD AUTO: 0.7 K/UL (ref 0.3–1)
MONOCYTES # BLD AUTO: 0.7 K/UL (ref 0.3–1)
MONOCYTES NFR BLD: 6.8 % (ref 4–15)
MONOCYTES NFR BLD: 6.8 % (ref 4–15)
NEUTROPHILS # BLD AUTO: 5.9 K/UL (ref 1.8–7.7)
NEUTROPHILS # BLD AUTO: 5.9 K/UL (ref 1.8–7.7)
NEUTROPHILS NFR BLD: 58.6 % (ref 38–73)
NEUTROPHILS NFR BLD: 58.6 % (ref 38–73)
NONHDLC SERPL-MCNC: 143 MG/DL
NRBC BLD-RTO: 0 /100 WBC
NRBC BLD-RTO: 0 /100 WBC
PLATELET # BLD AUTO: 407 K/UL (ref 150–450)
PLATELET # BLD AUTO: 407 K/UL (ref 150–450)
PMV BLD AUTO: 8.8 FL (ref 9.2–12.9)
PMV BLD AUTO: 8.8 FL (ref 9.2–12.9)
POTASSIUM SERPL-SCNC: 3.6 MMOL/L (ref 3.5–5.1)
PROT SERPL-MCNC: 8.1 G/DL (ref 6–8.4)
RBC # BLD AUTO: 4.95 M/UL (ref 4–5.4)
RBC # BLD AUTO: 4.95 M/UL (ref 4–5.4)
SATURATED IRON: 8 % (ref 20–50)
SODIUM SERPL-SCNC: 136 MMOL/L (ref 136–145)
TOTAL IRON BINDING CAPACITY: 407 UG/DL (ref 250–450)
TRANSFERRIN SERPL-MCNC: 275 MG/DL (ref 200–375)
TRIGL SERPL-MCNC: 143 MG/DL (ref 30–150)
WBC # BLD AUTO: 10.06 K/UL (ref 3.9–12.7)
WBC # BLD AUTO: 10.06 K/UL (ref 3.9–12.7)

## 2024-12-12 PROCEDURE — 85025 COMPLETE CBC W/AUTO DIFF WBC: CPT | Performed by: STUDENT IN AN ORGANIZED HEALTH CARE EDUCATION/TRAINING PROGRAM

## 2024-12-12 PROCEDURE — 80061 LIPID PANEL: CPT | Performed by: FAMILY MEDICINE

## 2024-12-12 PROCEDURE — 82728 ASSAY OF FERRITIN: CPT | Performed by: STUDENT IN AN ORGANIZED HEALTH CARE EDUCATION/TRAINING PROGRAM

## 2024-12-12 PROCEDURE — 84466 ASSAY OF TRANSFERRIN: CPT | Performed by: STUDENT IN AN ORGANIZED HEALTH CARE EDUCATION/TRAINING PROGRAM

## 2024-12-12 PROCEDURE — 36415 COLL VENOUS BLD VENIPUNCTURE: CPT | Mod: PO | Performed by: STUDENT IN AN ORGANIZED HEALTH CARE EDUCATION/TRAINING PROGRAM

## 2024-12-12 PROCEDURE — 80053 COMPREHEN METABOLIC PANEL: CPT | Performed by: FAMILY MEDICINE

## 2024-12-16 ENCOUNTER — OFFICE VISIT (OUTPATIENT)
Dept: HEMATOLOGY/ONCOLOGY | Facility: CLINIC | Age: 39
End: 2024-12-16
Payer: COMMERCIAL

## 2024-12-16 VITALS
RESPIRATION RATE: 16 BRPM | HEIGHT: 68 IN | BODY MASS INDEX: 33.98 KG/M2 | SYSTOLIC BLOOD PRESSURE: 172 MMHG | OXYGEN SATURATION: 97 % | WEIGHT: 224.19 LBS | DIASTOLIC BLOOD PRESSURE: 109 MMHG | HEART RATE: 86 BPM

## 2024-12-16 DIAGNOSIS — D50.0 IRON DEFICIENCY ANEMIA DUE TO CHRONIC BLOOD LOSS: Primary | ICD-10-CM

## 2024-12-16 DIAGNOSIS — Z71.2 ENCOUNTER TO DISCUSS TEST RESULTS: ICD-10-CM

## 2024-12-16 DIAGNOSIS — Z09 FOLLOW-UP EXAM: ICD-10-CM

## 2024-12-16 PROCEDURE — 99999 PR PBB SHADOW E&M-EST. PATIENT-LVL IV: CPT | Mod: PBBFAC,,, | Performed by: STUDENT IN AN ORGANIZED HEALTH CARE EDUCATION/TRAINING PROGRAM

## 2024-12-16 PROCEDURE — 3077F SYST BP >= 140 MM HG: CPT | Mod: CPTII,S$GLB,, | Performed by: STUDENT IN AN ORGANIZED HEALTH CARE EDUCATION/TRAINING PROGRAM

## 2024-12-16 PROCEDURE — 1159F MED LIST DOCD IN RCRD: CPT | Mod: CPTII,S$GLB,, | Performed by: STUDENT IN AN ORGANIZED HEALTH CARE EDUCATION/TRAINING PROGRAM

## 2024-12-16 PROCEDURE — 3080F DIAST BP >= 90 MM HG: CPT | Mod: CPTII,S$GLB,, | Performed by: STUDENT IN AN ORGANIZED HEALTH CARE EDUCATION/TRAINING PROGRAM

## 2024-12-16 PROCEDURE — 99214 OFFICE O/P EST MOD 30 MIN: CPT | Mod: S$GLB,,, | Performed by: STUDENT IN AN ORGANIZED HEALTH CARE EDUCATION/TRAINING PROGRAM

## 2024-12-16 PROCEDURE — 3008F BODY MASS INDEX DOCD: CPT | Mod: CPTII,S$GLB,, | Performed by: STUDENT IN AN ORGANIZED HEALTH CARE EDUCATION/TRAINING PROGRAM

## 2024-12-16 NOTE — Clinical Note
-Recheck Bp manually on way out -Give 2 dose of IV iron venofer -RTC in 3 months for CBC, iron, ferritin only and in 6 months for MD virtual visit and repeat labs CBC, iron and ferritin day prior

## 2024-12-16 NOTE — PROGRESS NOTES
Hematology- Oncology Clinic Note :     2024    Chief Complaint   Patient presents with    Follow-up    Anemia         HPI  Pt is a 39 y.o.  female with pmhx of C sections who presents for follow up of iron def anemia.  Pt endorses chronic and heavy cycles (for first 2 days) that are unchanged and mild fatigue has improved and she is feeling overall better.  Labs reviewed with pt and hb now wnl but ferritin still low.  Pt agreeable to additional IV iron as her cycles are still heavy but is planning to start birth control with gyn.   No other issues at time of exam.         Active Problem List with Overview Notes    Diagnosis Date Noted    Iron deficiency anemia 2023    H/O:  2017       Patient Active Problem List    Diagnosis Date Noted    Iron deficiency anemia 2023    H/O:  2017     Past Medical History:   Diagnosis Date    Allergy       Past Surgical History:   Procedure Laterality Date     SECTION      x2      (Not in a hospital admission)    Review of patient's allergies indicates:  No Known Allergies   Social History     Tobacco Use    Smoking status: Never     Passive exposure: Never    Smokeless tobacco: Not on file   Substance Use Topics    Alcohol use: No      Family History   Problem Relation Name Age of Onset    Hypertension Mother      Cancer Brother      Breast cancer Neg Hx      Colon cancer Neg Hx      Ovarian cancer Neg Hx          Review of Systems :  Review of Systems   Constitutional:  Negative for chills, fever and malaise/fatigue.   HENT:  Negative for congestion, hearing loss and nosebleeds.    Eyes:  Negative for blurred vision and photophobia.   Respiratory:  Negative for cough, hemoptysis, sputum production and shortness of breath.    Cardiovascular:  Negative for chest pain and leg swelling.   Gastrointestinal:  Negative for abdominal pain, blood in stool, constipation, diarrhea, heartburn, melena, nausea and vomiting.    Genitourinary:  Negative for dysuria and hematuria.   Musculoskeletal:  Negative for myalgias.   Skin:  Negative for itching and rash.   Neurological:  Negative for dizziness and focal weakness.   Endo/Heme/Allergies:  Does not bruise/bleed easily.   Psychiatric/Behavioral:  The patient is not nervous/anxious.        Physical Exam :  Wt Readings from Last 3 Encounters:   12/16/24 101.7 kg (224 lb 3.3 oz)   11/07/24 98.5 kg (217 lb 4.2 oz)   02/14/24 96.4 kg (212 lb 8.4 oz)     Temp Readings from Last 3 Encounters:   11/07/24 97.7 °F (36.5 °C) (Oral)   09/18/24 98.1 °F (36.7 °C) (Oral)   03/19/24 97.3 °F (36.3 °C) (Oral)     BP Readings from Last 3 Encounters:   12/16/24 (!) 172/109   11/07/24 (P) 128/78   09/18/24 (!) 155/86     Pulse Readings from Last 3 Encounters:   12/16/24 86   11/07/24 78   09/18/24 86     Body mass index is 34.09 kg/m².    Physical Exam  Vitals reviewed.   HENT:      Head: Normocephalic and atraumatic.      Nose: Nose normal.      Mouth/Throat:      Mouth: Mucous membranes are moist.   Eyes:      Pupils: Pupils are equal, round, and reactive to light.   Cardiovascular:      Rate and Rhythm: Normal rate and regular rhythm.      Heart sounds: Normal heart sounds.   Pulmonary:      Effort: Pulmonary effort is normal.      Breath sounds: Normal breath sounds.   Abdominal:      General: Abdomen is flat.   Musculoskeletal:         General: Normal range of motion.      Cervical back: Normal range of motion and neck supple.   Skin:     General: Skin is warm and dry.   Neurological:      Mental Status: She is alert. Mental status is at baseline.   Psychiatric:         Mood and Affect: Mood normal.         Behavior: Behavior normal.           Pertinent Diagnostic studies:    No results found for this or any previous visit (from the past 24 hours).    Assessment/Plan :     ECOG 0      Iron def anemia  -Most likely secondary to chronic heavy cycles  -Denies dark stools or any other signs of  bleeding  -Follows with OBGYN  -vitamin labs wnl  -Path rev CBC: Red blood cells- microcytic hypochromic anemia with   anisopoikilocytosis   -White blood cells- normal in number with  slight relative and   absolute eosinophilia No morphologic abnormalities nor blasts on scanning   -Iron levels improving post IV iron and hb now wnl  -Ferritin still low end of normal, will give 2 dose of IV iron  -RTC in 3 months for CBC, iron, ferritin only and in 6 months for MD virtual visit and repeat labs CBC, iron and ferritin day prior          Time spent on case: 30 minutes      Summary of orders placed this encounter:  Orders Placed This Encounter   Procedures    CBC W/ AUTO DIFFERENTIAL    IRON AND TIBC    Ferritin       Future Appointments   Date Time Provider Department Center   12/17/2024  8:00 AM Ross Woods Jr., PT [facility]   3/17/2025  7:10 AM LAB, [facility]   6/16/2025  7:10 AM LAB, [facility]           Kirstie Hernandez MD   Hematology/oncology, [facility] - [place]

## 2024-12-17 ENCOUNTER — CLINICAL SUPPORT (OUTPATIENT)
Dept: REHABILITATION | Facility: HOSPITAL | Age: 39
End: 2024-12-17
Attending: FAMILY MEDICINE
Payer: COMMERCIAL

## 2024-12-17 DIAGNOSIS — R53.1 WEAKNESS: Primary | ICD-10-CM

## 2024-12-17 DIAGNOSIS — M25.651 HIP STIFFNESS, RIGHT: ICD-10-CM

## 2024-12-17 DIAGNOSIS — M25.551 RIGHT HIP PAIN: ICD-10-CM

## 2024-12-17 DIAGNOSIS — R68.89 DECREASED FUNCTIONAL ACTIVITY TOLERANCE: ICD-10-CM

## 2024-12-17 PROCEDURE — 97161 PT EVAL LOW COMPLEX 20 MIN: CPT | Mod: PN

## 2024-12-17 PROCEDURE — 97110 THERAPEUTIC EXERCISES: CPT | Mod: PN

## 2024-12-17 PROCEDURE — 97140 MANUAL THERAPY 1/> REGIONS: CPT | Mod: PN

## 2024-12-17 NOTE — PLAN OF CARE
"  [facility]  Physical Therapy Initial Evaluation    Date: 12/17/2024   Name: Katelin Head  Clinic Number: [id number]    Therapy Diagnosis:   Encounter Diagnoses   Name Primary?    Right hip pain     Weakness Yes    Decreased functional activity tolerance     Hip stiffness, right      Physician: Moisés Shi MD    Physician Orders: PT Eval and Treat   Medical Diagnosis from Referral: M25.551 (ICD-10-CM) - Right hip pain   Evaluation Date: 12/17/2024  Authorization Period Expiration: 11/8/2025  Plan of Care Expiration: 3/17/2025  Visit # / Visits authorized: 1/ 1 Progress Note Due: 1/17/2025  FOTO: 1/ 1    Precautions: Standard    Time In: 8:00am  Time Out: 9:00am  Total Appointment Time (timed & untimed codes): 60 minutes    Subjective   Date of onset: 2 months ago  History of current condition - Katelin reports: R hip pain that goes from the hip to the knee. She says the pain comes on while cutting grass and walking on uneven ground. Pt says she has temporary relief from stretching and has continued with daily activities despite pain at times.  Pt denies numbness, tingling, burning, or specific CODY.     CODY: none    Any popping: none   Any locking: none  Any clicking: none  Pain radiates: none  Pain is constant or intermittent: intermittent   Any injections:  none    Pain:  Current 3/10, worst 10/10, best 0/10   Location: R hip/ knee  Description: sharp pain   Aggravating Factors: cutting grass, walking more than 10,000 steps, prolonged sitting  Easing Factors: stretching, ice    Prior Therapy: none  Social History:  lives with their family  Occupation: unemployed   Prior Level of Function: independent  Current level of function: independent with difficulty    Pts goals: decrease pain, learn pain management strategy     Imaging, bone scan films:   FINDINGS:  "No fracture or dislocation.  Hip cartilage space is maintained.  No suspicious appearing lytic or blastic lesions.  Note made of " "transitional lumbosacral anatomy."     Medical History:   Past Medical History:   Diagnosis Date    Allergy        Surgical History:   Katelin Leonard  has a past surgical history that includes  section.    Medications:   Katelin has a current medication list which includes the following prescription(s): cetirizine.    Allergies:   Review of patient's allergies indicates:  No Known Allergies       Objective       SLS: R pain, L normal     Squat: painful knees, 75% depth, mild genu valgus     GAIT DEVIATIONS: Katelin displays mild B trendelenburg     Hip Range of Motion: WFL      Lower Extremity Strength  Right LE  Left LE    Knee extension: 5/5 Knee extension: 4/5   Knee flexion: 5/5 Knee flexion: 5/5   Hip flexion: 5/5 Hip flexion: 5/5   Hip Internal Rotation:  4+/5    Hip Internal Rotation: 4+/5      Hip External Rotation: 4+/5    Hip External Rotation: 4+/5      Hip extension:  4+/5 Hip extension: 4+/5   Hip abduction: 4-/5 Hip abduction: 4-/5   Hip adduction: NT Hip adduction: NT   Ankle dorsiflexion: 5/5 Ankle dorsiflexion: 5/5   Ankle plantarflexion: 5/5 Ankle plantarflexion: 5/5     Special tests:  DAVONTE: positive hip tightness but no pain  FADIR: negative  Scour: negative  Noble's compression: R positive      Flexibility:    Ely's test: B min restriction   Arin's test: R positive     Palpation: tender TFL, ITB, Lateral thigh        CMS Impairment/Limitation/Restriction for MITCHELL HORTON JR. Survey    Therapist reviewed FOTO scores for Katelin Leonard on 2024.   FOTO documents entered into [employer] - see Media section.    Limitation Score: 76.8% Lower score= greater disability            TREATMENT   Treatment Time In: 8:35am  Treatment Time Out: 9:00am  Total Treatment time separate from Evaluation: 23 minutes    Katelin received therapeutic exercises to develop strength and flexibility for 15 minutes including:  Mini squats RTB @ knees  Sidelying hip abduction   ITB stretch   Prone quad stretch     Katelin received the " following manual therapy techniques:  were applied to the: for 8 minutes, including:  Myofascial release TFL  IASTM roller on lateral quad/ thigh     Katelin participated in neuromuscular re-education activities to improve:  for 00 minutes. The following activities were included:      Katelin participated in dynamic functional therapeutic activities to improve functional performance for 00  minutes, including:      Katelin participated in gait training to improve functional mobility and safety for 00  minutes, including:      Katelin received hot or cold pack for 00 minutes.    Home Exercises and Patient Education Provided    Education provided:   - PT role, intervention rationale     Written Home Exercises Provided: yes.  Exercises were reviewed and Katelin was able to demonstrate them prior to the end of the session.  Katelin demonstrated good  understanding of the education provided.     See EMR under Patient Instructions for exercises provided 12/17/2024.    Assessment   Katelin is a 39 y.o. 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 Upon evaluation, pt demonstrated pain, stiffness, and weakness which impacts recreational activities, ADL's and community ambulation due to decreased activity tolerance. PT will emphasize impairments in order to restore pt to PLOF.      Pt prognosis is Good.   Pt will benefit from skilled outpatient Physical Therapy to address the deficits stated above and in the chart below, provide pt/family education, and to maximize pt's level of independence.     Plan of care discussed with patient: Yes  Pt's spiritual, cultural and educational needs considered and patient is agreeable to the plan of care and goals as stated below:     Anticipated Barriers for therapy: none    Medical Necessity is demonstrated by the following  History  Co-morbidities and personal factors that may impact the plan of care [x] LOW: no  personal factors / co-morbidities  [] MODERATE: 1-2 personal factors / co-morbidities  [] HIGH: 3+ personal factors / co-morbidities    Moderate / High Support Documentation:   Co-morbidities affecting plan of care:   Allergy  Caesarean section     Personal Factors:   no deficits     Examination  Body Structures and Functions, activity limitations and participation restrictions that may impact the plan of care [x] LOW: addressing 1-2 elements  [] MODERATE: 3+ elements  [] HIGH: 4+ elements (please support below)    Moderate / High Support Documentation:      Clinical Presentation [x] LOW: stable  [] MODERATE: Evolving  [] HIGH: Unstable     Decision Making/ Complexity Score: low       GOALS: Short Term Goals:  2-3 weeks  1.Report decreased in pain at worse less than <   / =  4  /10  to increase tolerance for functional activities. On going  2. Increased B Hip  MMT 1/2  to increase tolerance for ADL and work activities.On going  3. Pt to tolerate HEP to improve ROM and independence with ADL's.On going    Long Term Goals:  6-8 weeks  1.Report decreased in pain at worse less than  <   / =  0  /10  to increase tolerance for functional mobility. On going  2.Increased B hip MMT 1 grade  to increase tolerance for ADL and work activities.On going  3. Pt will scores report 5% limitation on FOTO hip survey  to demonstrate increase in LE function with every day tasks. On going  4. Pt to be Independent with HEP to improve ROM and independence with ADL's. On going  5. Pt will be able to walk on uneven surfaces for 1 hr with no difficulty in order to show improved tolerance for cutting grass. Ongoing       Plan   Plan of care Certification: 12/17/2024 to 3/17/2025.    Outpatient Physical Therapy 2 times weekly for 8 weeks to include the following interventions: Gait Training, Manual Therapy, Moist Heat/ Ice, Neuromuscular Re-ed, Patient Education, Self Care, Therapeutic Activities, and Therapeutic Exercise. Dry  seun Woods Jr, PT

## 2024-12-24 ENCOUNTER — CLINICAL SUPPORT (OUTPATIENT)
Dept: REHABILITATION | Facility: HOSPITAL | Age: 39
End: 2024-12-24
Payer: COMMERCIAL

## 2024-12-24 DIAGNOSIS — R68.89 DECREASED FUNCTIONAL ACTIVITY TOLERANCE: ICD-10-CM

## 2024-12-24 DIAGNOSIS — R53.1 WEAKNESS: Primary | ICD-10-CM

## 2024-12-24 DIAGNOSIS — M25.651 HIP STIFFNESS, RIGHT: ICD-10-CM

## 2024-12-24 PROCEDURE — 97112 NEUROMUSCULAR REEDUCATION: CPT | Mod: PN,CQ

## 2024-12-24 PROCEDURE — 97530 THERAPEUTIC ACTIVITIES: CPT | Mod: PN,CQ

## 2024-12-24 PROCEDURE — 97110 THERAPEUTIC EXERCISES: CPT | Mod: PN,CQ

## 2024-12-24 NOTE — PROGRESS NOTES
"  [facility]   Physical Therapy Treatment Note     Name: Katelin Head  Clinic Number: [id number]    Therapy Diagnosis:   Encounter Diagnoses   Name Primary?    Weakness Yes    Decreased functional activity tolerance     Hip stiffness, right      Physician: Moisés Shi MD    Visit Date: 12/24/2024    Physician Orders: PT Eval and Treat   Medical Diagnosis from Referral: M25.551 (ICD-10-CM) - Right hip pain   Evaluation Date: 12/17/2024  Authorization Period Expiration: 11/8/2025  Plan of Care Expiration: 3/17/2025  Visit # / Visits authorized: 1/ 25 Progress Note Due: 1/17/2025  FOTO: 1/ 1     Precautions: Standard    Visit #: 1 of 25  PTA Visit #: 1/5  Time In: 7:00 am  Time Out: 7:55 am  Total Billable Time: 55 minutes    Subjective     Pt reports: feeling ok this morning, the pain is not bad.  She  n/a  compliant with home exercise program.  Response to previous treatment: first treatment day  Functional change: ongoing    Pain: 3/10  Location: Right Hip      Objective     Katelin participated in dynamic functional therapeutic activities to improve functional performance for 10  minutes, including:    Nu-step 10 min level 1    Katelin received therapeutic exercises to develop strength, endurance, ROM, flexibility, posture, and core stabilization for 25 minutes including:    Long Sitting Hamstring stretch 3x30" RLE  Supine Hip fall out stretch 5x10" RLE  SL IT band stretch 4x30" lying on Left side  SL QL stretch 4x30" lying on Left side  Prone quad stretch 4x30" RLE    Katelin participated in neuromuscular re-education activities to improve: Balance, Coordination, Proprioception, Posture, and muscle control for 15 minutes. The following activities were included:    Hooklying Clamshell with RTB 2x10x3"  Hooklying Ball Squeeze 2x10x3"  Hooklying Bridges with RTB around knees 2x10x3"    To add:  Supine SLR 2x10  SL Hip Abduction 2x10  Prone Hip Extension 2x10    Katelin received the following " manual therapy techniques: Myofacial release and Soft tissue Mobilization were applied to the: Right hip/IT band/Quad for 5 minutes, including:    Myofascial release TFL  IASTM roller on lateral quad/ thigh       Home Exercises Provided and Patient Education Provided     Written Home Exercises Provided: Patient instructed to cont prior HEP.  Exercises were reviewed and Katelin was able to demonstrate them prior to the end of the session.  Katelin demonstrated good  understanding of the education provided.     Education provided:    HEPs      Assessment     Patient came for her first therapy treatment after the initial evaluation, reporting feeling ok this morning, the pain is not bad about 3/10. Focus on improving flexibility/ROM/muscles strength for functional mobility and pain relief. Verbal and tactile instructions to ensure patient perform all exercises with good form, proper technique, and muscle activation. Printed HEP with instructions and demonstrations provided to patient and instructed her to cont to work on her HEP, she verbalized understanding. Will continue to progress per patient tolerance.      Katelin Is progressing well towards her goals.   Pt prognosis is Good.     Pt will continue to benefit from skilled outpatient physical therapy to address the deficits listed in the problem list box on initial evaluation, provide pt/family education and to maximize pt's level of independence in the home and community environment.     Pt's spiritual, cultural and educational needs considered and pt agreeable to plan of care and goals.     Anticipated barriers to physical therapy: none    Goals:   Short Term Goals:  2-3 weeks  1.Report decreased in pain at worse less than <   / =  4  /10  to increase tolerance for functional activities. On going  2. Increased B Hip  MMT 1/2  to increase tolerance for ADL and work activities.On going  3. Pt to tolerate HEP to improve ROM and independence with ADL's.On going     Long  Term Goals:  6-8 weeks  1.Report decreased in pain at worse less than  <   / =  0  /10  to increase tolerance for functional mobility. On going  2.Increased B hip MMT 1 grade  to increase tolerance for ADL and work activities.On going  3. Pt will scores report 5% limitation on FOTO hip survey  to demonstrate increase in LE function with every day tasks. On going  4. Pt to be Independent with HEP to improve ROM and independence with ADL's. On going  5. Pt will be able to walk on uneven surfaces for 1 hr with no difficulty in order to show improved tolerance for cutting grass. Ongoing     Plan     Plan of care Certification: 12/17/2024 to 3/17/2025.     Outpatient Physical Therapy 2 times weekly for 8 weeks to include the following interventions: Gait Training, Manual Therapy, Moist Heat/ Ice, Neuromuscular Re-ed, Patient Education, Self Care, Therapeutic Activities, and Therapeutic Exercise. Dry needling    Gómez To, PTA   12/24/2024

## 2024-12-31 ENCOUNTER — CLINICAL SUPPORT (OUTPATIENT)
Dept: REHABILITATION | Facility: HOSPITAL | Age: 39
End: 2024-12-31
Payer: COMMERCIAL

## 2024-12-31 DIAGNOSIS — M25.651 HIP STIFFNESS, RIGHT: ICD-10-CM

## 2024-12-31 DIAGNOSIS — R53.1 WEAKNESS: Primary | ICD-10-CM

## 2024-12-31 DIAGNOSIS — R68.89 DECREASED FUNCTIONAL ACTIVITY TOLERANCE: ICD-10-CM

## 2024-12-31 PROCEDURE — 97112 NEUROMUSCULAR REEDUCATION: CPT | Mod: PN,CQ

## 2024-12-31 PROCEDURE — 97530 THERAPEUTIC ACTIVITIES: CPT | Mod: PN,CQ

## 2024-12-31 PROCEDURE — 97110 THERAPEUTIC EXERCISES: CPT | Mod: PN,CQ

## 2024-12-31 NOTE — PROGRESS NOTES
" [facility]   Physical Therapy Treatment Note     Name: Katelin Head  Clinic Number: [id number]    Therapy Diagnosis:   Encounter Diagnoses   Name Primary?    Weakness Yes    Decreased functional activity tolerance     Hip stiffness, right      Physician: Moisés Shi MD    Visit Date: 12/31/2024    Physician Orders: PT Eval and Treat   Medical Diagnosis from Referral: M25.551 (ICD-10-CM) - Right hip pain   Evaluation Date: 12/17/2024  Authorization Period Expiration: 11/8/2025  Plan of Care Expiration: 3/17/2025  Visit # / Visits authorized: 1/ 25 Progress Note Due: 1/17/2025  FOTO: 1/ 1     Precautions: Standard    Visit #: 1 of 25  PTA Visit #: 1/5  Time In: 7:00 am  Time Out: 7:55 am  Total Billable Time: 55 minutes    Subjective     Pt reports: feeling ok this morning, a little sore but better than before since she starts therapy.  She  n/a  compliant with home exercise program.  Response to previous treatment: 2nd treatment day  Functional change: ongoing    Pain: 2/10  Location: Right Hip      Objective     Katelin participated in dynamic functional therapeutic activities to improve functional performance for 10  minutes, including:    Nu-step 10 min level 1    Katelin received therapeutic exercises to develop strength, endurance, ROM, flexibility, posture, and core stabilization for 20 minutes including:    Long Sitting Hamstring stretch 3x30" RLE  Supine Hip fall out stretch 5x10" RLE  SL IT band stretch 4x30" lying on Left side  SL QL stretch 4x30" lying on Left side  +Supine Hip Flexors + Quad stretch 4x30"  Prone quad stretch 4x30" RLE    Katelin participated in neuromuscular re-education activities to improve: Balance, Coordination, Proprioception, Posture, and muscle control for 25 minutes. The following activities were included:    Hooklying Clamshell with RTB 2x10x3"  Hooklying Ball Squeeze 2x10x3"  Hooklying Bridges with RTB around knees 2x10x3"  +Supine SLR with 2# 2x10  +SL " Hip Abduction 2x10 (to add weight next visit)  +Prone Hip Extension 2x10 (to add weight next visit)    Katelin received the following manual therapy techniques: Myofacial release and Soft tissue Mobilization were applied to the: Right hip/IT band/Quad for 5 minutes, including:    Myofascial release TFL  IASTM roller on lateral quad/ thigh       Home Exercises Provided and Patient Education Provided     Written Home Exercises Provided: Patient instructed to cont prior HEP.  Exercises were reviewed and Katelin was able to demonstrate them prior to the end of the session.  Katelin demonstrated good  understanding of the education provided.     Education provided:    HEPs      Assessment     Patient came for her second therapy treatment after the initial evaluation, reporting a little sore but better than before since she starts therapy. We cont to focus on improving flexibility/ROM/muscles strength for functional mobility and pain relief. Added SLR, Hip Abd, Hip Ext, and Hip Flexor stretch this visit, she tolerated well. Verbal and tactile instructions to ensure patient perform all exercises with good form, proper technique, and muscle activation. Instructed her to cont to work on her HEP, she verbalized understanding. Will continue to progress per patient tolerance.      Katelin Is progressing well towards her goals.   Pt prognosis is Good.     Pt will continue to benefit from skilled outpatient physical therapy to address the deficits listed in the problem list box on initial evaluation, provide pt/family education and to maximize pt's level of independence in the home and community environment.     Pt's spiritual, cultural and educational needs considered and pt agreeable to plan of care and goals.     Anticipated barriers to physical therapy: none    Goals:   Short Term Goals:  2-3 weeks  1.Report decreased in pain at worse less than <   / =  4  /10  to increase tolerance for functional activities. On going  2. Increased B  Hip  MMT 1/2  to increase tolerance for ADL and work activities.On going  3. Pt to tolerate HEP to improve ROM and independence with ADL's.On going     Long Term Goals:  6-8 weeks  1.Report decreased in pain at worse less than  <   / =  0  /10  to increase tolerance for functional mobility. On going  2.Increased B hip MMT 1 grade  to increase tolerance for ADL and work activities.On going  3. Pt will scores report 5% limitation on FOTO hip survey  to demonstrate increase in LE function with every day tasks. On going  4. Pt to be Independent with HEP to improve ROM and independence with ADL's. On going  5. Pt will be able to walk on uneven surfaces for 1 hr with no difficulty in order to show improved tolerance for cutting grass. Ongoing     Plan     Plan of care Certification: 12/17/2024 to 3/17/2025.     Outpatient Physical Therapy 2 times weekly for 8 weeks to include the following interventions: Gait Training, Manual Therapy, Moist Heat/ Ice, Neuromuscular Re-ed, Patient Education, Self Care, Therapeutic Activities, and Therapeutic Exercise. Dry needling    Gómez To, PTA   12/31/2024

## 2025-01-07 ENCOUNTER — CLINICAL SUPPORT (OUTPATIENT)
Dept: REHABILITATION | Facility: HOSPITAL | Age: 40
End: 2025-01-07
Payer: COMMERCIAL

## 2025-01-07 ENCOUNTER — INFUSION (OUTPATIENT)
Dept: INFUSION THERAPY | Facility: HOSPITAL | Age: 40
End: 2025-01-07
Attending: STUDENT IN AN ORGANIZED HEALTH CARE EDUCATION/TRAINING PROGRAM
Payer: COMMERCIAL

## 2025-01-07 VITALS
RESPIRATION RATE: 18 BRPM | OXYGEN SATURATION: 99 % | HEART RATE: 99 BPM | TEMPERATURE: 98 F | DIASTOLIC BLOOD PRESSURE: 101 MMHG | SYSTOLIC BLOOD PRESSURE: 172 MMHG

## 2025-01-07 DIAGNOSIS — R53.1 WEAKNESS: Primary | ICD-10-CM

## 2025-01-07 DIAGNOSIS — D50.0 IRON DEFICIENCY ANEMIA DUE TO CHRONIC BLOOD LOSS: Primary | ICD-10-CM

## 2025-01-07 DIAGNOSIS — R68.89 DECREASED FUNCTIONAL ACTIVITY TOLERANCE: ICD-10-CM

## 2025-01-07 DIAGNOSIS — M25.651 HIP STIFFNESS, RIGHT: ICD-10-CM

## 2025-01-07 PROCEDURE — 97112 NEUROMUSCULAR REEDUCATION: CPT | Mod: PN,CQ

## 2025-01-07 PROCEDURE — 63600175 PHARM REV CODE 636 W HCPCS: Performed by: STUDENT IN AN ORGANIZED HEALTH CARE EDUCATION/TRAINING PROGRAM

## 2025-01-07 PROCEDURE — 97530 THERAPEUTIC ACTIVITIES: CPT | Mod: PN,CQ

## 2025-01-07 PROCEDURE — 96374 THER/PROPH/DIAG INJ IV PUSH: CPT

## 2025-01-07 RX ADMIN — IRON SUCROSE 200 MG: 20 INJECTION, SOLUTION INTRAVENOUS at 12:01

## 2025-01-07 NOTE — PLAN OF CARE
Patient ambulated onto unit for venofer, VSS, no s/s of distress. Plan of care reviewed with patient. Venofer given IVP. Patient tolerated treatment well. Next appt reminder given. Patient ambulated off unit, no s/s of distress.

## 2025-01-07 NOTE — PROGRESS NOTES
"  [facility]   Physical Therapy Treatment Note     Name: Katelin Head  Clinic Number: [id number]    Therapy Diagnosis:   Encounter Diagnoses   Name Primary?    Weakness Yes    Decreased functional activity tolerance     Hip stiffness, right      Physician: Moisés Shi MD    Visit Date: 1/7/2025    Physician Orders: PT Eval and Treat   Medical Diagnosis from Referral: M25.551 (ICD-10-CM) - Right hip pain   Evaluation Date: 12/17/2024  Authorization Period Expiration: 12/31/2025  Plan of Care Expiration: 3/17/2025  Visit # / Visits authorized: 1/ 20 Progress Note Due: 1/17/2025  FOTO: 1/ 1     Precautions: Standard    Visit #: 1 of 20  PTA Visit #: 3/5  Time In: 9:00 am  Time Out: 9:55 am  Total Billable Time: 55 minutes    Subjective     Pt reports: feeling good this morning, pain and tightness has improved a lot since therapy, pain level is 1/10 today.  She  n/a  compliant with home exercise program.  Response to previous treatment: improving  Functional change: ongoing    Pain: 1/10  Location: Right lateral/anterior thigh      Objective     Katelin participated in dynamic functional therapeutic activities to improve functional performance for 25 minutes, including:    Nu-step 10 min level 3  +Shuttle DL 2 Black bands 3x10  +Sit to Stand from 20" box holding 15# KB 2x10  +Matrix Hip Abd 20# 3x10  +Matrix Hip Add 20# 3x10    Katelin received therapeutic exercises to develop strength, endurance, ROM, flexibility, posture, and core stabilization for 03 minutes including:     Long Sitting Hamstring stretch 3x30" RLE  Supine Hip fall out stretch 5x10" RLE  SL IT band stretch 4x30" lying on Left side  SL QL stretch 4x30" lying on Left side  Supine Hip Flexors + Quad stretch 4x30"  Prone quad stretch 4x30" RLE    Katelin participated in neuromuscular re-education activities to improve: Balance, Coordination, Proprioception, Posture, and muscle control for 25 minutes. The following activities were " "included:    Hooklying Bridges with RTB around knees 3x10x3"  Supine SLR with 2# 3x10 BLE  SL Hip Abduction 2x10 BLE  Prone Hip Extension 2x10 BLE    Katelin received the following manual therapy techniques: Myofacial release and Soft tissue Mobilization were applied to the: Right hip/IT band/Quad for 5 minutes, including:    Myofascial release TFL  IASTM roller on lateral quad/ thigh       Home Exercises Provided and Patient Education Provided     Written Home Exercises Provided: Patient instructed to cont prior HEP.  Exercises were reviewed and Katelin was able to demonstrate them prior to the end of the session.  Katelin demonstrated good  understanding of the education provided.     Education provided:    HEPs      Assessment     Patient presents to clinic with reporting pain and tightness has improved a lot since therapy, pain level is 1/10 today. We added Shuttle DL, Matrix hip Add/Abd, Sit to Stand, and 2# weight to Hip Abd/Ext this visit, she tolerated well. Verbal and tactile instructions to ensure patient perform all exercises with good form, proper technique, and muscle activation. Printed HEP with instructions and demonstrations provided to patient, instructed her to cont to work on her stretching ex, she verbalized understanding. Will continue to progress per patient tolerance.      Katelin Is progressing well towards her goals.   Pt prognosis is Good.     Pt will continue to benefit from skilled outpatient physical therapy to address the deficits listed in the problem list box on initial evaluation, provide pt/family education and to maximize pt's level of independence in the home and community environment.     Pt's spiritual, cultural and educational needs considered and pt agreeable to plan of care and goals.     Anticipated barriers to physical therapy: none    Goals:   Short Term Goals:  2-3 weeks  1.Report decreased in pain at worse less than <   / =  4  /10  to increase tolerance for functional " activities. On going  2. Increased B Hip  MMT 1/2  to increase tolerance for ADL and work activities.On going  3. Pt to tolerate HEP to improve ROM and independence with ADL's.On going     Long Term Goals:  6-8 weeks  1.Report decreased in pain at worse less than  <   / =  0  /10  to increase tolerance for functional mobility. On going  2.Increased B hip MMT 1 grade  to increase tolerance for ADL and work activities.On going  3. Pt will scores report 5% limitation on FOTO hip survey  to demonstrate increase in LE function with every day tasks. On going  4. Pt to be Independent with HEP to improve ROM and independence with ADL's. On going  5. Pt will be able to walk on uneven surfaces for 1 hr with no difficulty in order to show improved tolerance for cutting grass. Ongoing     Plan     Plan of care Certification: 12/17/2024 to 3/17/2025.     Outpatient Physical Therapy 2 times weekly for 8 weeks to include the following interventions: Gait Training, Manual Therapy, Moist Heat/ Ice, Neuromuscular Re-ed, Patient Education, Self Care, Therapeutic Activities, and Therapeutic Exercise. Dry needling    Gómez To, PTA   1/7/2025

## 2025-01-14 ENCOUNTER — INFUSION (OUTPATIENT)
Dept: INFUSION THERAPY | Facility: HOSPITAL | Age: 40
End: 2025-01-14
Attending: STUDENT IN AN ORGANIZED HEALTH CARE EDUCATION/TRAINING PROGRAM
Payer: COMMERCIAL

## 2025-01-14 ENCOUNTER — CLINICAL SUPPORT (OUTPATIENT)
Dept: REHABILITATION | Facility: HOSPITAL | Age: 40
End: 2025-01-14
Payer: COMMERCIAL

## 2025-01-14 VITALS
DIASTOLIC BLOOD PRESSURE: 99 MMHG | HEART RATE: 83 BPM | OXYGEN SATURATION: 97 % | TEMPERATURE: 99 F | SYSTOLIC BLOOD PRESSURE: 182 MMHG | RESPIRATION RATE: 18 BRPM

## 2025-01-14 DIAGNOSIS — D50.0 IRON DEFICIENCY ANEMIA DUE TO CHRONIC BLOOD LOSS: Primary | ICD-10-CM

## 2025-01-14 DIAGNOSIS — R68.89 DECREASED FUNCTIONAL ACTIVITY TOLERANCE: ICD-10-CM

## 2025-01-14 DIAGNOSIS — M25.651 HIP STIFFNESS, RIGHT: ICD-10-CM

## 2025-01-14 DIAGNOSIS — R53.1 WEAKNESS: Primary | ICD-10-CM

## 2025-01-14 PROCEDURE — 97530 THERAPEUTIC ACTIVITIES: CPT | Mod: PN

## 2025-01-14 PROCEDURE — 97112 NEUROMUSCULAR REEDUCATION: CPT | Mod: PN

## 2025-01-14 PROCEDURE — 63600175 PHARM REV CODE 636 W HCPCS: Performed by: STUDENT IN AN ORGANIZED HEALTH CARE EDUCATION/TRAINING PROGRAM

## 2025-01-14 PROCEDURE — 96374 THER/PROPH/DIAG INJ IV PUSH: CPT

## 2025-01-14 RX ADMIN — IRON SUCROSE 200 MG: 20 INJECTION, SOLUTION INTRAVENOUS at 01:01

## 2025-01-14 NOTE — PROGRESS NOTES
[facility]   Physical Therapy Treatment Note     Name: Katelin Head  Clinic Number: [id number]    Therapy Diagnosis:   Encounter Diagnoses   Name Primary?    Weakness Yes    Decreased functional activity tolerance     Hip stiffness, right      Physician: Moisés Shi MD    Visit Date: 1/14/2025    Physician Orders: PT Eval and Treat   Medical Diagnosis from Referral: M25.551 (ICD-10-CM) - Right hip pain   Evaluation Date: 12/17/2024  Authorization Period Expiration: 12/31/2025  Plan of Care Expiration: 3/17/2025  Visit # / Visits authorized: 2/ 20 Progress Note Due: 2/14/2025  FOTO: 1/ 1     Precautions: Standard    Visit #: 1 of 20  PTA Visit #: 3/5  Time In: 9:00 am  Time Out: 9:55 am  Total Billable Time: 55 minutes    Subjective     Pt reports: feels 75% normal and has less pain, but still has some days of pain but they are fewer. Pt says 10k step days are irritating her less often now and is able to stay busy more.   She  n/a  compliant with home exercise program.  Response to previous treatment: improving  Functional change: ongoing    Pain: 1/10  Location: Right lateral/anterior thigh      Objective      SLS: R pain, L normal      Squat: painful knees, 75% depth, mild genu valgus      GAIT DEVIATIONS: Katelin displays mild B trendelenburg      Hip Range of Motion: WFL        Lower Extremity Strength  Right LE   Left LE     Knee extension: 5/5 Knee extension: 5/5   Knee flexion: 5/5 Knee flexion: 5/5   Hip flexion: 5/5 Hip flexion: 5/5   Hip Internal Rotation:  5/5    Hip Internal Rotation: 5/5      Hip External Rotation: 4+/5    Hip External Rotation: 4+/5      Hip extension:  4+/5 Hip extension: 4+/5   Hip abduction: 4-/5 Hip abduction: 4-/5   Hip adduction: NT Hip adduction: NT   Ankle dorsiflexion: 5/5 Ankle dorsiflexion: 5/5   Ankle plantarflexion: 5/5 Ankle plantarflexion: 5/5      Special tests:  DAVONTE: positive hip tightness but no pain  Noble's compression: R positive    "  Flexibility:               Arin's test: R negative     Katelin participated in dynamic functional therapeutic activities to improve functional performance for 30 minutes, including:  Time for reassessment   Nu-step 10 min level 3  +Hip extension shuttle (donkey kick) 1 band 2 x10  +Shuttle DL 2 Black bands 3x10  +Sit to Stand from 20" box holding 15# KB 2x10  +Matrix Hip Abd 20# 3x10 (not done due to time )  +Matrix Hip Add 20# 3x10 (not done due to time)    Katelin received therapeutic exercises to develop strength, endurance, ROM, flexibility, posture, and core stabilization for 00 minutes including:     Long Sitting Hamstring stretch 3x30" RLE  Supine Hip fall out stretch 5x10" RLE  SL IT band stretch 4x30" lying on Left side  SL QL stretch 4x30" lying on Left side  Supine Hip Flexors + Quad stretch 4x30"  Prone quad stretch 4x30" RLE (not done due to time )    Katelin participated in neuromuscular re-education activities to improve: Balance, Coordination, Proprioception, Posture, and muscle control for 23 minutes. The following activities were included:  +side steps GTB @knees x 2 laps   +monster walk GTB @ knees x 1 lap   Hooklying Bridges with RTB around knees 3x10x3"  Supine SLR with 2# 3x10 BLE  SL Hip Abduction 2x10 BLE  Prone Hip Extension 2x10 BLE    Katelin received the following manual therapy techniques: Myofacial release and Soft tissue Mobilization were applied to the: Right hip/IT band/Quad for 0 minutes, including:    Myofascial release TFL  IASTM roller on lateral quad/ thigh       Home Exercises Provided and Patient Education Provided     Written Home Exercises Provided: Patient instructed to cont prior HEP.  Exercises were reviewed and Katelin was able to demonstrate them prior to the end of the session.  Katelin demonstrated good  understanding of the education provided.     Education provided:    HEPs      Assessment     Pt reassessed today and she feels 75% normal. Pt has improved in knee strength and " has mild improvement in hip strength from MMT. More hip specific exercises were implemented today to emphasize glute max and glute medius via side steps, shuttle donkey kicks, and banded shuttle presses. Pt did not have any adverse effects and skilled PT is stil required to bring pt to PLOF and improve remaining deficits.     Katelin Is progressing well towards her goals.   Pt prognosis is Good.     Pt will continue to benefit from skilled outpatient physical therapy to address the deficits listed in the problem list box on initial evaluation, provide pt/family education and to maximize pt's level of independence in the home and community environment.     Pt's spiritual, cultural and educational needs considered and pt agreeable to plan of care and goals.     Anticipated barriers to physical therapy: none    Goals:   Short Term Goals:  2-3 weeks  1.Report decreased in pain at worse less than <   / =  4  /10  to increase tolerance for functional activities. MET  2. Increased B Hip  MMT 1/2  to increase tolerance for ADL and work activities. Partially MET  3. Pt to tolerate HEP to improve ROM and independence with ADL's. MET     Long Term Goals:  6-8 weeks  1.Report decreased in pain at worse less than  <   / =  0  /10  to increase tolerance for functional mobility. On going  2.Increased B hip MMT 1 grade  to increase tolerance for ADL and work activities.On going  3. Pt will scores report 5% limitation on FOTO hip survey  to demonstrate increase in LE function with every day tasks. On going  4. Pt to be Independent with HEP to improve ROM and independence with ADL's. On going  5. Pt will be able to walk on uneven surfaces for 1 hr with no difficulty in order to show improved tolerance for cutting grass. Ongoing     Plan     Plan of care Certification: 12/17/2024 to 3/17/2025.     Outpatient Physical Therapy 2 times weekly for 8 weeks to include the following interventions: Gait Training, Manual Therapy, Moist Heat/  Ice, Neuromuscular Re-ed, Patient Education, Self Care, Therapeutic Activities, and Therapeutic Exercise. Evelina Woods Jr, PT   1/14/2025

## 2025-01-15 NOTE — PLAN OF CARE
AA&Ox4. Pt voiced she feels more energized since receiving Venofer medication, and denies ill effects from previous infusions  Tolerated today's IVP Venofer well with no side effects or adverse rx's noted. Pt uses portal for appt schedule.

## 2025-01-28 ENCOUNTER — CLINICAL SUPPORT (OUTPATIENT)
Dept: REHABILITATION | Facility: HOSPITAL | Age: 40
End: 2025-01-28
Payer: COMMERCIAL

## 2025-01-28 DIAGNOSIS — M25.651 HIP STIFFNESS, RIGHT: ICD-10-CM

## 2025-01-28 DIAGNOSIS — R53.1 WEAKNESS: Primary | ICD-10-CM

## 2025-01-28 DIAGNOSIS — R68.89 DECREASED FUNCTIONAL ACTIVITY TOLERANCE: ICD-10-CM

## 2025-01-28 PROCEDURE — 97530 THERAPEUTIC ACTIVITIES: CPT | Mod: PN

## 2025-01-28 PROCEDURE — 97112 NEUROMUSCULAR REEDUCATION: CPT | Mod: PN

## 2025-01-28 PROCEDURE — 97110 THERAPEUTIC EXERCISES: CPT | Mod: PN

## 2025-01-28 PROCEDURE — 97140 MANUAL THERAPY 1/> REGIONS: CPT | Mod: PN

## 2025-01-28 NOTE — PROGRESS NOTES
[facility]   Physical Therapy Treatment Note     Name: Katelin Head  Clinic Number: [id number]    Therapy Diagnosis:   Encounter Diagnoses   Name Primary?    Weakness Yes    Decreased functional activity tolerance     Hip stiffness, right      Physician: Moisés Shi MD    Visit Date: 1/28/2025    Physician Orders: PT Eval and Treat   Medical Diagnosis from Referral: M25.551 (ICD-10-CM) - Right hip pain   Evaluation Date: 12/17/2024  Authorization Period Expiration: 12/31/2025  Plan of Care Expiration: 3/17/2025  Visit # / Visits authorized: 3/ 20 Progress Note Due: 2/14/2025  FOTO: 1/ 1     Precautions: Standard    Visit #: 1 of 20  PTA Visit #: 3/5  Time In: 9:00 am  Time Out: 9:55 am  Total Billable Time: 53 minutes    Subjective     Pt reports: no changes since last visit and there is a little pain but it is in her L hip.    She  n/a  compliant with home exercise program.  Response to previous treatment: improving  Functional change: ongoing    Pain: 3-4/10  Location: Right lateral/anterior thigh      Objective      SLS: R pain, L normal      Squat: painful knees, 75% depth, mild genu valgus      GAIT DEVIATIONS: Katelin displays mild B trendelenburg      Hip Range of Motion: WFL        Lower Extremity Strength  Right LE   Left LE     Knee extension: 5/5 Knee extension: 5/5   Knee flexion: 5/5 Knee flexion: 5/5   Hip flexion: 5/5 Hip flexion: 5/5   Hip Internal Rotation:  5/5    Hip Internal Rotation: 5/5      Hip External Rotation: 4+/5    Hip External Rotation: 4+/5      Hip extension:  4+/5 Hip extension: 4+/5   Hip abduction: 4-/5 Hip abduction: 4-/5   Hip adduction: NT Hip adduction: NT   Ankle dorsiflexion: 5/5 Ankle dorsiflexion: 5/5   Ankle plantarflexion: 5/5 Ankle plantarflexion: 5/5      Special tests:  DAVONTE: positive hip tightness but no pain  Noble's compression: R positive     Flexibility:               Arin's test: R negative     Katelin participated in dynamic  "functional therapeutic activities to improve functional performance for 22 minutes, including:  Time for reassessment   Nu-step 10 min level 3  +Sled push 10# x 2 laps   +Hip extension shuttle (donkey kick) 1 band 2 x10  +Shuttle DL 2 Black bands 3x10  +Sit to Stand from 20" box holding 15# KB 2x10  +Matrix Hip Abd 20# 3x10 (not done due to time )  +Matrix Hip Add 20# 3x10     Katelin received therapeutic exercises to develop strength, endurance, ROM, flexibility, posture, and core stabilization for 8 minutes including:     Long Sitting Hamstring stretch 3x30" RLE  Supine Hip fall out stretch 5x10" RLE  SL IT band stretch 3x30" lying on B sides   SL QL stretch 4x30" lying on Left side  Supine Hip Flexors + Quad stretch 4x30"  Prone quad stretch 3x30" RLE     Katelin participated in neuromuscular re-education activities to improve: Balance, Coordination, Proprioception, Posture, and muscle control for 15 minutes. The following activities were included:  +side steps GTB @knees x 2 laps   +monster walk GTB @ knees x 1 lap   Hooklying Bridges with GTB around knees 3x10x3"    Performed in HEP:  Supine SLR with 2# 3x10 BLE  SL Hip Abduction 2x10 BLE  Prone Hip Extension 2x10 BLE    Katelin received the following manual therapy techniques: Myofacial release and Soft tissue Mobilization were applied to the: Right hip/IT band/Quad for 8 minutes, including:  PROM DAVONTE   Myofascial release TFL  IASTM roller on lateral quad/ thigh       Home Exercises Provided and Patient Education Provided     Written Home Exercises Provided: Patient instructed to cont prior HEP.  Exercises were reviewed and Katelin was able to demonstrate them prior to the end of the session.  Katelin demonstrated good  understanding of the education provided.     Education provided:    HEPs      Assessment     Pt tolerated all exercises and interventions without any adverse effects. Pt was less irritable though there were mild complaints of similar symptoms in her L " hip. 24 inch box squats added today and was final height due to 20 inch being too stressful on her knee. Also implemented Sled push with no issues. Will continue to progress as tolerated to PLOF.     Katelin Is progressing well towards her goals.   Pt prognosis is Good.     Pt will continue to benefit from skilled outpatient physical therapy to address the deficits listed in the problem list box on initial evaluation, provide pt/family education and to maximize pt's level of independence in the home and community environment.     Pt's spiritual, cultural and educational needs considered and pt agreeable to plan of care and goals.     Anticipated barriers to physical therapy: none    Goals:   Short Term Goals:  2-3 weeks  1.Report decreased in pain at worse less than <   / =  4  /10  to increase tolerance for functional activities. MET  2. Increased B Hip  MMT 1/2  to increase tolerance for ADL and work activities. Partially MET  3. Pt to tolerate HEP to improve ROM and independence with ADL's. MET     Long Term Goals:  6-8 weeks  1.Report decreased in pain at worse less than  <   / =  0  /10  to increase tolerance for functional mobility. On going  2.Increased B hip MMT 1 grade  to increase tolerance for ADL and work activities.On going  3. Pt will scores report 5% limitation on FOTO hip survey  to demonstrate increase in LE function with every day tasks. On going  4. Pt to be Independent with HEP to improve ROM and independence with ADL's. On going  5. Pt will be able to walk on uneven surfaces for 1 hr with no difficulty in order to show improved tolerance for cutting grass. Ongoing     Plan     Plan of care Certification: 12/17/2024 to 3/17/2025.     Outpatient Physical Therapy 2 times weekly for 8 weeks to include the following interventions: Gait Training, Manual Therapy, Moist Heat/ Ice, Neuromuscular Re-ed, Patient Education, Self Care, Therapeutic Activities, and Therapeutic Exercise. Dry needmaría elena MCNEAL  Peggy Conway, PT   1/28/2025

## 2025-02-03 ENCOUNTER — CLINICAL SUPPORT (OUTPATIENT)
Dept: REHABILITATION | Facility: HOSPITAL | Age: 40
End: 2025-02-03
Payer: COMMERCIAL

## 2025-02-03 DIAGNOSIS — M25.651 HIP STIFFNESS, RIGHT: ICD-10-CM

## 2025-02-03 DIAGNOSIS — R68.89 DECREASED FUNCTIONAL ACTIVITY TOLERANCE: ICD-10-CM

## 2025-02-03 DIAGNOSIS — R53.1 WEAKNESS: Primary | ICD-10-CM

## 2025-02-03 PROCEDURE — 97110 THERAPEUTIC EXERCISES: CPT | Mod: PN,CQ

## 2025-02-03 PROCEDURE — 97112 NEUROMUSCULAR REEDUCATION: CPT | Mod: PN,CQ

## 2025-02-03 PROCEDURE — 97530 THERAPEUTIC ACTIVITIES: CPT | Mod: PN,CQ

## 2025-02-03 NOTE — PROGRESS NOTES
"[facility]   Physical Therapy Treatment Note     Name: Katelin Head  Clinic Number: [id number]    Therapy Diagnosis:   Encounter Diagnoses   Name Primary?    Weakness Yes    Decreased functional activity tolerance     Hip stiffness, right      Physician: Moisés Shi MD    Visit Date: 2/3/2025    Physician Orders: PT Eval and Treat   Medical Diagnosis from Referral: M25.551 (ICD-10-CM) - Right hip pain   Evaluation Date: 12/17/2024  Authorization Period Expiration: 12/31/2025  Plan of Care Expiration: 3/17/2025  Visit # / Visits authorized: 4/ 20 Progress Note Due: 2/14/2025  FOTO: 1/ 1     Precautions: Standard    Visit #: 4 of 20  PTA Visit #: 1/5  Time In: 11:00am  Time Out: 11:53am   Total Billable Time: 53 minutes    Subjective     Pt reports: she doing good and feels like right hip is getting better.  She  n/a  compliant with home exercise program.  Response to previous treatment: improving  Functional change: ongoing    Pain: 0/10  Location: Right lateral/anterior thigh      Objective      Last re-assess on 1/14/25.     Katelin participated in dynamic functional therapeutic activities to improve functional performance for 23 minutes, including:  Nu-step 10 min level 3.5   Sled push 10# x 2 laps   Hip extension shuttle (donkey kick) 1 band 2 x10 bilateral   Shuttle DL 2 Black bands 3x10  Sit to Stand from 20" box holding 15# KB 2x10  Matrix Hip Abd 20# 3x10  Matrix Hip Add 20# 3x10     Katelin received therapeutic exercises to develop strength, endurance, ROM, flexibility, posture, and core stabilization for 15 minutes including:   Long Sitting Hamstring stretch 3x30" RLE  Supine Hip fall out stretch 5x10" RLE  SL IT band stretch 3x30" lying on B sides   SL QL stretch 4x30" lying on Left side  Supine Hip Flexors + Quad stretch 4x30"  Prone quad stretch 3x30" RLE with blue strap     Katelin participated in neuromuscular re-education activities to improve: Balance, Coordination, " "Proprioception, Posture, and muscle control for 15 minutes. The following activities were included:  side steps GTB @knees x 2 laps   monster walk GTB @ knees x 2 lap   Hooklying Bridges with GTB around knees 3x10x3"    Performed in HEP:  Supine SLR with 2# 3x10 BLE  SL Hip Abduction 2x10 BLE  Prone Hip Extension 2x10 BLE    Katelin received the following manual therapy techniques: Myofacial release and Soft tissue Mobilization were applied to the: Right hip/IT band/Quad for 00 minutes, including:  PROM DAVONTE   Myofascial release TFL  IASTM roller on lateral quad/ thigh       Home Exercises Provided and Patient Education Provided     Written Home Exercises Provided: Patient instructed to cont prior HEP.  Exercises were reviewed and Katelin was able to demonstrate them prior to the end of the session.  Katelin demonstrated good  understanding of the education provided.     Education provided:   - HEP      Assessment   Patient appears to display less to no pain to right hip since the care of therapy. Noted her hip mobility is improving as well. Minimal verbal cues provided in exercises only in supine mat stretches. Noted patient still demonstrates hip extensors and abductors weakness. Will continue to work on hip mobility and strengthening to improve her symptoms.     Katelin Is progressing well towards her goals.   Pt prognosis is Good.     Pt will continue to benefit from skilled outpatient physical therapy to address the deficits listed in the problem list box on initial evaluation, provide pt/family education and to maximize pt's level of independence in the home and community environment.     Pt's spiritual, cultural and educational needs considered and pt agreeable to plan of care and goals.     Anticipated barriers to physical therapy: none    Goals:   Short Term Goals:  2-3 weeks  1.Report decreased in pain at worse less than <   / =  4  /10  to increase tolerance for functional activities. MET  2. Increased B Hip  " MMT 1/2  to increase tolerance for ADL and work activities. Partially MET  3. Pt to tolerate HEP to improve ROM and independence with ADL's. MET     Long Term Goals:  6-8 weeks  1.Report decreased in pain at worse less than  <   / =  0  /10  to increase tolerance for functional mobility. On going  2.Increased B hip MMT 1 grade  to increase tolerance for ADL and work activities.On going  3. Pt will scores report 5% limitation on FOTO hip survey  to demonstrate increase in LE function with every day tasks. On going  4. Pt to be Independent with HEP to improve ROM and independence with ADL's. On going  5. Pt will be able to walk on uneven surfaces for 1 hr with no difficulty in order to show improved tolerance for cutting grass. Ongoing     Plan     Plan of care Certification: 12/17/2024 to 3/17/2025.     Outpatient Physical Therapy 2 times weekly for 8 weeks to include the following interventions: Gait Training, Manual Therapy, Moist Heat/ Ice, Neuromuscular Re-ed, Patient Education, Self Care, Therapeutic Activities, and Therapeutic Exercise. Evelina Lee Mai, PTA   2/3/2025

## 2025-02-13 ENCOUNTER — CLINICAL SUPPORT (OUTPATIENT)
Dept: REHABILITATION | Facility: HOSPITAL | Age: 40
End: 2025-02-13
Payer: COMMERCIAL

## 2025-02-13 DIAGNOSIS — R68.89 DECREASED FUNCTIONAL ACTIVITY TOLERANCE: ICD-10-CM

## 2025-02-13 DIAGNOSIS — R53.1 WEAKNESS: Primary | ICD-10-CM

## 2025-02-13 DIAGNOSIS — M25.651 HIP STIFFNESS, RIGHT: ICD-10-CM

## 2025-02-13 DIAGNOSIS — M25.551 RIGHT HIP PAIN: ICD-10-CM

## 2025-02-13 PROCEDURE — 97112 NEUROMUSCULAR REEDUCATION: CPT | Mod: PN,CQ

## 2025-02-13 PROCEDURE — 97530 THERAPEUTIC ACTIVITIES: CPT | Mod: PN,CQ

## 2025-02-13 PROCEDURE — 97110 THERAPEUTIC EXERCISES: CPT | Mod: PN,CQ

## 2025-02-13 NOTE — PROGRESS NOTES
"[facility]   Physical Therapy Treatment Note     Name: Katelin Head  Clinic Number: [id number]    Therapy Diagnosis:   Encounter Diagnoses   Name Primary?    Weakness Yes    Decreased functional activity tolerance     Hip stiffness, right     Right hip pain      Physician: Moisés Shi MD    Visit Date: 2/13/2025    Physician Orders: PT Eval and Treat   Medical Diagnosis from Referral: M25.551 (ICD-10-CM) - Right hip pain   Evaluation Date: 12/17/2024  Authorization Period Expiration: 12/31/2025  Plan of Care Expiration: 3/17/2025  Visit # / Visits authorized: 5/ 20 Progress Note Due: 2/14/2025 (RE-ASSESS NEXT VISIT)  FOTO: 1/ 1     Precautions: Standard    Visit #: 5 of 20  PTA Visit #: 2/5  Time In: 0900am   Time Out: 10:00am   Total Billable Time: 60 minutes    Subjective     Pt reports: she doing good and feels like right hip is getting better.  She  n/a  compliant with home exercise program.  Response to previous treatment: improving  Functional change: ongoing    Pain: 0/10  Location: Right lateral/anterior thigh      Objective      Last re-assess on 1/14/25.     Katelin participated in dynamic functional therapeutic activities to improve functional performance for 30 minutes, including:  Nu-step 10 min level 3.5   Sled push 10# x 2 laps   Hip extension shuttle (donkey kick) 1 band 2 x10 bilateral   Shuttle DL 2 Black bands 3x10  Sit to Stand from 20" box holding 15# KB 2x10  Matrix Hip Abd 20# 3x10  Matrix Hip Add 20# 3x10     Katelin received therapeutic exercises to develop strength, endurance, ROM, flexibility, posture, and core stabilization for 15 minutes including:   Long Sitting Hamstring stretch 3x30" RLE  Supine Hip fall out stretch 5x10" RLE  SL IT band stretch 3x30" lying on B sides   SL QL stretch 4x30" lying on Left side  Supine Hip Flexors + Quad stretch 4x30"  Prone quad stretch 3x30" RLE with blue strap     Katelin participated in neuromuscular re-education activities to " "improve: Balance, Coordination, Proprioception, Posture, and muscle control for 15 minutes. The following activities were included:  side steps GTB @knees x 2 laps   monster walk GTB @ knees x 2 lap   Hooklying Bridges with GTB around knees 3x10x3"    Performed in HEP:  Supine SLR with 2# 3x10 BLE  SL Hip Abduction 2x10 BLE  Prone Hip Extension 2x10 BLE    Katelin received the following manual therapy techniques: Myofacial release and Soft tissue Mobilization were applied to the: Right hip/IT band/Quad for 00 minutes, including:  PROM DAVONTE   Myofascial release TFL  IASTM roller on lateral quad/ thigh       Home Exercises Provided and Patient Education Provided     Written Home Exercises Provided: Patient instructed to cont prior HEP.  Exercises were reviewed and Katelin was able to demonstrate them prior to the end of the session.  Katelin demonstrated good  understanding of the education provided.     Education provided:   - HEP      Assessment   Patient appears to display less to no pain to right hip since the care of therapy. Noted her hip mobility is improving as well. Minimal verbal cues provided in exercises only in supine mat stretches. Noted patient still demonstrates hip extensors and abductors weakness. No new progression were added this visit. Patient will be due for a re-assess next visit to determine what goals are met and may consider to upgrade her exercises then. Overall, Katelin Is progressing well towards her goals.       Pt prognosis is Good.     Pt will continue to benefit from skilled outpatient physical therapy to address the deficits listed in the problem list box on initial evaluation, provide pt/family education and to maximize pt's level of independence in the home and community environment.     Pt's spiritual, cultural and educational needs considered and pt agreeable to plan of care and goals.     Anticipated barriers to physical therapy: none    Goals:   Short Term Goals:  2-3 weeks  1.Report " decreased in pain at worse less than <   / =  4  /10  to increase tolerance for functional activities. MET  2. Increased B Hip  MMT 1/2  to increase tolerance for ADL and work activities. Partially MET  3. Pt to tolerate HEP to improve ROM and independence with ADL's. MET     Long Term Goals:  6-8 weeks  1.Report decreased in pain at worse less than  <   / =  0  /10  to increase tolerance for functional mobility. On going  2.Increased B hip MMT 1 grade  to increase tolerance for ADL and work activities.On going  3. Pt will scores report 5% limitation on FOTO hip survey  to demonstrate increase in LE function with every day tasks. On going  4. Pt to be Independent with HEP to improve ROM and independence with ADL's. On going  5. Pt will be able to walk on uneven surfaces for 1 hr with no difficulty in order to show improved tolerance for cutting grass. Ongoing     Plan     Plan of care Certification: 12/17/2024 to 3/17/2025.     Outpatient Physical Therapy 2 times weekly for 8 weeks to include the following interventions: Gait Training, Manual Therapy, Moist Heat/ Ice, Neuromuscular Re-ed, Patient Education, Self Care, Therapeutic Activities, and Therapeutic Exercise. Evelina Lee Mai, PTA   2/13/2025

## 2025-02-18 ENCOUNTER — CLINICAL SUPPORT (OUTPATIENT)
Dept: REHABILITATION | Facility: HOSPITAL | Age: 40
End: 2025-02-18
Payer: COMMERCIAL

## 2025-02-18 DIAGNOSIS — R68.89 DECREASED FUNCTIONAL ACTIVITY TOLERANCE: ICD-10-CM

## 2025-02-18 DIAGNOSIS — M25.651 HIP STIFFNESS, RIGHT: ICD-10-CM

## 2025-02-18 DIAGNOSIS — R53.1 WEAKNESS: Primary | ICD-10-CM

## 2025-02-18 PROCEDURE — 97530 THERAPEUTIC ACTIVITIES: CPT | Mod: PN

## 2025-02-18 PROCEDURE — 97112 NEUROMUSCULAR REEDUCATION: CPT | Mod: PN

## 2025-02-18 PROCEDURE — 97140 MANUAL THERAPY 1/> REGIONS: CPT | Mod: PN

## 2025-02-18 NOTE — PROGRESS NOTES
[facility]   Physical Therapy Treatment Note     Name: Katelin Head  Clinic Number: [id number]    Therapy Diagnosis:   Encounter Diagnoses   Name Primary?    Weakness Yes    Decreased functional activity tolerance     Hip stiffness, right      Physician: Moisés Shi MD    Visit Date: 2/18/2025    Physician Orders: PT Eval and Treat   Medical Diagnosis from Referral: M25.551 (ICD-10-CM) - Right hip pain   Evaluation Date: 12/17/2024  Authorization Period Expiration: 12/31/2025  Plan of Care Expiration: 3/17/2025  Visit # / Visits authorized: 6/ 20 Progress Note Due: 3/18/2025   FOTO: 1/ 1     Precautions: Standard    Visit #: 5 of 20  PTA Visit #: 2/5  Time In: 8:50am   Time Out: 10:00am   Total Billable Time: 53 minutes    Subjective     Pt reports: the hip is doing better but has been experiencing B knee pain. R knee pain laterally and L knee is all over. Pt says it happened over the weekend but unable to oinpoint cause of pain. Pt feels she is 80% in her hip. Pt states she has returned to cutting grass and there is less pain than before. Says strenuous activities cause pain but a less severe level and recovery phase is quicker. Pt wants to continue therapy and the way we have been doing.   She  n/a  compliant with home exercise program.  Response to previous treatment: improving  Functional change: ongoing    Pain: 0/10  Location: Right lateral/anterior thigh      Objective    Squat: painful knees, 75% depth, mild genu R valgus , knee pain         Lower Extremity Strength           Right LE   Left LE      Knee extension: 5/5 Knee extension: 5/5    Knee flexion: 5/5 Knee flexion: 5/5    Hip flexion: 5/5 Hip flexion: 5/5    Hip Internal Rotation:  5/5    Hip Internal Rotation: 5/5      Hip External Rotation: 5/5    Hip External Rotation: 4+/5      Hip extension:  4+/5 Hip extension: 4+/5    Hip abduction: 4+/5 Hip abduction: 4+/5    Hip adduction: NT Hip adduction: NT    Ankle  "dorsiflexion: 5/5 Ankle dorsiflexion: 5/5    Ankle plantarflexion: 5/5 Ankle plantarflexion: 5/5      Last re-assess on 1/14/25.     Katelin participated in dynamic functional therapeutic activities to improve functional performance for 37 minutes, including:  Time for Reassessment  Upright Bike 10 min   Nu-step 10 min level 3.5   Sled push 10# x 3 laps   Hip extension shuttle (donkey kick) 1 band 2 x10 bilateral   Shuttle SL 2 Black bands 3x8 each side   Sit to Stand from 18" + 2 pads 2 x10  (no weight today , working  on squat mechanics and pain free squat  15# KB)  +Matrix B Knee Ext 15# 3 x 10  (6/10 pain last set in the knee;  Matrix Hip Abd 20# 3x10  Matrix Hip Add 20# 3x10     Katelin received therapeutic exercises to develop strength, endurance, ROM, flexibility, posture, and core stabilization for 00 minutes including:   Long Sitting Hamstring stretch 3x30" RLE  Supine Hip fall out stretch 5x10" RLE  SL IT band stretch 3x30" lying on B sides   SL QL stretch 4x30" lying on Left side  Supine Hip Flexors + Quad stretch 4x30"  Prone quad stretch 3x30" RLE with blue strap     Katelin participated in neuromuscular re-education activities to improve: Balance, Coordination, Proprioception, Posture, and muscle control for 8 minutes. The following activities were included:  side steps GTB @ankles x 2 laps   monster walk GTB @ ankles x 2 lap   +Glute burners       Hooklying Bridges with GTB around knees 3x10x3"    Performed in HEP:  Supine SLR with 2# 3x10 BLE  SL Hip Abduction 2x10 BLE  Prone Hip Extension 2x10 BLE    Katelin received the following manual therapy techniques: Myofacial release and Soft tissue Mobilization were applied to the: Right hip/IT band/Quad for 8 minutes, including:  PROM DAVONTE   Myofascial release TFL  IASTM roller on lateral quad/ thigh       Home Exercises Provided and Patient Education Provided     Written Home Exercises Provided: Patient instructed to cont prior HEP.  Exercises were reviewed and " Katelin was able to demonstrate them prior to the end of the session.  Katelin demonstrated good  understanding of the education provided.     Education provided:   - HEP      Assessment   Pt reassessed today and feels 80% normal. Bouts of severe pain aren't present and recovery phase after strenuous exercise has decreased. Pt has developed some knee pain so will keep an eye on that as she goes through therapy. Pt educated on use of foam roll for lateral thigh pain with return demonstration. PT is still appropriate at this time to reach PLOF.    Overall, Katelin Is progressing well towards her goals.       Pt prognosis is Good.     Pt will continue to benefit from skilled outpatient physical therapy to address the deficits listed in the problem list box on initial evaluation, provide pt/family education and to maximize pt's level of independence in the home and community environment.     Pt's spiritual, cultural and educational needs considered and pt agreeable to plan of care and goals.     Anticipated barriers to physical therapy: none    Goals:   Short Term Goals:  2-3 weeks  1.Report decreased in pain at worse less than <   / =  4  /10  to increase tolerance for functional activities. MET  2. Increased B Hip  MMT 1/2  to increase tolerance for ADL and work activities. Partially MET  3. Pt to tolerate HEP to improve ROM and independence with ADL's. MET     Long Term Goals:  6-8 weeks  1.Report decreased in pain at worse less than  <   / =  0  /10  to increase tolerance for functional mobility. On going  2.Increased B hip MMT 1 grade  to increase tolerance for ADL and work activities.On going  3. Pt will scores report 5% limitation on FOTO hip survey  to demonstrate increase in LE function with every day tasks. On going  4. Pt to be Independent with HEP to improve ROM and independence with ADL's. On going  5. Pt will be able to walk on uneven surfaces for 1 hr with no difficulty in order to show improved tolerance for  cutting grass. Ongoing     Plan     Plan of care Certification: 12/17/2024 to 3/17/2025.     Outpatient Physical Therapy 2 times weekly for 8 weeks to include the following interventions: Gait Training, Manual Therapy, Moist Heat/ Ice, Neuromuscular Re-ed, Patient Education, Self Care, Therapeutic Activities, and Therapeutic Exercise. Evelina Woods Jr, PT   2/18/2025

## 2025-02-25 ENCOUNTER — CLINICAL SUPPORT (OUTPATIENT)
Dept: REHABILITATION | Facility: HOSPITAL | Age: 40
End: 2025-02-25
Payer: COMMERCIAL

## 2025-02-25 DIAGNOSIS — R68.89 DECREASED FUNCTIONAL ACTIVITY TOLERANCE: ICD-10-CM

## 2025-02-25 DIAGNOSIS — R53.1 WEAKNESS: Primary | ICD-10-CM

## 2025-02-25 DIAGNOSIS — M25.651 HIP STIFFNESS, RIGHT: ICD-10-CM

## 2025-02-25 PROCEDURE — 97110 THERAPEUTIC EXERCISES: CPT | Mod: PN

## 2025-02-25 PROCEDURE — 97112 NEUROMUSCULAR REEDUCATION: CPT | Mod: PN

## 2025-02-25 PROCEDURE — 97530 THERAPEUTIC ACTIVITIES: CPT | Mod: PN

## 2025-02-25 NOTE — PROGRESS NOTES
"[facility]   Physical Therapy Treatment Note     Name: Katelin Head  Clinic Number: [id number]    Therapy Diagnosis:   Encounter Diagnoses   Name Primary?    Weakness Yes    Decreased functional activity tolerance     Hip stiffness, right        Physician: Moisés Shi MD    Visit Date: 2/25/2025    Physician Orders: PT Eval and Treat   Medical Diagnosis from Referral: M25.551 (ICD-10-CM) - Right hip pain   Evaluation Date: 12/17/2024  Authorization Period Expiration: 12/31/2025  Plan of Care Expiration: 3/17/2025  Visit # / Visits authorized: 7/ 20 Progress Note Due: 3/18/2025   FOTO: 1/ 1     Precautions: Standard    Visit #: 7 of 20  PTA Visit #: 2/5  Time In: 9:00am   Time Out: 10:00am   Total Billable Time: 55 minutes    Subjective     Pt reports: the hip is doing well and she is overcoming illness over the weekend.    She  n/a  compliant with home exercise program.  Response to previous treatment: improving  Functional change: ongoing    Pain: 0/10  Location: Right lateral/anterior thigh      Objective        Lower Extremity Strength           Right LE   Left LE      Knee extension: 5/5 Knee extension: 5/5    Knee flexion: 5/5 Knee flexion: 5/5    Hip flexion: 5/5 Hip flexion: 5/5    Hip Internal Rotation:  5/5    Hip Internal Rotation: 5/5      Hip External Rotation: 5/5    Hip External Rotation: 4+/5      Hip extension:  4+/5 Hip extension: 4+/5    Hip abduction: 4+/5 Hip abduction: 4+/5    Hip adduction: NT Hip adduction: NT    Ankle dorsiflexion: 5/5 Ankle dorsiflexion: 5/5    Ankle plantarflexion: 5/5 Ankle plantarflexion: 5/5          Katelin participated in dynamic functional therapeutic activities to improve functional performance for 30 minutes, including:  Time for Reassessment  Upright Bike 10 min    Sled push 10# x 3 laps   Hip extension shuttle (donkey kick) 1.5 bands 2 x10 bilateral   Sit to Stand from 24" 3 x10 (tapping butt) no weight, building squat mechanics " "  +Matrix B Knee Ext 10# 3 x 10      Nu-step 10 min level 3.5  Shuttle SL 2 Black bands 3x8 each side   Matrix Hip Abd 20# 3x10  Matrix Hip Add 20# 3x10     Katelin received therapeutic exercises to develop strength, endurance, ROM, flexibility, posture, and core stabilization for 10 minutes including:   Long Sitting Hamstring stretch 3x30" RLE  Supine Hip fall out stretch 5x10" RLE  SL IT band stretch 3x30" lying on B sides   SL QL stretch 4x30" lying on Left side  Supine Hip Flexors + Quad stretch 4x30"  Prone quad stretch 3x30" RLE with blue strap     Katelin participated in neuromuscular re-education activities to improve: Balance, Coordination, Proprioception, Posture, and muscle control for 15 minutes. The following activities were included:  side steps GTB @ankles x 2 laps   monster walk GTB @ ankles x 2 lap   +Glute burners 2 x 8 (abd, posteriolateral, ext)      Hooklying Bridges with GTB around knees 3x10x3"    Performed in HEP:  Supine SLR with 2# 3x10 BLE  SL Hip Abduction 2x10 BLE  Prone Hip Extension 2x10 BLE    Katelin received the following manual therapy techniques: Myofacial release and Soft tissue Mobilization were applied to the: Right hip/IT band/Quad for 00 minutes, including:  PROM DAVONTE   Myofascial release TFL  IASTM roller on lateral quad/ thigh       Home Exercises Provided and Patient Education Provided     Written Home Exercises Provided: Patient instructed to cont prior HEP.  Exercises were reviewed and Katelin was able to demonstrate them prior to the end of the session.  Katelin demonstrated good  understanding of the education provided.     Education provided:   - HEP      Assessment   Pt tolerated therapy well without any adverse effects. She continues to display low irritability. Added glute burners today in order to continue with hip stabilization. There was no increase in pain from progression. Will continue as tolerated to PLOF.     Overall, Katelin Is progressing well towards her goals. "       Pt prognosis is Good.     Pt will continue to benefit from skilled outpatient physical therapy to address the deficits listed in the problem list box on initial evaluation, provide pt/family education and to maximize pt's level of independence in the home and community environment.     Pt's spiritual, cultural and educational needs considered and pt agreeable to plan of care and goals.     Anticipated barriers to physical therapy: none    Goals:   Short Term Goals:  2-3 weeks  1.Report decreased in pain at worse less than <   / =  4  /10  to increase tolerance for functional activities. MET  2. Increased B Hip  MMT 1/2  to increase tolerance for ADL and work activities. Partially MET  3. Pt to tolerate HEP to improve ROM and independence with ADL's. MET     Long Term Goals:  6-8 weeks  1.Report decreased in pain at worse less than  <   / =  0  /10  to increase tolerance for functional mobility. On going  2.Increased B hip MMT 1 grade  to increase tolerance for ADL and work activities.On going  3. Pt will scores report 5% limitation on FOTO hip survey  to demonstrate increase in LE function with every day tasks. On going  4. Pt to be Independent with HEP to improve ROM and independence with ADL's. On going  5. Pt will be able to walk on uneven surfaces for 1 hr with no difficulty in order to show improved tolerance for cutting grass. Ongoing     Plan     Plan of care Certification: 12/17/2024 to 3/17/2025.     Outpatient Physical Therapy 2 times weekly for 8 weeks to include the following interventions: Gait Training, Manual Therapy, Moist Heat/ Ice, Neuromuscular Re-ed, Patient Education, Self Care, Therapeutic Activities, and Therapeutic Exercise. Evelina Woods Jr, PT   2/25/2025

## 2025-03-11 ENCOUNTER — CLINICAL SUPPORT (OUTPATIENT)
Dept: REHABILITATION | Facility: HOSPITAL | Age: 40
End: 2025-03-11
Payer: COMMERCIAL

## 2025-03-11 DIAGNOSIS — M25.651 HIP STIFFNESS, RIGHT: ICD-10-CM

## 2025-03-11 DIAGNOSIS — R53.1 WEAKNESS: Primary | ICD-10-CM

## 2025-03-11 DIAGNOSIS — R68.89 DECREASED FUNCTIONAL ACTIVITY TOLERANCE: ICD-10-CM

## 2025-03-11 PROCEDURE — 97112 NEUROMUSCULAR REEDUCATION: CPT | Mod: PN

## 2025-03-11 PROCEDURE — 97530 THERAPEUTIC ACTIVITIES: CPT | Mod: PN

## 2025-03-11 NOTE — PROGRESS NOTES
[facility]   Physical Therapy Treatment Note     Name: Katelin Head  Clinic Number: [id number]    Therapy Diagnosis:   Encounter Diagnoses   Name Primary?    Weakness Yes    Decreased functional activity tolerance     Hip stiffness, right        Physician: Moisés Shi MD    Visit Date: 3/11/2025    Physician Orders: PT Eval and Treat   Medical Diagnosis from Referral: M25.551 (ICD-10-CM) - Right hip pain   Evaluation Date: 12/17/2024  Authorization Period Expiration: 12/31/2025  Plan of Care Expiration: 3/17/2025 Updated to 5/17/2025  Visit # / Visits authorized: 8/ 20 Progress Note Due: 4/11/2025   FOTO: 1/ 1     Precautions: Standard    Visit #: 7 of 20  PTA Visit #: 2/5  Time In: 9:00am   Time Out: 10:00am   Total Billable Time: 55 minutes    Subjective     Pt reports: increased L knee pain and thinks she overdid it yesterday after painting a shed. In addition, she says the yard is muddy so the uneven surfacing may have contributed to the pain. Hip pain temporarily came back but has resolved this morning. The hips have been good overall, despite a brief flare up yesterday. Pt feels 80% normal and less pain than before but has not completely resolved. Pt feels that therapy should be continued.    She  n/a  compliant with home exercise program.  Response to previous treatment: improving  Functional change: ongoing    Pain: 6/10  Location: Right lateral/anterior thigh , L knee     Objective        Lower Extremity Strength           Right LE   Left LE      Knee extension: 5/5 Knee extension: 5/5    Knee flexion: 5/5 Knee flexion: 5/5    Hip flexion: 5/5 Hip flexion: 5/5    Hip Internal Rotation:  5/5    Hip Internal Rotation: 5/5      Hip External Rotation: 5/5    Hip External Rotation: 4+/5      Hip extension:  4+/5 Hip extension: 4+/5    Hip abduction: 4+/5 Hip abduction: 4+/5    Hip adduction: NT Hip adduction: NT    Ankle dorsiflexion: 5/5 Ankle dorsiflexion: 5/5    Ankle  "plantarflexion: 5/5 Ankle plantarflexion: 5/5          Katelin participated in dynamic functional therapeutic activities to improve functional performance for 45 minutes, including:  Time for Reassessment  Upright Bike 10 min    +TKE L knee 20 x 5" Red sports cord   Sled push 10# x 3 laps (no time)  Hip extension shuttle (donkey kick) 1.5 bands 2 x10 bilateral   Sit to Stand from 24" 3 x10 (tapping butt) no weight, building squat mechanics (no time)  +Matrix B Knee Ext 10# 3 x 10    Shuttle SL 2 Black bands 3x8 each side    Nu-step 10 min level 3.5     Matrix Hip Abd 20# 3x10  Matrix Hip Add 20# 3x10     Katelin received therapeutic exercises to develop strength, endurance, ROM, flexibility, posture, and core stabilization for 00 minutes including:   Long Sitting Hamstring stretch 3x30" RLE  Supine Hip fall out stretch 5x10" RLE  SL IT band stretch 3x30" lying on B sides   SL QL stretch 4x30" lying on Left side  Supine Hip Flexors + Quad stretch 4x30"  Prone quad stretch 3x30" RLE with blue strap     Katelin participated in neuromuscular re-education activities to improve: Balance, Coordination, Proprioception, Posture, and muscle control for 10 minutes. The following activities were included:  side steps GTB @ankles x 2 laps   monster walk GTB @ ankles x 2 lap   +Glute burners 2 x 8 (abd, posteriolateral, ext)      Hooklying Bridges with GTB around knees 3x10x3"    Performed in HEP:  Supine SLR with 2# 3x10 BLE  SL Hip Abduction 2x10 BLE  Prone Hip Extension 2x10 BLE    Katelin received the following manual therapy techniques: Myofacial release and Soft tissue Mobilization were applied to the: Right hip/IT band/Quad for 00 minutes, including:  PROM DAVONET   Myofascial release TFL  IASTM roller on lateral quad/ thigh       Home Exercises Provided and Patient Education Provided     Written Home Exercises Provided: Patient instructed to cont prior HEP.  Exercises were reviewed and Katelin was able to demonstrate them prior to " the end of the session.  Katelin demonstrated good  understanding of the education provided.     Education provided:   - HEP      Assessment   Pt reassessed today. She presented with increased L knee pain though hips weren't irritable. Pt also displayed antalgic gait. She feels 80% normal and would like to continue therapy due to pain with increased activities at times. Skilled PT is still required at this time due to tolerance deficits that come with increased intensity of activities such as housework and yard work. B hip strength has improved but more improvement is needed.  Will continue as tolerated to PLOF.     Overall, Katelin Is progressing well towards her goals.       Pt prognosis is Good.     Pt will continue to benefit from skilled outpatient physical therapy to address the deficits listed in the problem list box on initial evaluation, provide pt/family education and to maximize pt's level of independence in the home and community environment.     Pt's spiritual, cultural and educational needs considered and pt agreeable to plan of care and goals.     Anticipated barriers to physical therapy: none    Goals:   Short Term Goals:  2-3 weeks  1.Report decreased in pain at worse less than <   / =  4  /10  to increase tolerance for functional activities. MET  2. Increased B Hip  MMT 1/2  to increase tolerance for ADL and work activities. Partially MET  3. Pt to tolerate HEP to improve ROM and independence with ADL's. MET     Long Term Goals:  6-8 weeks  1.Report decreased in pain at worse less than  <   / =  0  /10  to increase tolerance for functional mobility. On going  2.Increased B hip MMT 1 grade  to increase tolerance for ADL and work activities.On going  3. Pt will scores report 5% limitation on FOTO hip survey  to demonstrate increase in LE function with every day tasks. On going  4. Pt to be Independent with HEP to improve ROM and independence with ADL's. On going  5. Pt will be able to walk on uneven  surfaces for 1 hr with no difficulty in order to show improved tolerance for cutting grass. Ongoing     Plan     Plan of care Certification: 12/17/2024 to 3/17/2025.  UPDATED to 5/17/2025     Outpatient Physical Therapy 2 times weekly for 8 weeks to include the following interventions: Gait Training, Manual Therapy, Moist Heat/ Ice, Neuromuscular Re-ed, Patient Education, Self Care, Therapeutic Activities, and Therapeutic Exercise. Dry needling    Ross Woods Jr, PT   3/11/2025

## 2025-03-13 ENCOUNTER — CLINICAL SUPPORT (OUTPATIENT)
Dept: REHABILITATION | Facility: HOSPITAL | Age: 40
End: 2025-03-13
Payer: COMMERCIAL

## 2025-03-13 DIAGNOSIS — R53.1 WEAKNESS: Primary | ICD-10-CM

## 2025-03-13 DIAGNOSIS — R68.89 DECREASED FUNCTIONAL ACTIVITY TOLERANCE: ICD-10-CM

## 2025-03-13 DIAGNOSIS — M25.651 HIP STIFFNESS, RIGHT: ICD-10-CM

## 2025-03-13 PROCEDURE — 97112 NEUROMUSCULAR REEDUCATION: CPT | Mod: PN,CQ

## 2025-03-13 PROCEDURE — 97530 THERAPEUTIC ACTIVITIES: CPT | Mod: PN,CQ

## 2025-03-13 NOTE — PROGRESS NOTES
"[facility]   Physical Therapy Treatment Note     Name: Katelin Head  Clinic Number: [id number]    Therapy Diagnosis:   Encounter Diagnoses   Name Primary?    Weakness Yes    Decreased functional activity tolerance     Hip stiffness, right        Physician: Moisés Shi MD    Visit Date: 3/13/2025    Physician Orders: PT Eval and Treat   Medical Diagnosis from Referral: M25.551 (ICD-10-CM) - Right hip pain   Evaluation Date: 12/17/2024  Authorization Period Expiration: 12/31/2025  Plan of Care Expiration: 3/17/2025 Updated to 5/17/2025  Visit # / Visits authorized: 9/16 Progress Note Due: 4/11/2025   FOTO: 1/ 1     Precautions: Standard    Visit #: 9 of 16  PTA Visit #: 1/5  Time In: 2:00 pm   Time Out: 3:00 pm   Total Billable Time: 60 minutes    Subjective     Pt reports: feeling good today, no pain.   She  n/a  compliant with home exercise program.  Response to previous treatment: improving  Functional change: ongoing    Pain: 0/10  Location: Right lateral/anterior thigh , L knee     Objective     Katelin participated in dynamic functional therapeutic activities to improve functional performance for 38 minutes, including:  Upright Bike 10 min level 2  TKE L knee 20 x 5" Red sports cord   Sled push 10# x 3 laps   Hip extension shuttle (donkey kick) 1.5 bands 2 x10 bilateral   Sit to Stand from 24" 3 x10 (tapping butt) no weight, building squat mechanics   Shuttle SL 2 Black bands 3x+10 each side  Matrix B Knee Ext 10# 3 x 10    Matrix Hip Abd 20# 3x10  Matrix Hip Add 20# 3x10     Katelin received therapeutic exercises to develop strength, endurance, ROM, flexibility, posture, and core stabilization for 12 minutes including:   Long Sitting Hamstring stretch 3x30" RLE  Supine Hip fall out stretch 5x10" RLE  SL IT band stretch 3x30" lying on B sides   SL QL stretch 4x30" lying on Left side  Supine Hip Flexors + Quad stretch 4x30"  Prone quad stretch 3x30" RLE with blue strap     Katelin " "participated in neuromuscular re-education activities to improve: Balance, Coordination, Proprioception, Posture, and muscle control for 9 minutes. The following activities were included:  Lateral steps GTB @ankles x 2 laps   Monster walk GTB @ ankles x 2 lap   Glute burners 2 x +10 (abd, posteriolateral, ext) (consider to add weight next visit)  Hooklying Bridges with GTB around knees 3x10x3"    Katelin received the following manual therapy techniques: Myofacial release and Soft tissue Mobilization were applied to the: Right hip/IT band/Quad for 00 minutes, including:  PROM DAVONTE   Myofascial release TFL  IASTM roller on lateral quad/ thigh       Home Exercises Provided and Patient Education Provided     Written Home Exercises Provided: Patient instructed to cont prior HEP.  Exercises were reviewed and Katelin was able to demonstrate them prior to the end of the session.  Katelin demonstrated good  understanding of the education provided.     Education provided:   - HEP      Assessment   Patient presented with no pain  today, reporting feeling good. Min cueing for proper form. Patient responded well to intervention without reporting increased in pain. She tolerated session well. Will continue as tolerated to PLOF.     Overall, Katelin Is progressing well towards her goals.       Pt prognosis is Good.     Pt will continue to benefit from skilled outpatient physical therapy to address the deficits listed in the problem list box on initial evaluation, provide pt/family education and to maximize pt's level of independence in the home and community environment.     Pt's spiritual, cultural and educational needs considered and pt agreeable to plan of care and goals.     Anticipated barriers to physical therapy: none    Goals:   Short Term Goals:  2-3 weeks  1.Report decreased in pain at worse less than <   / =  4  /10  to increase tolerance for functional activities. MET  2. Increased B Hip  MMT 1/2  to increase tolerance for " ADL and work activities. Partially MET  3. Pt to tolerate HEP to improve ROM and independence with ADL's. MET     Long Term Goals:  6-8 weeks  1.Report decreased in pain at worse less than  <   / =  0  /10  to increase tolerance for functional mobility. On going  2.Increased B hip MMT 1 grade  to increase tolerance for ADL and work activities.On going  3. Pt will scores report 5% limitation on FOTO hip survey  to demonstrate increase in LE function with every day tasks. On going  4. Pt to be Independent with HEP to improve ROM and independence with ADL's. On going  5. Pt will be able to walk on uneven surfaces for 1 hr with no difficulty in order to show improved tolerance for cutting grass. Ongoing     Plan     Plan of care Certification: 12/17/2024 to 3/17/2025.  UPDATED to 5/17/2025     Outpatient Physical Therapy 2 times weekly for 8 weeks to include the following interventions: Gait Training, Manual Therapy, Moist Heat/ Ice, Neuromuscular Re-ed, Patient Education, Self Care, Therapeutic Activities, and Therapeutic Exercise. Dry needling    Gómez To, PTA   3/13/2025

## 2025-03-17 ENCOUNTER — LAB VISIT (OUTPATIENT)
Dept: LAB | Facility: HOSPITAL | Age: 40
End: 2025-03-17
Attending: STUDENT IN AN ORGANIZED HEALTH CARE EDUCATION/TRAINING PROGRAM
Payer: COMMERCIAL

## 2025-03-17 ENCOUNTER — CLINICAL SUPPORT (OUTPATIENT)
Dept: REHABILITATION | Facility: HOSPITAL | Age: 40
End: 2025-03-17
Payer: COMMERCIAL

## 2025-03-17 DIAGNOSIS — R68.89 DECREASED FUNCTIONAL ACTIVITY TOLERANCE: ICD-10-CM

## 2025-03-17 DIAGNOSIS — R53.1 WEAKNESS: Primary | ICD-10-CM

## 2025-03-17 DIAGNOSIS — D50.0 IRON DEFICIENCY ANEMIA DUE TO CHRONIC BLOOD LOSS: ICD-10-CM

## 2025-03-17 DIAGNOSIS — M25.651 HIP STIFFNESS, RIGHT: ICD-10-CM

## 2025-03-17 LAB
BASOPHILS # BLD AUTO: 0.06 K/UL (ref 0–0.2)
BASOPHILS NFR BLD: 0.7 % (ref 0–1.9)
DIFFERENTIAL METHOD BLD: ABNORMAL
EOSINOPHIL # BLD AUTO: 0.4 K/UL (ref 0–0.5)
EOSINOPHIL NFR BLD: 4.6 % (ref 0–8)
ERYTHROCYTE [DISTWIDTH] IN BLOOD BY AUTOMATED COUNT: 16 % (ref 11.5–14.5)
FERRITIN SERPL-MCNC: 15 NG/ML (ref 20–300)
HCT VFR BLD AUTO: 44.8 % (ref 37–48.5)
HGB BLD-MCNC: 14.2 G/DL (ref 12–16)
IMM GRANULOCYTES # BLD AUTO: 0.02 K/UL (ref 0–0.04)
IMM GRANULOCYTES NFR BLD AUTO: 0.2 % (ref 0–0.5)
IRON SERPL-MCNC: 35 UG/DL (ref 30–160)
LYMPHOCYTES # BLD AUTO: 2.3 K/UL (ref 1–4.8)
LYMPHOCYTES NFR BLD: 26.8 % (ref 18–48)
MCH RBC QN AUTO: 25.8 PG (ref 27–31)
MCHC RBC AUTO-ENTMCNC: 31.7 G/DL (ref 32–36)
MCV RBC AUTO: 81 FL (ref 82–98)
MONOCYTES # BLD AUTO: 0.4 K/UL (ref 0.3–1)
MONOCYTES NFR BLD: 4.9 % (ref 4–15)
NEUTROPHILS # BLD AUTO: 5.4 K/UL (ref 1.8–7.7)
NEUTROPHILS NFR BLD: 62.8 % (ref 38–73)
NRBC BLD-RTO: 0 /100 WBC
PLATELET # BLD AUTO: 372 K/UL (ref 150–450)
PMV BLD AUTO: 8.7 FL (ref 9.2–12.9)
RBC # BLD AUTO: 5.51 M/UL (ref 4–5.4)
SATURATED IRON: 9 % (ref 20–50)
TOTAL IRON BINDING CAPACITY: 407 UG/DL (ref 250–450)
TRANSFERRIN SERPL-MCNC: 275 MG/DL (ref 200–375)
WBC # BLD AUTO: 8.56 K/UL (ref 3.9–12.7)

## 2025-03-17 PROCEDURE — 36415 COLL VENOUS BLD VENIPUNCTURE: CPT | Mod: PO | Performed by: STUDENT IN AN ORGANIZED HEALTH CARE EDUCATION/TRAINING PROGRAM

## 2025-03-17 PROCEDURE — 97530 THERAPEUTIC ACTIVITIES: CPT | Mod: PN,CQ

## 2025-03-17 PROCEDURE — 82728 ASSAY OF FERRITIN: CPT | Performed by: STUDENT IN AN ORGANIZED HEALTH CARE EDUCATION/TRAINING PROGRAM

## 2025-03-17 PROCEDURE — 97112 NEUROMUSCULAR REEDUCATION: CPT | Mod: PN,CQ

## 2025-03-17 PROCEDURE — 83540 ASSAY OF IRON: CPT | Performed by: STUDENT IN AN ORGANIZED HEALTH CARE EDUCATION/TRAINING PROGRAM

## 2025-03-17 PROCEDURE — 85025 COMPLETE CBC W/AUTO DIFF WBC: CPT | Performed by: STUDENT IN AN ORGANIZED HEALTH CARE EDUCATION/TRAINING PROGRAM

## 2025-03-17 NOTE — PROGRESS NOTES
"[facility]   Physical Therapy Treatment Note     Name: Katelin Head  Clinic Number: [id number]    Therapy Diagnosis:   Encounter Diagnoses   Name Primary?    Weakness Yes    Decreased functional activity tolerance     Hip stiffness, right        Physician: Moisés Shi MD    Visit Date: 3/17/2025    Physician Orders: PT Eval and Treat   Medical Diagnosis from Referral: M25.551 (ICD-10-CM) - Right hip pain   Evaluation Date: 12/17/2024  Authorization Period Expiration: 12/31/2025  Plan of Care Expiration: 3/17/2025 Updated to 5/17/2025  Visit # / Visits authorized: 10/16 Progress Note Due: 4/11/2025   FOTO: 1/ 1     Precautions: Standard    Visit #: 10 of 16  PTA Visit #: 2/5  Time In: 8:00 am  Time Out: 8:55 am   Total Billable Time: 55 minutes    Subjective     Pt reports: feeling good today, no pain. She says she walked a lot yesterday about >2 miles from and [place] with no pain.  She  n/a  compliant with home exercise program.  Response to previous treatment: improving  Functional change: ongoing    Pain: 0/10  Location: Right lateral/anterior thigh , L knee     Objective     Katelin participated in dynamic functional therapeutic activities to improve functional performance for 38 minutes, including:  Upright Bike 10 min level 2  TKE L knee 20 x 5" Red sports cord   Sled push 10# x 3 laps   Shuttle Hip extension shuttle (donkey kick) 1.5 bands 2 x10 bilateral   Shuttle SL 2 Black bands 3x10 each side  Sit to Stand from 20" 3 x10 (tapping butt) no weight, building squat mechanics   Matrix B Knee Ext 15# 3 x 10    Matrix Hip Abd 20# 3x10  Matrix Hip Add 20# 3x10     Katelin received therapeutic exercises to develop strength, endurance, ROM, flexibility, posture, and core stabilization for 00 minutes including:   Supine Hip fall out stretch 5x10" RLE  SL IT band stretch 3x30" lying on B sides   SL QL stretch 4x30" lying on Left side  Supine Hip Flexors + Quad stretch " "4x30"    Katelin participated in neuromuscular re-education activities to improve: Balance, Coordination, Proprioception, Posture, and muscle control for 17 minutes. The following activities were included:  +Hamstring sweeps 1 lap  +Quad pull 1 lap  Lateral steps GTB @ankles x 2 laps   Monster walk GTB @ ankles x 2 lap   Glute burners 2 x 10 (abd, posteriolateral, ext) +with 2# ankle weight   Hooklying Bridges with GTB around knees 3x10x3"    Katelin received the following manual therapy techniques: Myofacial release and Soft tissue Mobilization were applied to the: Right hip/IT band/Quad for 00 minutes, including:  PROM DAVONTE   Myofascial release TFL  IASTM roller on lateral quad/ thigh       Home Exercises Provided and Patient Education Provided     Written Home Exercises Provided: Patient instructed to cont prior HEP.  Exercises were reviewed and Katelin was able to demonstrate them prior to the end of the session.  Katelin demonstrated good  understanding of the education provided.     Education provided:   - HEP      Assessment   Patient presented with reporting feeling good today, no pain. She says she walked a lot yesterday about >2 miles from and [place] with no pain. Min cueing for proper form. Patient responded well to intervention without reporting increased in pain. She tolerated session well. Will continue as tolerated to PLOF.     Overall, Katelin Is progressing well towards her goals.       Pt prognosis is Good.     Pt will continue to benefit from skilled outpatient physical therapy to address the deficits listed in the problem list box on initial evaluation, provide pt/family education and to maximize pt's level of independence in the home and community environment.     Pt's spiritual, cultural and educational needs considered and pt agreeable to plan of care and goals.     Anticipated barriers to physical therapy: none    Goals:   Short Term Goals:  2-3 weeks  1.Report decreased in pain at " worse less than <   / =  4  /10  to increase tolerance for functional activities. MET  2. Increased B Hip  MMT 1/2  to increase tolerance for ADL and work activities. Partially MET  3. Pt to tolerate HEP to improve ROM and independence with ADL's. MET     Long Term Goals:  6-8 weeks  1.Report decreased in pain at worse less than  <   / =  0  /10  to increase tolerance for functional mobility. On going  2.Increased B hip MMT 1 grade  to increase tolerance for ADL and work activities.On going  3. Pt will scores report 5% limitation on FOTO hip survey  to demonstrate increase in LE function with every day tasks. On going  4. Pt to be Independent with HEP to improve ROM and independence with ADL's. On going  5. Pt will be able to walk on uneven surfaces for 1 hr with no difficulty in order to show improved tolerance for cutting grass. Ongoing     Plan     Plan of care Certification: 12/17/2024 to 3/17/2025.  UPDATED to 5/17/2025     Outpatient Physical Therapy 2 times weekly for 8 weeks to include the following interventions: Gait Training, Manual Therapy, Moist Heat/ Ice, Neuromuscular Re-ed, Patient Education, Self Care, Therapeutic Activities, and Therapeutic Exercise. Dry needling    Gómez To, PTA   3/17/2025

## 2025-03-18 ENCOUNTER — RESULTS FOLLOW-UP (OUTPATIENT)
Dept: HEMATOLOGY/ONCOLOGY | Facility: CLINIC | Age: 40
End: 2025-03-18

## 2025-03-19 ENCOUNTER — CLINICAL SUPPORT (OUTPATIENT)
Dept: REHABILITATION | Facility: HOSPITAL | Age: 40
End: 2025-03-19
Payer: COMMERCIAL

## 2025-03-19 DIAGNOSIS — R68.89 DECREASED FUNCTIONAL ACTIVITY TOLERANCE: ICD-10-CM

## 2025-03-19 DIAGNOSIS — R53.1 WEAKNESS: Primary | ICD-10-CM

## 2025-03-19 DIAGNOSIS — M25.651 HIP STIFFNESS, RIGHT: ICD-10-CM

## 2025-03-19 PROCEDURE — 97530 THERAPEUTIC ACTIVITIES: CPT | Mod: PN,CQ

## 2025-03-19 PROCEDURE — 97112 NEUROMUSCULAR REEDUCATION: CPT | Mod: PN,CQ

## 2025-03-19 NOTE — PROGRESS NOTES
"[facility]   Physical Therapy Treatment Note     Name: Katelin Head  Clinic Number: [id number]    Therapy Diagnosis:   Encounter Diagnoses   Name Primary?    Weakness Yes    Decreased functional activity tolerance     Hip stiffness, right        Physician: Moisés Shi MD    Visit Date: 3/19/2025    Physician Orders: PT Eval and Treat   Medical Diagnosis from Referral: M25.551 (ICD-10-CM) - Right hip pain   Evaluation Date: 12/17/2024  Authorization Period Expiration: 12/31/2025  Plan of Care Expiration: 3/17/2025 Updated to 5/17/2025  Visit # / Visits authorized: 11/25 Progress Note Due: 4/11/2025   FOTO: 1/ 1     Precautions: Standard    Visit #: 11 of 25  PTA Visit #: 2/5  Time In: 9:00 am  Time Out: 9:55 am   Total Billable Time: 55 minutes    Subjective     Pt reports: a little stiff today if she stands for a while. She says it maybe because of the way she stands but overall she feels a lot of improvement since therapy. She still doing her her stretching at home.  She  n/a  compliant with home exercise program.  Response to previous treatment: improving  Functional change: ongoing    Pain: 3/10  Location: Right hip    Objective     Katelin participated in dynamic functional therapeutic activities to improve functional performance for 38 minutes, including:  Upright Bike 10 min level 2  +Treadmill speed 2.1, 10 min (to improve BLE strength, endurance, and standing/walking tolerance)  TKE L knee 20 x 5" Red sports cord   Sled push +15# x 3 laps   Shuttle Hip extension shuttle (donkey kick) 1.5 bands 2 x10 bilateral   Shuttle SL 2 Black + 1 Red bands 3x10 each side  Sit to Stand from 20" 3 x10 (tapping butt) no weight, building squat mechanics holding +10# KB  +Walking Lunges with 2x10# KB hold on each hand 1 lap  Lateral steps GTB @ankles x 2 laps   Monster walk GTB @ ankles x 2 laps  +Reverse Monster walk GTB @ ankles x 1 lap  Matrix B Knee Ext 15# 3 x 10    Matrix Hip Abd 20# " "3x10  Matrix Hip Add 20# 3x10     Katelin participated in neuromuscular re-education activities to improve: Balance, Coordination, Proprioception, Posture, and muscle control for 17 minutes. The following activities were included:  Hamstring sweeps 1 lap  Quad pull 1 lap  Glute burners 2 x 10 (abd, posteriolateral, ext) +with 2# ankle weight   Hooklying Bridges DL with GTB around knees 10x  +Hooklying Bridges SL with GTB around knees 10x each leg    Katelin received therapeutic exercises to develop strength, endurance, ROM, flexibility, posture, and core stabilization for 00 minutes including:   Supine Hip fall out stretch 5x10" RLE  SL IT band stretch 3x30" lying on B sides   SL QL stretch 4x30" lying on Left side  Supine Hip Flexors + Quad stretch 4x30"    Katelin received the following manual therapy techniques: Myofacial release and Soft tissue Mobilization were applied to the: Right hip/IT band/Quad for 00 minutes, including:  PROM DAVONTE   Myofascial release TFL  IASTM roller on lateral quad/ thigh       Home Exercises Provided and Patient Education Provided     Written Home Exercises Provided: Patient instructed to cont prior HEP.  Exercises were reviewed and Katelin was able to demonstrate them prior to the end of the session.  Katelin demonstrated good  understanding of the education provided.     Education provided:   - HEP      Assessment   Patient presented with reporting a little stiff today if she stands for a while. She says it maybe because of the way she stands but overall she feels a lot of improvement since therapy. She still doing her her stretching at home. Cont with the same exercises from last visit, introducing Reverse Monster walk, Lunges walk, Bridges SL this visit, she tolerated well. Min cueing for proper form. Patient responded well to intervention without reporting increased in pain. She tolerated session well. Will continue as tolerated to PLOF.     Overall, Katelin Is progressing well towards her " goals.       Pt prognosis is Good.     Pt will continue to benefit from skilled outpatient physical therapy to address the deficits listed in the problem list box on initial evaluation, provide pt/family education and to maximize pt's level of independence in the home and community environment.     Pt's spiritual, cultural and educational needs considered and pt agreeable to plan of care and goals.     Anticipated barriers to physical therapy: none    Goals:   Short Term Goals:  2-3 weeks  1.Report decreased in pain at worse less than <   / =  4  /10  to increase tolerance for functional activities. MET  2. Increased B Hip  MMT 1/2  to increase tolerance for ADL and work activities. Partially MET  3. Pt to tolerate HEP to improve ROM and independence with ADL's. MET     Long Term Goals:  6-8 weeks  1.Report decreased in pain at worse less than  <   / =  0  /10  to increase tolerance for functional mobility. On going  2.Increased B hip MMT 1 grade  to increase tolerance for ADL and work activities.On going  3. Pt will scores report 5% limitation on FOTO hip survey  to demonstrate increase in LE function with every day tasks. On going  4. Pt to be Independent with HEP to improve ROM and independence with ADL's. On going  5. Pt will be able to walk on uneven surfaces for 1 hr with no difficulty in order to show improved tolerance for cutting grass. Ongoing     Plan     Plan of care Certification: 12/17/2024 to 3/17/2025.  UPDATED to 5/17/2025     Outpatient Physical Therapy 2 times weekly for 8 weeks to include the following interventions: Gait Training, Manual Therapy, Moist Heat/ Ice, Neuromuscular Re-ed, Patient Education, Self Care, Therapeutic Activities, and Therapeutic Exercise. Dry needling    Gómez To, PTA   3/19/2025

## 2025-03-24 ENCOUNTER — CLINICAL SUPPORT (OUTPATIENT)
Dept: REHABILITATION | Facility: HOSPITAL | Age: 40
End: 2025-03-24
Payer: COMMERCIAL

## 2025-03-24 DIAGNOSIS — R68.89 DECREASED FUNCTIONAL ACTIVITY TOLERANCE: ICD-10-CM

## 2025-03-24 DIAGNOSIS — M25.651 HIP STIFFNESS, RIGHT: ICD-10-CM

## 2025-03-24 DIAGNOSIS — R53.1 WEAKNESS: Primary | ICD-10-CM

## 2025-03-24 PROCEDURE — 97112 NEUROMUSCULAR REEDUCATION: CPT | Mod: PN,CQ

## 2025-03-24 PROCEDURE — 97530 THERAPEUTIC ACTIVITIES: CPT | Mod: PN,CQ

## 2025-03-24 NOTE — PROGRESS NOTES
"[facility]   Physical Therapy Treatment Note     Name: Katelin Head  Clinic Number: [id number]    Therapy Diagnosis:   Encounter Diagnoses   Name Primary?    Weakness Yes    Decreased functional activity tolerance     Hip stiffness, right        Physician: Moisés Shi MD    Visit Date: 3/24/2025    Physician Orders: PT Eval and Treat   Medical Diagnosis from Referral: M25.551 (ICD-10-CM) - Right hip pain   Evaluation Date: 12/17/2024  Authorization Period Expiration: 12/31/2025  Plan of Care Expiration: 3/17/2025 Updated to 5/17/2025  Visit # / Visits authorized: 12/25 Progress Note Due: 4/11/2025   FOTO: 1/ 1     Precautions: Standard    Visit #: 12 of 25  PTA Visit #: 4/5  Time In: 1:00 pm  Time Out: 1:55 pm   Total Billable Time: 55 minutes    Subjective     Pt reports: sore on the next day from last visit but feeling better today, no pain.  She  n/a  compliant with home exercise program.  Response to previous treatment: improving  Functional change: ongoing    Pain: 1/10  Location: Right hip    Objective     Katelin participated in dynamic functional therapeutic activities to improve functional performance for 38 minutes, including:  Upright Bike 10 min level 2  Treadmill speed 2.1, 10 min (to improve BLE strength, endurance, and standing/walking tolerance)  TKE L knee 20 x 5" Red sports cord   Sled push 15# x 3 laps   Shuttle Hip extension shuttle (donkey kick) 1.5 bands 2 x10 bilateral   Shuttle SL 2 Black + 1 Red bands 3x10 each side  Sit to Stand from 20" 3 x10 (tapping butt) no weight, building squat mechanics holding 10# KB  Walking Lunges with 2x10# KB hold on each hand 1 lap  Lateral steps GTB @ankles x 2 laps   Monster walk GTB @ ankles x 2 laps  Reverse Monster walk GTB @ ankles x 1 lap  Matrix B Knee Ext 15# 3 x 10    Matrix Hip Abd 20# 3x10  Matrix Hip Add 20# 3x10     Katelin participated in neuromuscular re-education activities to improve: Balance, Coordination, " "Proprioception, Posture, and muscle control for 17 minutes. The following activities were included:  Hamstring sweeps 1 lap  Quad pull 1 lap  Glute burners 2 x 10 (abd, posteriolateral, ext) +with 2# ankle weight   Hooklying Bridges DL with GTB around knees 10x  Hooklying Bridges SL with GTB around knees 10x each leg    Katelin received therapeutic exercises to develop strength, endurance, ROM, flexibility, posture, and core stabilization for 00 minutes including:   Supine Hip fall out stretch 5x10" RLE  SL IT band stretch 3x30" lying on B sides   SL QL stretch 4x30" lying on Left side  Supine Hip Flexors + Quad stretch 4x30"    Katelin received the following manual therapy techniques: Myofacial release and Soft tissue Mobilization were applied to the: Right hip/IT band/Quad for 00 minutes, including:  PROM DAVONTE   Myofascial release TFL  IASTM roller on lateral quad/ thigh       Home Exercises Provided and Patient Education Provided     Written Home Exercises Provided: Patient instructed to cont prior HEP.  Exercises were reviewed and Katelin was able to demonstrate them prior to the end of the session.  Katelin demonstrated good  understanding of the education provided.     Education provided:   - HEP      Assessment   Patient presented with reportingsore on the next day from last visit but feeling better today, no pain. No new intervention this visit, Cont with the same exercises from last visit, she tolerated well. Min cueing for proper form. Patient responded well to intervention without reporting increased in pain. She tolerated session well. Will continue as tolerated to PLOF.     Overall, Katelin Is progressing well towards her goals.       Pt prognosis is Good.     Pt will continue to benefit from skilled outpatient physical therapy to address the deficits listed in the problem list box on initial evaluation, provide pt/family education and to maximize pt's level of independence in the home and community environment. "     Pt's spiritual, cultural and educational needs considered and pt agreeable to plan of care and goals.     Anticipated barriers to physical therapy: none    Goals:   Short Term Goals:  2-3 weeks  1.Report decreased in pain at worse less than <   / =  4  /10  to increase tolerance for functional activities. MET  2. Increased B Hip  MMT 1/2  to increase tolerance for ADL and work activities. Partially MET  3. Pt to tolerate HEP to improve ROM and independence with ADL's. MET     Long Term Goals:  6-8 weeks  1.Report decreased in pain at worse less than  <   / =  0  /10  to increase tolerance for functional mobility. On going  2.Increased B hip MMT 1 grade  to increase tolerance for ADL and work activities.On going  3. Pt will scores report 5% limitation on FOTO hip survey  to demonstrate increase in LE function with every day tasks. On going  4. Pt to be Independent with HEP to improve ROM and independence with ADL's. On going  5. Pt will be able to walk on uneven surfaces for 1 hr with no difficulty in order to show improved tolerance for cutting grass. Ongoing     Plan     Plan of care Certification: 12/17/2024 to 3/17/2025.  UPDATED to 5/17/2025     Outpatient Physical Therapy 2 times weekly for 8 weeks to include the following interventions: Gait Training, Manual Therapy, Moist Heat/ Ice, Neuromuscular Re-ed, Patient Education, Self Care, Therapeutic Activities, and Therapeutic Exercise. Dry needling    Gómez To, PTA   3/24/2025

## 2025-04-01 ENCOUNTER — CLINICAL SUPPORT (OUTPATIENT)
Dept: REHABILITATION | Facility: HOSPITAL | Age: 40
End: 2025-04-01
Payer: COMMERCIAL

## 2025-04-01 ENCOUNTER — DOCUMENTATION ONLY (OUTPATIENT)
Dept: REHABILITATION | Facility: HOSPITAL | Age: 40
End: 2025-04-01
Payer: COMMERCIAL

## 2025-04-01 DIAGNOSIS — R68.89 DECREASED FUNCTIONAL ACTIVITY TOLERANCE: ICD-10-CM

## 2025-04-01 DIAGNOSIS — M25.651 HIP STIFFNESS, RIGHT: ICD-10-CM

## 2025-04-01 DIAGNOSIS — R53.1 WEAKNESS: Primary | ICD-10-CM

## 2025-04-01 PROCEDURE — 97112 NEUROMUSCULAR REEDUCATION: CPT | Mod: PN,CQ

## 2025-04-01 PROCEDURE — 97530 THERAPEUTIC ACTIVITIES: CPT | Mod: PN,CQ

## 2025-04-01 NOTE — PROGRESS NOTES
"[facility]   Physical Therapy Treatment Note     Name: Katelin Head  Clinic Number: [id number]    Therapy Diagnosis:   Encounter Diagnoses   Name Primary?    Weakness Yes    Decreased functional activity tolerance     Hip stiffness, right        Physician: Moisés Shi MD    Visit Date: 4/1/2025    Physician Orders: PT Eval and Treat   Medical Diagnosis from Referral: M25.551 (ICD-10-CM) - Right hip pain   Evaluation Date: 12/17/2024  Authorization Period Expiration: 12/31/2025  Plan of Care Expiration: 3/17/2025 Updated to 5/17/2025  Visit # / Visits authorized: 12/25 Progress Note Due: 4/11/2025   FOTO: 1/ 1     Precautions: Standard    Visit #: 13 of 25  PTA Visit #: 5/5  Time In: 10:00 am  Time Out: 10:55 am   Total Billable Time: 60 minutes    Subjective     Pt reports: hip feels ok today, noticed some pain to the lateral side of her Right knee when walking, sometimes it hits sometimes it doesn't..  She  n/a  compliant with home exercise program.  Response to previous treatment: improving  Functional change: ongoing    Pain: 1/10  Location: Right hip    Objective     Katelin participated in dynamic functional therapeutic activities to improve functional performance for 38 minutes, including:  Upright Bike 10 min level 2  Treadmill speed 2.1, 10 min (to improve BLE strength, endurance, and standing/walking tolerance)  TKE L knee 20 x 5" Red sports cord   Sled push 15# x 3 laps   Shuttle Hip extension shuttle (donkey kick) 1.5 bands 2 x10 bilateral   Shuttle SL 2 Black + 1 Red bands 3x10 each side  Sit to Stand from 20" 3 x10 (tapping butt) no weight, building squat mechanics holding 10# KB  Walking Lunges with 2x10# KB hold on each hand 1 lap  Lateral steps GTB @ankles x 2 laps   Monster walk GTB @ ankles x 2 laps  Reverse Monster walk GTB @ ankles x 1 lap  Matrix B Knee Ext 15# 3 x 10    Matrix Hip Abd 20# 3x10  Matrix Hip Add 20# 3x10   Time for Reassessment with PT    Katelin " "participated in neuromuscular re-education activities to improve: Balance, Coordination, Proprioception, Posture, and muscle control for 17 minutes. The following activities were included:  Hamstring sweeps 1 lap  Quad pull 1 lap  Glute burners 2 x 10 (abd, posteriolateral, ext) +with 2# ankle weight   Hooklying Bridges DL with GTB around knees 10x  Hooklying Bridges SL with GTB around knees 10x each leg  +SL Clamshell with GTB 3x10x3" RLE  +Hip IR with GTB 3x10x3" RLE  +Fire Hydrant GTB 3x10x3" RLE    Katelin received therapeutic exercises to develop strength, endurance, ROM, flexibility, posture, and core stabilization for 05 minutes including:   Supine Hip fall out stretch 5x10" RLE  SL IT band stretch 3x30" lying on B sides   SL QL stretch 4x30" lying on Left side  Supine Hip Flexors + Quad stretch 4x30"    Katelin received the following manual therapy techniques: Myofacial release and Soft tissue Mobilization were applied to the: Right hip/IT band/Quad for 00 minutes, including:  PROM DAVONTE   Myofascial release TFL  IASTM roller on lateral quad/ thigh       Home Exercises Provided and Patient Education Provided     Written Home Exercises Provided: Patient instructed to cont prior HEP.  Exercises were reviewed and Katelin was able to demonstrate them prior to the end of the session.  Katelin demonstrated good  understanding of the education provided.     Education provided:   - HEP      Assessment   Patient presented with reportinghip feels ok today, noticed some pain to the lateral side of her Right knee when walking, sometimes it hits sometimes it doesn't. Added more exercises to strengthening hip ER and Glute Med muscle today, she tolerated well. Min cueing for proper form. Patient responded well to intervention without reporting increased in pain. She tolerated session well. Patient reports that she has been lagging on performing hip stretch exercises at home, instructed her the benefits to work on hip stretching " exercises everyday, she verbalized understanding. Will continue as tolerated to PLOF. Patient's reassessment today with PT.    Overall, Katelin Is progressing well towards her goals.       Pt prognosis is Good.     Pt will continue to benefit from skilled outpatient physical therapy to address the deficits listed in the problem list box on initial evaluation, provide pt/family education and to maximize pt's level of independence in the home and community environment.     Pt's spiritual, cultural and educational needs considered and pt agreeable to plan of care and goals.     Anticipated barriers to physical therapy: none    Goals:   Short Term Goals:  2-3 weeks  1.Report decreased in pain at worse less than <   / =  4  /10  to increase tolerance for functional activities. MET  2. Increased B Hip  MMT 1/2  to increase tolerance for ADL and work activities. Partially MET  3. Pt to tolerate HEP to improve ROM and independence with ADL's. MET     Long Term Goals:  6-8 weeks  1.Report decreased in pain at worse less than  <   / =  0  /10  to increase tolerance for functional mobility. On going  2.Increased B hip MMT 1 grade  to increase tolerance for ADL and work activities.On going  3. Pt will scores report 5% limitation on FOTO hip survey  to demonstrate increase in LE function with every day tasks. On going  4. Pt to be Independent with HEP to improve ROM and independence with ADL's. On going  5. Pt will be able to walk on uneven surfaces for 1 hr with no difficulty in order to show improved tolerance for cutting grass. Ongoing     Plan     Plan of care Certification: 12/17/2024 to 3/17/2025.  UPDATED to 5/17/2025     Outpatient Physical Therapy 2 times weekly for 8 weeks to include the following interventions: Gait Training, Manual Therapy, Moist Heat/ Ice, Neuromuscular Re-ed, Patient Education, Self Care, Therapeutic Activities, and Therapeutic Exercise. Dry needling    PT/PTA met face to face to discuss pt's  treatment plan and progress towards established goals. Pt will be seen by a physical therapist minimally every 6th visit or every 30 days.    Gómez To, PTA   4/1/2025

## 2025-04-01 NOTE — PROGRESS NOTES
[facility]                                 PT/PTA conferance        PT/PTA met face to face to discuss pt's treatment plan and progress towards established goals. Pt will be seen by a physical therapist minimally every 6th visit or every 30 days.    Gómez To, PTA  4/1/2025

## 2025-04-03 ENCOUNTER — CLINICAL SUPPORT (OUTPATIENT)
Dept: REHABILITATION | Facility: HOSPITAL | Age: 40
End: 2025-04-03
Payer: COMMERCIAL

## 2025-04-03 DIAGNOSIS — R53.1 WEAKNESS: Primary | ICD-10-CM

## 2025-04-03 DIAGNOSIS — R68.89 DECREASED FUNCTIONAL ACTIVITY TOLERANCE: ICD-10-CM

## 2025-04-03 DIAGNOSIS — M25.651 HIP STIFFNESS, RIGHT: ICD-10-CM

## 2025-04-03 PROCEDURE — 97530 THERAPEUTIC ACTIVITIES: CPT | Mod: PN,CQ

## 2025-04-03 PROCEDURE — 97112 NEUROMUSCULAR REEDUCATION: CPT | Mod: PN,CQ

## 2025-04-03 NOTE — PROGRESS NOTES
"[facility]   Physical Therapy Treatment Note     Name: Katelin Head  Clinic Number: [id number]    Therapy Diagnosis:   Encounter Diagnoses   Name Primary?    Weakness Yes    Decreased functional activity tolerance     Hip stiffness, right        Physician: Miosés Shi MD    Visit Date: 4/3/2025    Physician Orders: PT Eval and Treat   Medical Diagnosis from Referral: M25.551 (ICD-10-CM) - Right hip pain   Evaluation Date: 12/17/2024  Authorization Period Expiration: 12/31/2025  Plan of Care Expiration: 3/17/2025 Updated to 5/17/2025  Visit # / Visits authorized: 14/25 Progress Note Due: 4/11/2025   FOTO: 1/ 1     Precautions: Standard    Visit #: 14 of 25  PTA Visit #: 1/5  Time In: 2:00 pm  Time Out: 2:55 pm   Total Billable Time: 55 minutes    Subjective     Pt reports: hip feels ok today, less pain than last time.  She  n/a  compliant with home exercise program.  Response to previous treatment: improving  Functional change: ongoing    Pain: 1/10  Location: Right hip    Objective     Katelin participated in dynamic functional therapeutic activities to improve functional performance for 38 minutes, including:  Upright Bike 10 min level 2  Treadmill speed 2.1, 10 min (to improve BLE strength, endurance, and standing/walking tolerance)  TKE L knee 20 x 5" Red sports cord   Sled push 15# x 3 laps   Shuttle Hip extension shuttle (donkey kick) 1.5 bands 2 x10 bilateral   Shuttle SL 2 Black + 1 Red bands 3x10 each side  Sit to Stand from 20" 3 x10 (tapping butt) no weight, building squat mechanics holding 10# KB  Walking Lunges with 2x10# KB hold on each hand 1 lap  Lateral steps GTB @ankles x 2 laps   Monster walk GTB @ ankles x 2 laps  Reverse Monster walk GTB @ ankles x 1 lap  Matrix B Knee Ext 15# 3 x 10    +Matrix Knee Flexion 30# 3x10  Matrix Hip Abd 20# 3x10  Matrix Hip Add 20# 3x10     Katelin participated in neuromuscular re-education activities to improve: Balance, Coordination, " "Proprioception, Posture, and muscle control for 10 minutes. The following activities were included:  Hamstring sweeps 1 lap  Quad pull 1 lap  Glute burners 2 x 10 (abd, posteriolateral, ext) with 2# ankle weight   Hooklying Bridges DL with GTB around knees 10x  Hooklying Bridges SL with GTB around knees 2x10 each leg  SL Clamshell with GTB 3x10x3" RLE  Seated EOM Hip IR and ER with GTB 3x10x3" RLE  Fire Hydrant GTB 3x10x3" RLE    Katelin received therapeutic exercises to develop strength, endurance, ROM, flexibility, posture, and core stabilization for 07 minutes including:   Supine Hip fall out stretch 5x10" RLE  SL IT band stretch 3x30" lying on B sides   SL QL stretch 4x30" lying on Left side  Prone Hip Flexors + Quad stretch 4x30"  Seated Piriformis stretch 4x30"    Katelin received the following manual therapy techniques: Myofacial release and Soft tissue Mobilization were applied to the: Right hip/IT band/Quad for 00 minutes, including:  PROM DAVONTE   Myofascial release TFL  IASTM roller on lateral quad/ thigh       Home Exercises Provided and Patient Education Provided     Written Home Exercises Provided: Patient instructed to cont prior HEP.  Exercises were reviewed and Katelin was able to demonstrate them prior to the end of the session.  Katelin demonstrated good  understanding of the education provided.     Education provided:   - HEP      Assessment   Patient presented with reporting hip feels ok today, less pain than last time. Added Matrix Knee Flex this visit, she tolerated well. Min cueing for proper form. Patient responded well to intervention without reporting increased in pain. She tolerated session well. Instructed her the benefits to work on hip stretching exercises everyday, she verbalized understanding. Will continue as tolerated to PLOF.     Overall, Katelin Is progressing well towards her goals.       Pt prognosis is Good.     Pt will continue to benefit from skilled outpatient physical therapy to " address the deficits listed in the problem list box on initial evaluation, provide pt/family education and to maximize pt's level of independence in the home and community environment.     Pt's spiritual, cultural and educational needs considered and pt agreeable to plan of care and goals.     Anticipated barriers to physical therapy: none    Goals:   Short Term Goals:  2-3 weeks  1.Report decreased in pain at worse less than <   / =  4  /10  to increase tolerance for functional activities. MET  2. Increased B Hip  MMT 1/2  to increase tolerance for ADL and work activities. Partially MET  3. Pt to tolerate HEP to improve ROM and independence with ADL's. MET     Long Term Goals:  6-8 weeks  1.Report decreased in pain at worse less than  <   / =  0  /10  to increase tolerance for functional mobility. On going  2.Increased B hip MMT 1 grade  to increase tolerance for ADL and work activities.On going  3. Pt will scores report 5% limitation on FOTO hip survey  to demonstrate increase in LE function with every day tasks. On going  4. Pt to be Independent with HEP to improve ROM and independence with ADL's. On going  5. Pt will be able to walk on uneven surfaces for 1 hr with no difficulty in order to show improved tolerance for cutting grass. Ongoing     Plan     Plan of care Certification: 12/17/2024 to 3/17/2025.  UPDATED to 5/17/2025     Outpatient Physical Therapy 2 times weekly for 8 weeks to include the following interventions: Gait Training, Manual Therapy, Moist Heat/ Ice, Neuromuscular Re-ed, Patient Education, Self Care, Therapeutic Activities, and Therapeutic Exercise. Dry needling    Gómez To, PTA   4/3/2025

## 2025-04-08 ENCOUNTER — PATIENT MESSAGE (OUTPATIENT)
Dept: FAMILY MEDICINE | Facility: CLINIC | Age: 40
End: 2025-04-08
Payer: COMMERCIAL

## 2025-04-08 ENCOUNTER — CLINICAL SUPPORT (OUTPATIENT)
Dept: REHABILITATION | Facility: HOSPITAL | Age: 40
End: 2025-04-08
Payer: COMMERCIAL

## 2025-04-08 DIAGNOSIS — M25.561 RIGHT KNEE PAIN, UNSPECIFIED CHRONICITY: Primary | ICD-10-CM

## 2025-04-08 DIAGNOSIS — R68.89 DECREASED FUNCTIONAL ACTIVITY TOLERANCE: ICD-10-CM

## 2025-04-08 DIAGNOSIS — M25.651 HIP STIFFNESS, RIGHT: ICD-10-CM

## 2025-04-08 DIAGNOSIS — R53.1 WEAKNESS: Primary | ICD-10-CM

## 2025-04-08 PROCEDURE — 97112 NEUROMUSCULAR REEDUCATION: CPT | Mod: PN,CQ

## 2025-04-08 PROCEDURE — 97530 THERAPEUTIC ACTIVITIES: CPT | Mod: PN,CQ

## 2025-04-08 NOTE — PROGRESS NOTES
"[facility]   Physical Therapy Treatment Note     Name: Katelin Head  Clinic Number: [id number]    Therapy Diagnosis:   Encounter Diagnoses   Name Primary?    Weakness Yes    Decreased functional activity tolerance     Hip stiffness, right        Physician: Moisés Shi MD    Visit Date: 4/8/2025    Physician Orders: PT Eval and Treat   Medical Diagnosis from Referral: M25.551 (ICD-10-CM) - Right hip pain   Evaluation Date: 12/17/2024  Authorization Period Expiration: 12/31/2025  Plan of Care Expiration: 3/17/2025 Updated to 5/17/2025  Visit # / Visits authorized: 15/25 Progress Note Due: 4/11/2025   FOTO: 1/ 1     Precautions: Standard    Visit #: 15 of 25  PTA Visit #: 2/5  Time In: 9:00 am  Time Out: 9:55 am   Total Billable Time: 55 minutes    Subjective     Pt reports: hip feels ok today, lateral side of knee with min pain.  She  n/a  compliant with home exercise program.  Response to previous treatment: improving  Functional change: ongoing    Pain: 1/10  Location: 0/10 Right hip, 1/10 Right knee    Objective     Katelin participated in dynamic functional therapeutic activities to improve functional performance for 38 minutes, including:  Upright Bike 10 min level 2  Treadmill speed 2.1, 10 min (to improve BLE strength, endurance, and standing/walking tolerance), inclined 4  TKE L knee 3x10 x 5" Purple sports cord   Sled push +20# x 3 laps (will apply bands around knees)  Shuttle Hip extension shuttle (donkey kick) +2 Black bands 2 x10 bilateral   Shuttle SL +3 Black bands 3x10 each side  Sit to Stand from 20" 3 x10 (tapping butt) building squat mechanics holding +25# round weight  Walking Lunges with 2x10# KB hold on each hand 1 lap  Lateral steps GTB @ankles x 2 laps   Monster walk GTB @ ankles x 2 laps  Reverse Monster walk GTB @ ankles x 1 lap  Matrix B Knee Ext 15# 3 x 10    Matrix Knee Flexion 30# 3x10  Matrix Hip Abd 20# 3x10  Matrix Hip Add 20# 3x10   +Rebounder SL RLE 2x10 " "Shad Thomason participated in neuromuscular re-education activities to improve: Balance, Coordination, Proprioception, Posture, and muscle control for 10 minutes. The following activities were included:  Hamstring sweeps 1 lap  Quad pull 1 lap  Glute burners 2 x 10 (abd, posteriolateral, ext) with 2# ankle weight   Hooklying Bridges DL with GTB around knees 10x  Hooklying Bridges SL with GTB around knees 2x10 each leg  SL Clamshell with GTB 3x10x3" RLE  Seated EOM Hip IR and ER with GTB 3x10x3" RLE  Fire Hydrant GTB 3x10x3" RLE  +SLS 3x30" RLE  +SLS on foam 3x30" RLE      Katelin received therapeutic exercises to develop strength, endurance, ROM, flexibility, posture, and core stabilization for 07 minutes including:   Supine Hip fall out stretch 5x10" RLE  SL IT band stretch 3x30" lying on B sides   SL QL stretch 4x30" lying on Left side  Prone Hip Flexors + Quad stretch 4x30"  Seated Piriformis stretch 4x30"    Katelin received the following manual therapy techniques: Myofacial release and Soft tissue Mobilization were applied to the: Right hip/IT band/Quad for 00 minutes, including:  PROM DAVONTE   Myofascial release TFL  IASTM roller on lateral quad/ thigh       Home Exercises Provided and Patient Education Provided     Written Home Exercises Provided: Patient instructed to cont prior HEP.  Exercises were reviewed and Katelin was able to demonstrate them prior to the end of the session.  Katelin demonstrated good  understanding of the education provided.     Education provided:   - HEP      Assessment   Patient presented to clinic with reporting hip feels ok today, lateral side of knee with min pain. She says she has been noticing Right knee pain and she may have injured it when cutting grass on uneven surfaces. We cont with functional exercises from last visit, noted min instability to Right knee during Sled push exercises without reporting pain. Advised her to use knee brace when she does things around the house " like cutting grass walking on uneven surfaces, squatting down doing yard works, etc, and contact her doctor about her knee pain, she verbalized understanding. Min cueing for proper form. Patient responded well to intervention without reporting increased in pain. She tolerated session well. Instructed her the benefits to work on hip stretching exercises everyday, she verbalized understanding. Will continue as tolerated to PLOF.     Overall, Katelin Is progressing well towards her goals.     Pt prognosis is Good.     Pt will continue to benefit from skilled outpatient physical therapy to address the deficits listed in the problem list box on initial evaluation, provide pt/family education and to maximize pt's level of independence in the home and community environment.     Pt's spiritual, cultural and educational needs considered and pt agreeable to plan of care and goals.     Anticipated barriers to physical therapy: none    Goals:   Short Term Goals:  2-3 weeks  1.Report decreased in pain at worse less than <   / =  4  /10  to increase tolerance for functional activities. MET  2. Increased B Hip  MMT 1/2  to increase tolerance for ADL and work activities. Partially MET  3. Pt to tolerate HEP to improve ROM and independence with ADL's. MET     Long Term Goals:  6-8 weeks  1.Report decreased in pain at worse less than  <   / =  0  /10  to increase tolerance for functional mobility. On going  2.Increased B hip MMT 1 grade  to increase tolerance for ADL and work activities.On going  3. Pt will scores report 5% limitation on FOTO hip survey  to demonstrate increase in LE function with every day tasks. On going  4. Pt to be Independent with HEP to improve ROM and independence with ADL's. On going  5. Pt will be able to walk on uneven surfaces for 1 hr with no difficulty in order to show improved tolerance for cutting grass. Ongoing     Plan     Plan of care Certification: 12/17/2024 to 3/17/2025.  UPDATED to 5/17/2025      Outpatient Physical Therapy 2 times weekly for 8 weeks to include the following interventions: Gait Training, Manual Therapy, Moist Heat/ Ice, Neuromuscular Re-ed, Patient Education, Self Care, Therapeutic Activities, and Therapeutic Exercise. Dry needling    Gómez To, PTA   4/8/2025

## 2025-04-08 NOTE — TELEPHONE ENCOUNTER
Called patient to verify which knee is giving her trouble? Patient states it's her right knee. Provider will be notified.

## 2025-04-10 ENCOUNTER — CLINICAL SUPPORT (OUTPATIENT)
Dept: REHABILITATION | Facility: HOSPITAL | Age: 40
End: 2025-04-10
Payer: COMMERCIAL

## 2025-04-10 DIAGNOSIS — R68.89 DECREASED FUNCTIONAL ACTIVITY TOLERANCE: ICD-10-CM

## 2025-04-10 DIAGNOSIS — M25.651 HIP STIFFNESS, RIGHT: ICD-10-CM

## 2025-04-10 DIAGNOSIS — R53.1 WEAKNESS: Primary | ICD-10-CM

## 2025-04-10 PROCEDURE — 97530 THERAPEUTIC ACTIVITIES: CPT | Mod: PN,CQ

## 2025-04-10 PROCEDURE — 97112 NEUROMUSCULAR REEDUCATION: CPT | Mod: PN,CQ

## 2025-04-10 NOTE — PROGRESS NOTES
"[facility]   Physical Therapy Treatment Note     Name: Katelin Head  Clinic Number: [id number]    Therapy Diagnosis:   Encounter Diagnoses   Name Primary?    Weakness Yes    Decreased functional activity tolerance     Hip stiffness, right        Physician: Moisés Shi MD    Visit Date: 4/10/2025    Physician Orders: PT Eval and Treat   Medical Diagnosis from Referral: M25.551 (ICD-10-CM) - Right hip pain   Evaluation Date: 12/17/2024  Authorization Period Expiration: 12/31/2025  Plan of Care Expiration: 3/17/2025 Updated to 5/17/2025  Visit # / Visits authorized: 16/25 Progress Note Due: 4/11/2025   FOTO: 1/ 1     Precautions: Standard    Visit #: 16 of 25  PTA Visit #: 3/5  Time In: 9:00 am  Time Out: 9:55 am   Total Billable Time: 55 minutes    Subjective     Pt reports: hip feels ok today, lateral side of knee with min pain.  She  n/a  compliant with home exercise program.  Response to previous treatment: improving  Functional change: ongoing    Pain: 1/10  Location: 0/10 Right hip, 1/10 Right knee    Objective     Katelin participated in dynamic functional therapeutic activities to improve functional performance for 38 minutes, including:  Upright Bike 10 min level 2  Treadmill speed 2.1, 10 min (to improve BLE strength, endurance, and standing/walking tolerance), inclined 4  TKE L knee 3x10 x 5" Purple sports cord   Sled push +20# x 3 laps (will apply bands around knees)  Shuttle Hip extension shuttle (donkey kick) +2 Black bands 2 x10 bilateral   Shuttle SL +3 Black bands 3x10 each side  Sit to Stand from 20" 3 x10 (tapping butt) building squat mechanics holding +25# round weight  Walking Lunges with 2x10# KB hold on each hand 1 lap  Lateral steps GTB @ankles x 2 laps   Monster walk GTB @ ankles x 2 laps  Reverse Monster walk GTB @ ankles x 1 lap  Matrix B Knee Ext 20# 3 x 10    Matrix Knee Flexion 30# 3x10  Matrix Hip Abd 30# 3x10  Matrix Hip Add 30# 3x10   Rebounder SL RLE 2x10 " "+Yellow ball  +Trap bar deadlift 2x10     Katelin participated in neuromuscular re-education activities to improve: Balance, Coordination, Proprioception, Posture, and muscle control for 10 minutes. The following activities were included:  Hamstring sweeps 1 lap  Quad pull 1 lap  Glute burners 2 x 10 (abd, posteriolateral, ext) with +3# ankle weight   Hooklying Bridges DL with GTB around knees 10x  Hooklying Bridges SL with GTB around knees 2x10 each leg  SL Clamshell with GTB 3x10x3" RLE  Seated EOM Hip IR and ER with GTB 3x10x3" RLE  Fire Hydrant GTB 3x10x3" RLE  SLS on foam 3x30" RLE      Katelin received therapeutic exercises to develop strength, endurance, ROM, flexibility, posture, and core stabilization for 07 minutes including:   Supine Hip fall out stretch 5x10" RLE  SL IT band stretch 3x30" lying on B sides   SL QL stretch 4x30" lying on Left side  Prone Hip Flexors + Quad stretch 4x30"  Seated Piriformis stretch 4x30"    Katelin received the following manual therapy techniques: Myofacial release and Soft tissue Mobilization were applied to the: Right hip/IT band/Quad for 00 minutes, including:  PROM DAVONTE   Myofascial release TFL  IASTM roller on lateral quad/ thigh       Home Exercises Provided and Patient Education Provided     Written Home Exercises Provided: Patient instructed to cont prior HEP.  Exercises were reviewed and Katelin was able to demonstrate them prior to the end of the session.  Katelin demonstrated good  understanding of the education provided.     Education provided:   - HEP      Assessment   Patient presented to clinic with reporting hip feels ok today, lateral side of knee with min pain. Same intervention like last visit, added trap bar deadlift today, she tolerated well. Required cueing during deadlift to prevent increased pressure to knees. Min cueing for proper form for most of exercises. Progressing with increased weight to Matrix machine. Patient responded well to intervention without " reporting increased in pain. She tolerated session well. Instructed her the benefits to work on hip stretching exercises everyday, she verbalized understanding. Will continue as tolerated to PLOF. We just got a new referral from her doctor about Right knee pain, patient will see PT next visit for reassessment/re-evaluation.   Overall, Katelin Is progressing well towards her goals.     Pt prognosis is Good.     Pt will continue to benefit from skilled outpatient physical therapy to address the deficits listed in the problem list box on initial evaluation, provide pt/family education and to maximize pt's level of independence in the home and community environment.     Pt's spiritual, cultural and educational needs considered and pt agreeable to plan of care and goals.     Anticipated barriers to physical therapy: none    Goals:   Short Term Goals:  2-3 weeks  1.Report decreased in pain at worse less than <   / =  4  /10  to increase tolerance for functional activities. MET  2. Increased B Hip  MMT 1/2  to increase tolerance for ADL and work activities. Partially MET  3. Pt to tolerate HEP to improve ROM and independence with ADL's. MET     Long Term Goals:  6-8 weeks  1.Report decreased in pain at worse less than  <   / =  0  /10  to increase tolerance for functional mobility. On going  2.Increased B hip MMT 1 grade  to increase tolerance for ADL and work activities.On going  3. Pt will scores report 5% limitation on FOTO hip survey  to demonstrate increase in LE function with every day tasks. On going  4. Pt to be Independent with HEP to improve ROM and independence with ADL's. On going  5. Pt will be able to walk on uneven surfaces for 1 hr with no difficulty in order to show improved tolerance for cutting grass. Ongoing     Plan     Plan of care Certification: 12/17/2024 to 3/17/2025.  UPDATED to 5/17/2025     Outpatient Physical Therapy 2 times weekly for 8 weeks to include the following interventions: Gait  Training, Manual Therapy, Moist Heat/ Ice, Neuromuscular Re-ed, Patient Education, Self Care, Therapeutic Activities, and Therapeutic Exercise. Dry needling    Gómez To, PTA   4/10/2025

## 2025-04-15 ENCOUNTER — CLINICAL SUPPORT (OUTPATIENT)
Dept: REHABILITATION | Facility: HOSPITAL | Age: 40
End: 2025-04-15
Payer: COMMERCIAL

## 2025-04-15 DIAGNOSIS — R68.89 DECREASED FUNCTIONAL ACTIVITY TOLERANCE: ICD-10-CM

## 2025-04-15 DIAGNOSIS — R53.1 WEAKNESS: Primary | ICD-10-CM

## 2025-04-15 DIAGNOSIS — M25.651 HIP STIFFNESS, RIGHT: ICD-10-CM

## 2025-04-15 PROCEDURE — 97530 THERAPEUTIC ACTIVITIES: CPT | Mod: PN

## 2025-04-15 NOTE — PROGRESS NOTES
"[facility]   Physical Therapy Treatment Note     Name: Katelin Head  Clinic Number: [id number]    Therapy Diagnosis:   Encounter Diagnoses   Name Primary?    Weakness Yes    Decreased functional activity tolerance     Hip stiffness, right          Physician: Moisés Shi MD    Visit Date: 4/15/2025    Physician Orders: PT Eval and Treat   Medical Diagnosis from Referral: M25.551 (ICD-10-CM) - Right hip pain   Evaluation Date: 12/17/2024  Authorization Period Expiration: 12/31/2025  Plan of Care Expiration: 3/17/2025 Updated to 5/17/2025  Visit # / Visits authorized: 17/25 Progress Note Due: 5/15/2025   FOTO: 1/ 1     Precautions: Standard    Visit #: 16 of 25  PTA Visit #: 3/5  Time In: 8:00 am  Time Out: 9:00 am   Total Billable Time: 30 minutes    Subjective     Pt reports: R knee has been bothering her in the front of it. Hip is doing better and not as painful. However, her knee has been increasingly becoming a limiting factor and has pain with climbing, squatting, "stepping on it".   She  n/a  compliant with home exercise program.  Response to previous treatment: improving  Functional change: ongoing    Pain: not taken/10  Location: 0/10 Right hip, 1/10 Right knee    Objective                     Right LE   Left LE       Knee extension: 4+/5 Knee extension: 5/5     Knee flexion: 5/5 Knee flexion: 5/5     Hip flexion: 5/5 Hip flexion: 5/5     Hip Internal Rotation:  5/5    Hip Internal Rotation: 5/5      Hip External Rotation: 5/5    Hip External Rotation: 4+/5      Hip extension:  4+/5 Hip extension: 4+/5     Hip abduction: 4+/5 Hip abduction: 4+/5     Hip adduction: NT Hip adduction: NT     Ankle dorsiflexion: 5/5 Ankle dorsiflexion: 5/5     Ankle plantarflexion: 5/5 Ankle plantarflexion: 5/5      Patellar tendon TTP, suprapatellar TTP          Katelin participated in dynamic functional therapeutic activities to improve functional performance for 53 minutes, including:  Time for " "Reassessment   Upright Bike 10 min level 2  Treadmill speed 2.1, 10 min (to improve BLE strength, endurance, and standing/walking tolerance), inclined 4  TKE L knee 3x10 x 5" Purple sports cord   Shuttle Hip extension shuttle (donkey kick) +2 Black bands 2 x10 bilateral   Shuttle SL +3 Black bands 3x10 each side  Rebounder SL RLE 2x10 +Yellow ball  +Matrix R Knee Ext  eccentric (2 up, 1 down) 15# 3 x 8   +[place] squat hold 3 x 30 sec holds  +wall sit  1 x 30 sec hold (for HEP)  Sled push +20# x 3 laps (will apply bands around knees)  Sit to Stand from 20" 3 x10 (tapping butt) building squat mechanics holding +25# round weight  Walking Lunges with 2x10# KB hold on each hand 1 lap  Lateral steps GTB @ankles x 2 laps   Monster walk GTB @ ankles x 2 laps  Reverse Monster walk GTB @ ankles x 1 lap  Matrix Knee Flexion 30# 3x10  Matrix Hip Abd 30# 3x10  Matrix Hip Add 30# 3x10   +Trap bar deadlift 2x10     Katelin participated in neuromuscular re-education activities to improve: Balance, Coordination, Proprioception, Posture, and muscle control for 00 minutes. The following activities were included:  Hamstring sweeps 1 lap  Quad pull 1 lap  Glute burners 2 x 10 (abd, posteriolateral, ext) with +3# ankle weight   Hooklying Bridges DL with GTB around knees 10x  Hooklying Bridges SL with GTB around knees 2x10 each leg  SL Clamshell with GTB 3x10x3" RLE  Seated EOM Hip IR and ER with GTB 3x10x3" RLE  Fire Hydrant GTB 3x10x3" RLE  SLS on foam 3x30" RLE      Katelin received therapeutic exercises to develop strength, endurance, ROM, flexibility, posture, and core stabilization for 00 minutes including:   Supine Hip fall out stretch 5x10" RLE  SL IT band stretch 3x30" lying on B sides   SL QL stretch 4x30" lying on Left side  Prone Hip Flexors + Quad stretch 4x30"  Seated Piriformis stretch 4x30"    Katelin received the following manual therapy techniques: Myofacial release and Soft tissue Mobilization were applied to the: Right " hip/IT band/Quad for 00 minutes, including:  PROM DAVONTE   Myofascial release TFL  IASTM roller on lateral quad/ thigh       Home Exercises Provided and Patient Education Provided     Written Home Exercises Provided: Patient instructed to cont prior HEP.  Exercises were reviewed and Katelin was able to demonstrate them prior to the end of the session.  Katelin demonstrated good  understanding of the education provided.     Education provided:   - HEP      Assessment   Patient reassessed today. She has approved referral from physician regarding knee pain. Upon assessment, pt displayed signs and symptoms associated with patellar tendinopathy. She was introduced to [place]ian squat holds, eccentric Knee extensions,  and given wall sits as HEP with education on interventions for tendons. No adverse effects from newly implemented exercises and will progress as tolerated to PLOF.   Overall, Katelin Is progressing well towards her goals.     Pt prognosis is Good.     Pt will continue to benefit from skilled outpatient physical therapy to address the deficits listed in the problem list box on initial evaluation, provide pt/family education and to maximize pt's level of independence in the home and community environment.     Pt's spiritual, cultural and educational needs considered and pt agreeable to plan of care and goals.     Anticipated barriers to physical therapy: none    Goals:   Short Term Goals:  2-3 weeks  1.Report decreased in pain at worse less than <   / =  4  /10  to increase tolerance for functional activities. MET  2. Increased B Hip  MMT 1/2  to increase tolerance for ADL and work activities. Partially MET  3. Pt to tolerate HEP to improve ROM and independence with ADL's. MET     Long Term Goals:  6-8 weeks  1.Report decreased in pain at worse less than  <   / =  0  /10  to increase tolerance for functional mobility. On going  2.Increased B hip MMT 1 grade  to increase tolerance for ADL and work activities.On  going  3. Pt will scores report 5% limitation on FOTO hip survey  to demonstrate increase in LE function with every day tasks. On going  4. Pt to be Independent with HEP to improve ROM and independence with ADL's. On going  5. Pt will be able to walk on uneven surfaces for 1 hr with no difficulty in order to show improved tolerance for cutting grass. Ongoing     Plan     Plan of care Certification: 12/17/2024 to 3/17/2025.  UPDATED to 5/17/2025     Outpatient Physical Therapy 2 times weekly for 8 weeks to include the following interventions: Gait Training, Manual Therapy, Moist Heat/ Ice, Neuromuscular Re-ed, Patient Education, Self Care, Therapeutic Activities, and Therapeutic Exercise. Dry needling    Ross Woods Jr, PT   4/15/2025

## 2025-04-17 ENCOUNTER — CLINICAL SUPPORT (OUTPATIENT)
Dept: REHABILITATION | Facility: HOSPITAL | Age: 40
End: 2025-04-17
Payer: COMMERCIAL

## 2025-04-17 DIAGNOSIS — R53.1 WEAKNESS: Primary | ICD-10-CM

## 2025-04-17 DIAGNOSIS — R68.89 DECREASED FUNCTIONAL ACTIVITY TOLERANCE: ICD-10-CM

## 2025-04-17 DIAGNOSIS — M25.651 HIP STIFFNESS, RIGHT: ICD-10-CM

## 2025-04-17 PROCEDURE — 97530 THERAPEUTIC ACTIVITIES: CPT | Mod: PN,CQ

## 2025-04-17 PROCEDURE — 97112 NEUROMUSCULAR REEDUCATION: CPT | Mod: PN,CQ

## 2025-04-17 NOTE — PROGRESS NOTES
[facility]   Physical Therapy Treatment Note     Name: Katelin Head  Clinic Number: [id number]    Therapy Diagnosis:   Encounter Diagnoses   Name Primary?    Weakness Yes    Decreased functional activity tolerance     Hip stiffness, right          Physician: Moisés Shi MD    Visit Date: 4/17/2025    Physician Orders: PT Eval and Treat   Medical Diagnosis from Referral: M25.551 (ICD-10-CM) - Right hip pain   Evaluation Date: 12/17/2024  Authorization Period Expiration: 12/31/2025  Plan of Care Expiration: 3/17/2025 Updated to 5/17/2025  Visit # / Visits authorized: 18/25 Progress Note Due: 5/15/2025   FOTO: 1/ 1     Precautions: Standard    Visit #: 18 of 25  PTA Visit #: 1/5  Time In: 9:00 am  Time Out: 10:00 am   Total Billable Time: 55 minutes    Subjective     Pt reports: Right hip and knee doing ok today. She says she hit her back to a towel hook on wall while backing up and now feels sore like having a bruise.  She  n/a  compliant with home exercise program.  Response to previous treatment: improving  Functional change: ongoing    Pain: not taken/10  Location: 0/10 Right hip, 1/10 Right knee    Objective                     Right LE   Left LE       Knee extension: 4+/5 Knee extension: 5/5     Knee flexion: 5/5 Knee flexion: 5/5     Hip flexion: 5/5 Hip flexion: 5/5     Hip Internal Rotation:  5/5    Hip Internal Rotation: 5/5      Hip External Rotation: 5/5    Hip External Rotation: 4+/5      Hip extension:  4+/5 Hip extension: 4+/5     Hip abduction: 4+/5 Hip abduction: 4+/5     Hip adduction: NT Hip adduction: NT     Ankle dorsiflexion: 5/5 Ankle dorsiflexion: 5/5     Ankle plantarflexion: 5/5 Ankle plantarflexion: 5/5      Patellar tendon TTP, suprapatellar TTP    Katelin participated in dynamic functional therapeutic activities to improve functional performance for 38 minutes, including:  Upright Bike 10 min level 2  Treadmill speed 2.1, 10 min (to improve BLE strength,  "endurance, and standing/walking tolerance), inclined 4  TKE L knee 3x10 x 5" Purple sports cord   Shuttle Hip extension shuttle (donkey kick) +2 Black bands 2 x10 bilateral   Shuttle SL +3 Black bands 3x10 each side  Rebounder SL RLE 2x10 +Yellow ball  Matrix R Knee Ext  eccentric (2 up, 1 down) 15# 3 x +10  [place]ic squat hold 3 x 30 sec holds  Wall sit  1 x 30 sec hold (for HEP)  Sled push +20# x 3 laps (will apply bands around knees)  Sit to Stand from 20" 3 x10 (tapping butt) building squat mechanics holding +25# round weight  Walking Lunges with 2x10# KB hold on each hand 1 lap  Lateral steps GTB @ankles x 2 laps   Monster walk GTB @ ankles x 2 laps  Reverse Monster walk GTB @ ankles x 1 lap  Matrix Knee Flexion 30# 3x10  Matrix Hip Abd 30# 3x10  Matrix Hip Add 30# 3x10   +Trap bar deadlift 2x10     Katelin participated in neuromuscular re-education activities to improve: Balance, Coordination, Proprioception, Posture, and muscle control for 12 minutes. The following activities were included:  Hamstring sweeps 1 lap  Quad pull 1 lap  Glute burners 2 x 10 (abd, posteriolateral, ext) with +3# ankle weight   Hooklying Bridges DL with GTB around knees 10x  Hooklying Bridges SL with GTB around knees 2x10 each leg  SL Clamshell with GTB 3x10x3" RLE  Seated EOM Hip IR and ER with GTB 3x10x3" RLE  Fire Hydrant GTB 3x10x3" RLE  SLS on foam 3x30" RLE      Katelin received therapeutic exercises to develop strength, endurance, ROM, flexibility, posture, and core stabilization for 05 minutes including:   Supine Hip fall out stretch 5x10" RLE  SL IT band stretch 3x30" lying on B sides   SL QL stretch 4x30" lying on Left side  Prone Hip Flexors + Quad stretch 4x30"  Seated Piriformis stretch 4x30"    Katelin received the following manual therapy techniques: Myofacial release and Soft tissue Mobilization were applied to the: Right hip/IT band/Quad for 00 minutes, including:  PROM DAVONTE   Myofascial release TFL  IASTM roller on " lateral quad/ thigh       Home Exercises Provided and Patient Education Provided     Written Home Exercises Provided: Patient instructed to cont prior HEP.  Exercises were reviewed and Katelin was able to demonstrate them prior to the end of the session.  Katelin demonstrated good  understanding of the education provided.     Education provided:   - HEP      Assessment   Patient presents to clinic with reporting Right Hip and Knee feeling ok today. No adverse effects from newly implemented exercises and will progress as tolerated to PLOF. Educated pt to take it easy with her yard work and stuff around the house, wearing knee brace with heavy push/lifting or walking on uneven surface to protect her knee, she verbalized good understanding. Will cont to progress per patient tolerance.  Overall, Katelin Is progressing well towards her goals.     Pt prognosis is Good.     Pt will continue to benefit from skilled outpatient physical therapy to address the deficits listed in the problem list box on initial evaluation, provide pt/family education and to maximize pt's level of independence in the home and community environment.     Pt's spiritual, cultural and educational needs considered and pt agreeable to plan of care and goals.     Anticipated barriers to physical therapy: none    Goals:   Short Term Goals:  2-3 weeks  1.Report decreased in pain at worse less than <   / =  4  /10  to increase tolerance for functional activities. MET  2. Increased B Hip  MMT 1/2  to increase tolerance for ADL and work activities. Partially MET  3. Pt to tolerate HEP to improve ROM and independence with ADL's. MET     Long Term Goals:  6-8 weeks  1.Report decreased in pain at worse less than  <   / =  0  /10  to increase tolerance for functional mobility. On going  2.Increased B hip MMT 1 grade  to increase tolerance for ADL and work activities.On going  3. Pt will scores report 5% limitation on FOTO hip survey  to demonstrate increase in LE  function with every day tasks. On going  4. Pt to be Independent with HEP to improve ROM and independence with ADL's. On going  5. Pt will be able to walk on uneven surfaces for 1 hr with no difficulty in order to show improved tolerance for cutting grass. Ongoing     Plan     Plan of care Certification: 12/17/2024 to 3/17/2025.  UPDATED to 5/17/2025     Outpatient Physical Therapy 2 times weekly for 8 weeks to include the following interventions: Gait Training, Manual Therapy, Moist Heat/ Ice, Neuromuscular Re-ed, Patient Education, Self Care, Therapeutic Activities, and Therapeutic Exercise. Dry needling    Gómez To, PTA   4/17/2025

## 2025-04-22 ENCOUNTER — CLINICAL SUPPORT (OUTPATIENT)
Dept: REHABILITATION | Facility: HOSPITAL | Age: 40
End: 2025-04-22
Payer: COMMERCIAL

## 2025-04-22 DIAGNOSIS — M25.651 HIP STIFFNESS, RIGHT: ICD-10-CM

## 2025-04-22 DIAGNOSIS — R68.89 DECREASED FUNCTIONAL ACTIVITY TOLERANCE: ICD-10-CM

## 2025-04-22 DIAGNOSIS — R53.1 WEAKNESS: Primary | ICD-10-CM

## 2025-04-22 PROCEDURE — 97530 THERAPEUTIC ACTIVITIES: CPT | Mod: PN

## 2025-04-22 PROCEDURE — 97112 NEUROMUSCULAR REEDUCATION: CPT | Mod: PN

## 2025-04-22 PROCEDURE — 97110 THERAPEUTIC EXERCISES: CPT | Mod: PN

## 2025-04-22 NOTE — PROGRESS NOTES
[facility]   Physical Therapy Treatment Note     Name: Katelin Head  Clinic Number: [id number]    Therapy Diagnosis:   Encounter Diagnoses   Name Primary?    Weakness Yes    Decreased functional activity tolerance     Hip stiffness, right            Physician: Moisés Shi MD    Visit Date: 4/22/2025    Physician Orders: PT Eval and Treat   Medical Diagnosis from Referral: M25.551 (ICD-10-CM) - Right hip pain   Evaluation Date: 12/17/2024  Authorization Period Expiration: 12/31/2025  Plan of Care Expiration: 3/17/2025 Updated to 5/17/2025  Visit # / Visits authorized: 19/25 Progress Note Due: 5/15/2025   FOTO: 1/ 1     Precautions: Standard    Visit #: 19 of 25  PTA Visit #: 1/5  Time In: 9:00 am  Time Out: 10:00 am   Total Billable Time: 53 minutes    Subjective     Pt reports: hip and knee are doing good and she cut grass over the weekend without any issues.   She  n/a  compliant with home exercise program.  Response to previous treatment: improving  Functional change: ongoing    Pain: not taken/10  Location: 0/10 Right hip, 1/10 Right knee    Objective                     Right LE   Left LE       Knee extension: 4+/5 Knee extension: 5/5     Knee flexion: 5/5 Knee flexion: 5/5     Hip flexion: 5/5 Hip flexion: 5/5     Hip Internal Rotation:  5/5    Hip Internal Rotation: 5/5      Hip External Rotation: 5/5    Hip External Rotation: 4+/5      Hip extension:  4+/5 Hip extension: 4+/5     Hip abduction: 4+/5 Hip abduction: 4+/5     Hip adduction: NT Hip adduction: NT     Ankle dorsiflexion: 5/5 Ankle dorsiflexion: 5/5     Ankle plantarflexion: 5/5 Ankle plantarflexion: 5/5      Patellar tendon TTP, suprapatellar TTP    Katelin participated in dynamic functional therapeutic activities to improve functional performance for 15 minutes, including:  Upright Bike 10 min level 2  Treadmill speed 2.1, 10 min (to improve BLE strength, endurance, and standing/walking tolerance), inclined 4  Sled push  "+20# x 3 laps  Walking Lunges with 2x10# KB hold on each hand 1 lap (painful knees; regressed to split squat )      Shuttle Hip extension shuttle (donkey kick) +2 Black bands 2 x10 bilateral   Shuttle SL +3 Black bands 3x10 each side  Rebounder SL RLE 2x10 +Yellow ball  Wall sit  1 x 30 sec hold (for HEP)  Sit to Stand from 20" 3 x10 (tapping butt) building squat mechanics holding +25# round weight  Matrix Hip Abd 30# 3x10  Matrix Hip Add 30# 3x10   +Trap bar deadlift 2x10     Katelin participated in neuromuscular re-education activities to improve: Balance, Coordination, Proprioception, Posture, and muscle control for 15 minutes. The following activities were included:  Telugu squat isometrics hold 4 x 30" teal band  TKE R knee 3x10 x 5" Teal band   Matrix eccentric R knee ext ( 2 up, 1 down ) 15# 3 x 10   +Split squats 2 x 10 10#     Hamstring sweeps 1 lap  Quad pull 1 lap  Glute burners 2 x 10 (abd, posteriolateral, ext) with +3# ankle weight   Hooklying Bridges DL with GTB around knees 10x  Hooklying Bridges SL with GTB around knees 2x10 each leg  SL Clamshell with GTB 3x10x3" RLE  Seated EOM Hip IR and ER with GTB 3x10x3" RLE  Fire Hydrant GTB 3x10x3" RLE  SLS on foam 3x30" RLE      Katelin received therapeutic exercises to develop strength, endurance, ROM, flexibility, posture, and core stabilization for 23 minutes including:   Prone Hip Flexors + Quad stretch 4x30"  Matrix Knee Flexion 30# 3x10  Lateral steps GTB @ankles x 2 laps   Monster walk GTB @ ankles x 2 laps  Reverse Monster walk GTB @ ankles x 1 lap          Supine Hip fall out stretch 5x10" RLE  SL IT band stretch 3x30" lying on B sides   SL QL stretch 4x30" lying on Left side  Seated Piriformis stretch 4x30"    Katelin received the following manual therapy techniques: Myofacial release and Soft tissue Mobilization were applied to the: Right hip/IT band/Quad for 00 minutes, including:  PROM DAVONTE   Myofascial release TFL  IASTM roller on lateral quad/ " thigh       Home Exercises Provided and Patient Education Provided     Written Home Exercises Provided: Patient instructed to cont prior HEP.  Exercises were reviewed and Katelin was able to demonstrate them prior to the end of the session.  Katelin demonstrated good  understanding of the education provided.     Education provided:   - HEP      Assessment   Pt's hip and knee pain was low. Only painful exercise was walking lunges. She demonstrated dynamic knee valgus as well, so it was regressed to split squats with cueing for knee-toe alignment with no issues in the knee. Will continue to progress as tolerated to PLOF.   Overall, Katelin Is progressing well towards her goals.     Pt prognosis is Good.     Pt will continue to benefit from skilled outpatient physical therapy to address the deficits listed in the problem list box on initial evaluation, provide pt/family education and to maximize pt's level of independence in the home and community environment.     Pt's spiritual, cultural and educational needs considered and pt agreeable to plan of care and goals.     Anticipated barriers to physical therapy: none    Goals:   Short Term Goals:  2-3 weeks  1.Report decreased in pain at worse less than <   / =  4  /10  to increase tolerance for functional activities. MET  2. Increased B Hip  MMT 1/2  to increase tolerance for ADL and work activities. Partially MET  3. Pt to tolerate HEP to improve ROM and independence with ADL's. MET     Long Term Goals:  6-8 weeks  1.Report decreased in pain at worse less than  <   / =  0  /10  to increase tolerance for functional mobility. On going  2.Increased B hip MMT 1 grade  to increase tolerance for ADL and work activities.On going  3. Pt will scores report 5% limitation on FOTO hip survey  to demonstrate increase in LE function with every day tasks. On going  4. Pt to be Independent with HEP to improve ROM and independence with ADL's. On going  5. Pt will be able to walk on uneven  surfaces for 1 hr with no difficulty in order to show improved tolerance for cutting grass. Ongoing     Plan     Plan of care Certification: 12/17/2024 to 3/17/2025.  UPDATED to 5/17/2025     Outpatient Physical Therapy 2 times weekly for 8 weeks to include the following interventions: Gait Training, Manual Therapy, Moist Heat/ Ice, Neuromuscular Re-ed, Patient Education, Self Care, Therapeutic Activities, and Therapeutic Exercise. Dry needmaría elena Woods Jr, PT   4/22/2025

## 2025-04-24 ENCOUNTER — CLINICAL SUPPORT (OUTPATIENT)
Dept: REHABILITATION | Facility: HOSPITAL | Age: 40
End: 2025-04-24
Payer: COMMERCIAL

## 2025-04-24 DIAGNOSIS — R68.89 DECREASED FUNCTIONAL ACTIVITY TOLERANCE: ICD-10-CM

## 2025-04-24 DIAGNOSIS — M25.651 HIP STIFFNESS, RIGHT: ICD-10-CM

## 2025-04-24 DIAGNOSIS — R53.1 WEAKNESS: Primary | ICD-10-CM

## 2025-04-24 PROCEDURE — 97110 THERAPEUTIC EXERCISES: CPT | Mod: PN

## 2025-04-24 PROCEDURE — 97112 NEUROMUSCULAR REEDUCATION: CPT | Mod: PN

## 2025-04-24 NOTE — PROGRESS NOTES
[facility]   Physical Therapy Treatment Note     Name: Katelin Head  Clinic Number: [id number]    Therapy Diagnosis:   Encounter Diagnoses   Name Primary?    Weakness Yes    Decreased functional activity tolerance     Hip stiffness, right            Physician: Moisés Shi MD    Visit Date: 4/24/2025    Physician Orders: PT Eval and Treat   Medical Diagnosis from Referral: M25.551 (ICD-10-CM) - Right hip pain   Evaluation Date: 12/17/2024  Authorization Period Expiration: 12/31/2025  Plan of Care Expiration: 3/17/2025 Updated to 5/17/2025  Visit # / Visits authorized: 19/25 Progress Note Due: 5/15/2025   FOTO: 1/ 1     Precautions: Standard    Visit #: 19 of 25  PTA Visit #: 1/5  Time In: 9:00 am  Time Out: 10:00 am   Total Billable Time: 53 minutes    Subjective     Pt reports: hip and knee are doing good and she cut grass over the weekend without any issues.   She  n/a  compliant with home exercise program.  Response to previous treatment: improving  Functional change: ongoing    Pain: not taken/10  Location: 0/10 Right hip, 1/10 Right knee    Objective                     Right LE   Left LE       Knee extension: 4+/5 Knee extension: 5/5     Knee flexion: 5/5 Knee flexion: 5/5     Hip flexion: 5/5 Hip flexion: 5/5     Hip Internal Rotation:  5/5    Hip Internal Rotation: 5/5      Hip External Rotation: 5/5    Hip External Rotation: 4+/5      Hip extension:  4+/5 Hip extension: 4+/5     Hip abduction: 4+/5 Hip abduction: 4+/5     Hip adduction: NT Hip adduction: NT     Ankle dorsiflexion: 5/5 Ankle dorsiflexion: 5/5     Ankle plantarflexion: 5/5 Ankle plantarflexion: 5/5      Patellar tendon TTP, suprapatellar TTP    Katelin participated in dynamic functional therapeutic activities to improve functional performance for 5 minutes, including:  Treadmill speed 2.1, 10 min (to improve BLE strength, endurance, and standing/walking tolerance), inclined 4  Sled push +20# x 3 laps  Walking Lunges  "with 2x10# KB hold on each hand 1 lap (painful knees; regressed to split squat )      Shuttle Hip extension shuttle (donkey kick) +2 Black bands 2 x10 bilateral   Shuttle SL +3 Black bands 3x10 each side  Rebounder SL RLE 2x10 +Yellow ball  Wall sit  1 x 30 sec hold (for HEP)  Sit to Stand from 20" 3 x10 (tapping butt) building squat mechanics holding +25# round weight  Matrix Hip Abd 30# 3x10  Matrix Hip Add 30# 3x10   +Trap bar deadlift 2x10     Katelin participated in neuromuscular re-education activities to improve: Quad motor control, Balance, Coordination, Proprioception, Posture, and muscle control for 15 minutes. The following activities were included:  [place] squat isometrics hold 4 x 30" teal band  TKE R knee 3x10 x 5" Teal band   Matrix eccentric R knee ext ( 2 up, 1 down ) 15# 3 x 10   +Split squats 2 x 10 10#     Hamstring sweeps 1 lap  Quad pull 1 lap  Glute burners 2 x 10 (abd, posteriolateral, ext) with +3# ankle weight   Hooklying Bridges DL with GTB around knees 10x  Hooklying Bridges SL with GTB around knees 2x10 each leg  SL Clamshell with GTB 3x10x3" RLE  Seated EOM Hip IR and ER with GTB 3x10x3" RLE  Fire Hydrant GTB 3x10x3" RLE  SLS on foam 3x30" RLE      Katelin received therapeutic exercises to develop strength, endurance, ROM, flexibility, posture, and core stabilization for 38 minutes including:   Upright Bike 10 min level 2  +HS sweeps x 1 lap   +Quad pulls x 1 lap  +Dynamic adductor stretch (side to side lateral lunge) x lap  Prone Hip Flexors + Quad stretch 4x30" (end of session)  Matrix Knee Flexion 30# 3x10  Lateral steps GTB @ankles x 2 laps   Monster walk GTB @ ankles x 2 laps  Reverse Monster walk GTB @ ankles x 1 lap      Supine Hip fall out stretch 5x10" RLE  SL IT band stretch 3x30" lying on B sides   SL QL stretch 4x30" lying on Left side  Seated Piriformis stretch 4x30"    Katelin received the following manual therapy techniques: Myofacial release and Soft tissue Mobilization were " applied to the: Right hip/IT band/Quad for 00 minutes, including:  PROM DAVONTE   Myofascial release TFL  IASTM roller on lateral quad/ thigh       Home Exercises Provided and Patient Education Provided     Written Home Exercises Provided: Patient instructed to cont prior HEP.  Exercises were reviewed and Katelin was able to demonstrate them prior to the end of the session.  Katelin demonstrated good  understanding of the education provided.     Education provided:   - HEP      Assessment   Added dynamic mobility to pt's program and continued with strengthening exercises. No adverse effects from today's session. Will continue to progress as tolerated to PLOF.   Overall, Katelin Is progressing well towards her goals.     Pt prognosis is Good.     Pt will continue to benefit from skilled outpatient physical therapy to address the deficits listed in the problem list box on initial evaluation, provide pt/family education and to maximize pt's level of independence in the home and community environment.     Pt's spiritual, cultural and educational needs considered and pt agreeable to plan of care and goals.     Anticipated barriers to physical therapy: none    Goals:   Short Term Goals:  2-3 weeks  1.Report decreased in pain at worse less than <   / =  4  /10  to increase tolerance for functional activities. MET  2. Increased B Hip  MMT 1/2  to increase tolerance for ADL and work activities. Partially MET  3. Pt to tolerate HEP to improve ROM and independence with ADL's. MET     Long Term Goals:  6-8 weeks  1.Report decreased in pain at worse less than  <   / =  0  /10  to increase tolerance for functional mobility. On going  2.Increased B hip MMT 1 grade  to increase tolerance for ADL and work activities.On going  3. Pt will scores report 5% limitation on FOTO hip survey  to demonstrate increase in LE function with every day tasks. On going  4. Pt to be Independent with HEP to improve ROM and independence with ADL's. On  going  5. Pt will be able to walk on uneven surfaces for 1 hr with no difficulty in order to show improved tolerance for cutting grass. Ongoing     Plan     Plan of care Certification: 12/17/2024 to 3/17/2025.  UPDATED to 5/17/2025     Outpatient Physical Therapy 2 times weekly for 8 weeks to include the following interventions: Gait Training, Manual Therapy, Moist Heat/ Ice, Neuromuscular Re-ed, Patient Education, Self Care, Therapeutic Activities, and Therapeutic Exercise. Dry seun Woods Jr, PT   4/24/2025

## 2025-04-29 ENCOUNTER — CLINICAL SUPPORT (OUTPATIENT)
Dept: REHABILITATION | Facility: HOSPITAL | Age: 40
End: 2025-04-29
Payer: COMMERCIAL

## 2025-04-29 DIAGNOSIS — M25.651 HIP STIFFNESS, RIGHT: ICD-10-CM

## 2025-04-29 DIAGNOSIS — R53.1 WEAKNESS: Primary | ICD-10-CM

## 2025-04-29 DIAGNOSIS — R68.89 DECREASED FUNCTIONAL ACTIVITY TOLERANCE: ICD-10-CM

## 2025-04-29 DIAGNOSIS — M25.551 RIGHT HIP PAIN: ICD-10-CM

## 2025-04-29 PROCEDURE — 97530 THERAPEUTIC ACTIVITIES: CPT | Mod: PN,CQ

## 2025-04-29 PROCEDURE — 97112 NEUROMUSCULAR REEDUCATION: CPT | Mod: PN,CQ

## 2025-04-29 NOTE — PROGRESS NOTES
[facility]   Physical Therapy Treatment Note     Name: Katelin Head  Clinic Number: [id number]    Therapy Diagnosis:   Encounter Diagnoses   Name Primary?    Weakness Yes    Decreased functional activity tolerance     Hip stiffness, right     Right hip pain            Physician: Moisés Shi MD    Visit Date: 4/29/2025    Physician Orders: PT Eval and Treat   Medical Diagnosis from Referral: M25.551 (ICD-10-CM) - Right hip pain   Evaluation Date: 12/17/2024  Authorization Period Expiration: 12/31/2025  Plan of Care Expiration: 3/17/2025 Updated to 5/17/2025  Visit # / Visits authorized: 21/25 Progress Note Due: 5/15/2025   FOTO: 1/ 1     Precautions: Standard    Visit #: 21 of 25  PTA Visit #: 1/5  Time In: 9:00 am  Time Out: 10:00 am   Total Billable Time: 53 minutes    Subjective     Pt reports: hip and knee are doing good, no pain coming in today.   She  n/a  compliant with home exercise program.  Response to previous treatment: improving  Functional change: ongoing    Pain: not taken/10  Location: 0/10 Right hip, 1/10 Right knee    Objective                     Right LE   Left LE       Knee extension: 4+/5 Knee extension: 5/5     Knee flexion: 5/5 Knee flexion: 5/5     Hip flexion: 5/5 Hip flexion: 5/5     Hip Internal Rotation:  5/5    Hip Internal Rotation: 5/5      Hip External Rotation: 5/5    Hip External Rotation: 4+/5      Hip extension:  4+/5 Hip extension: 4+/5     Hip abduction: 4+/5 Hip abduction: 4+/5     Hip adduction: NT Hip adduction: NT     Ankle dorsiflexion: 5/5 Ankle dorsiflexion: 5/5     Ankle plantarflexion: 5/5 Ankle plantarflexion: 5/5      Patellar tendon TTP, suprapatellar TTP    Katelin participated in dynamic functional therapeutic activities to improve functional performance for 40 minutes, including:  Treadmill speed 2.1, 10 min (to improve BLE strength, endurance, and standing/walking tolerance), inclined 4  Upright Bike 10 min level 2  Sled push 20# x 3  "laps  Matrix eccentric R knee ext ( 2 up, 1 down ) 15# 3 x 10  Matrix Knee Flexion 30# 3x10   Lateral steps GTB @ankles x 2 laps   Monster walk GTB @ ankles x 2 laps  Reverse Monster walk GTB @ ankles x 1 lap    Shuttle Hip extension shuttle (donkey kick) +2 Black bands 2 x10 bilateral   Shuttle SL +3 Black bands 3x10 each side  Rebounder SL RLE 2x10 +Yellow ball  Wall sit  1 x 30 sec hold (for HEP)  Sit to Stand from 20" 3 x10 (tapping butt) building squat mechanics holding +25# round weight  Matrix Hip Abd 30# 3x10  Matrix Hip Add 30# 3x10   Trap bar deadlift 2x10     Katelin participated in neuromuscular re-education activities to improve: Quad motor control, Balance, Coordination, Proprioception, Posture, and muscle control for 8 minutes. The following activities were included:  [place] squat isometrics hold 4 x 30" teal band  TKE R knee 3x10 x 5" Teal band   Split squats 2 x 10 10#     Hamstring sweeps 1 lap  Quad pull 1 lap  Glute burners 2 x 10 (abd, posteriolateral, ext) with +3# ankle weight   Hooklying Bridges DL with GTB around knees 10x  Hooklying Bridges SL with GTB around knees 2x10 each leg  SL Clamshell with GTB 3x10x3" RLE  Seated EOM Hip IR and ER with GTB 3x10x3" RLE  Fire Hydrant GTB 3x10x3" RLE  SLS on foam 3x30" RLE      Katelin received therapeutic exercises to develop strength, endurance, ROM, flexibility, posture, and core stabilization for 7 minutes including:   HS sweeps x 1 lap   Quad pulls x 1 lap  Dynamic adductor stretch (side to side lateral lunge) x lap  Prone Hip Flexors + Quad stretch 4x30" (end of session)    Supine Hip fall out stretch 5x10" RLE  SL IT band stretch 3x30" lying on B sides   SL QL stretch 4x30" lying on Left side  Seated Piriformis stretch 4x30"    Katelin received the following manual therapy techniques: Myofacial release and Soft tissue Mobilization were applied to the: Right hip/IT band/Quad for 00 minutes, including:  PROM DAVONTE   Myofascial release TFL  IASTM roller " on lateral quad/ thigh       Home Exercises Provided and Patient Education Provided     Written Home Exercises Provided: Patient instructed to cont prior HEP.  Exercises were reviewed and Katelin was able to demonstrate them prior to the end of the session.  Katelin demonstrated good  understanding of the education provided.     Education provided:   - HEP      Assessment   Patient reports hip and knee are doing good, no pain coming in today. Continued with strengthening exercises. No adverse effects from today's session. Will continue to progress as tolerated to PLOF. Possibly consider discharge next visit if cont doing good with no pain.  Overall, Katelin Is progressing well towards her goals.     Pt prognosis is Good.     Pt will continue to benefit from skilled outpatient physical therapy to address the deficits listed in the problem list box on initial evaluation, provide pt/family education and to maximize pt's level of independence in the home and community environment.     Pt's spiritual, cultural and educational needs considered and pt agreeable to plan of care and goals.     Anticipated barriers to physical therapy: none    Goals:   Short Term Goals:  2-3 weeks  1.Report decreased in pain at worse less than <   / =  4  /10  to increase tolerance for functional activities. MET  2. Increased B Hip  MMT 1/2  to increase tolerance for ADL and work activities. Partially MET  3. Pt to tolerate HEP to improve ROM and independence with ADL's. MET     Long Term Goals:  6-8 weeks  1.Report decreased in pain at worse less than  <   / =  0  /10  to increase tolerance for functional mobility. On going  2.Increased B hip MMT 1 grade  to increase tolerance for ADL and work activities.On going  3. Pt will scores report 5% limitation on FOTO hip survey  to demonstrate increase in LE function with every day tasks. On going  4. Pt to be Independent with HEP to improve ROM and independence with ADL's. On going  5. Pt will be able  to walk on uneven surfaces for 1 hr with no difficulty in order to show improved tolerance for cutting grass. Ongoing     Plan     Plan of care Certification: 12/17/2024 to 3/17/2025.  UPDATED to 5/17/2025     Outpatient Physical Therapy 2 times weekly for 8 weeks to include the following interventions: Gait Training, Manual Therapy, Moist Heat/ Ice, Neuromuscular Re-ed, Patient Education, Self Care, Therapeutic Activities, and Therapeutic Exercise. Dry needling    Gómez To, PTA   4/29/2025

## 2025-05-01 ENCOUNTER — CLINICAL SUPPORT (OUTPATIENT)
Dept: REHABILITATION | Facility: HOSPITAL | Age: 40
End: 2025-05-01
Payer: COMMERCIAL

## 2025-05-01 DIAGNOSIS — M25.561 RIGHT KNEE PAIN, UNSPECIFIED CHRONICITY: ICD-10-CM

## 2025-05-01 PROCEDURE — 97530 THERAPEUTIC ACTIVITIES: CPT | Mod: PN

## 2025-05-01 PROCEDURE — 97110 THERAPEUTIC EXERCISES: CPT | Mod: PN

## 2025-05-01 PROCEDURE — 97112 NEUROMUSCULAR REEDUCATION: CPT | Mod: PN

## 2025-05-01 NOTE — PROGRESS NOTES
[facility]   Physical Therapy Treatment Note     Name: Katelin Head  Clinic Number: [id number]    Therapy Diagnosis:   Encounter Diagnosis   Name Primary?    Right knee pain, unspecified chronicity            Physician: Moisés Shi MD    Visit Date: 5/1/2025    Physician Orders: PT Eval and Treat   Medical Diagnosis from Referral: M25.551 (ICD-10-CM) - Right hip pain   Evaluation Date: 12/17/2024  Authorization Period Expiration: 12/31/2025  Plan of Care Expiration: 3/17/2025 Updated to 5/17/2025  Visit # / Visits authorized: 22/25 Progress Note Due: 5/15/2025   FOTO: 1/ 1     Precautions: Standard    Visit #: 22 of 25  PTA Visit #: 1/5  Time In: 9:00 am  Time Out: 10:00 am   Total Billable Time: 53 minutes    Subjective     Pt reports: doing well with no changes or setbacks.    She  n/a  compliant with home exercise program.  Response to previous treatment: improving  Functional change: ongoing    Pain: not taken/10  Location: 0/10 Right hip, 1/10 Right knee    Objective                     Right LE   Left LE       Knee extension: 4+/5 Knee extension: 5/5     Knee flexion: 5/5 Knee flexion: 5/5     Hip flexion: 5/5 Hip flexion: 5/5     Hip Internal Rotation:  5/5    Hip Internal Rotation: 5/5      Hip External Rotation: 5/5    Hip External Rotation: 4+/5      Hip extension:  4+/5 Hip extension: 4+/5     Hip abduction: 4+/5 Hip abduction: 4+/5     Hip adduction: NT Hip adduction: NT     Ankle dorsiflexion: 5/5 Ankle dorsiflexion: 5/5     Ankle plantarflexion: 5/5 Ankle plantarflexion: 5/5      Patellar tendon TTP, suprapatellar TTP    Katelin participated in dynamic functional therapeutic activities to improve functional performance for 35 minutes, including:  Treadmill speed 2.1, 10 min (to improve BLE strength, endurance, and standing/walking tolerance), inclined 4  Upright Bike 10 min level 2  Sled push 20# x 3 laps  Matrix eccentric R knee ext ( 2 up, 1 down ) 15# 3 x 10  Matrix Knee  "Flexion 30# 3x10   Lateral steps GTB @ankles x 2 laps   Monster walk GTB @ ankles x 2 laps  Reverse Monster walk GTB @ ankles x 1 lap    Shuttle Hip extension shuttle (donkey kick) +2 Black bands 2 x10 bilateral   Shuttle SL +3 Black bands 3x10 each side  Rebounder SL RLE 2x10 +Yellow ball  Wall sit  1 x 30 sec hold (for HEP)  Sit to Stand from 20" 3 x10 (tapping butt) building squat mechanics holding +25# round weight  Matrix Hip Abd 30# 3x10  Matrix Hip Add 30# 3x10   Trap bar deadlift 2x10     Katelin participated in neuromuscular re-education activities to improve: Quad motor control, Balance, Coordination, Proprioception, Posture, and muscle control for 8 minutes. The following activities were included:  [place]ian squat isometrics hold 4 x 30" teal band  TKE R knee 3x10 x 5" Teal band   Split squats 2 x 10 10#     Hamstring sweeps 1 lap  Quad pull 1 lap  Glute burners 2 x 10 (abd, posteriolateral, ext) with +3# ankle weight   Hooklying Bridges DL with GTB around knees 10x  Hooklying Bridges SL with GTB around knees 2x10 each leg  SL Clamshell with GTB 3x10x3" RLE  Seated EOM Hip IR and ER with GTB 3x10x3" RLE  Fire Hydrant GTB 3x10x3" RLE  SLS on foam 3x30" RLE      Katelin received therapeutic exercises to develop strength, endurance, ROM, flexibility, posture, and core stabilization for 10 minutes including:   HS sweeps x 1 lap   Quad pulls x 1 lap  Dynamic adductor stretch (side to side lateral lunge) x lap  Prone Hip Flexors + Quad stretch 4x30" (end of session)    Supine Hip fall out stretch 5x10" RLE  SL IT band stretch 3x30" lying on B sides   SL QL stretch 4x30" lying on Left side  Seated Piriformis stretch 4x30"    Katelin received the following manual therapy techniques: Myofacial release and Soft tissue Mobilization were applied to the: Right hip/IT band/Quad for 00 minutes, including:  PROM DAVONTE   Myofascial release TFL  IASTM roller on lateral quad/ thigh       Home Exercises Provided and Patient " Education Provided     Written Home Exercises Provided: Patient instructed to cont prior HEP.  Exercises were reviewed and Katelin was able to demonstrate them prior to the end of the session.  Katelin demonstrated good  understanding of the education provided.     Education provided:   - HEP      Assessment   Pt continues to do well with no adverse effects from therapy. Discussed discharge in the next session or two as long as there are no setbacks.   Overall, Katelin Is progressing well towards her goals.     Pt prognosis is Good.     Pt will continue to benefit from skilled outpatient physical therapy to address the deficits listed in the problem list box on initial evaluation, provide pt/family education and to maximize pt's level of independence in the home and community environment.     Pt's spiritual, cultural and educational needs considered and pt agreeable to plan of care and goals.     Anticipated barriers to physical therapy: none    Goals:   Short Term Goals:  2-3 weeks  1.Report decreased in pain at worse less than <   / =  4  /10  to increase tolerance for functional activities. MET  2. Increased B Hip  MMT 1/2  to increase tolerance for ADL and work activities. Partially MET  3. Pt to tolerate HEP to improve ROM and independence with ADL's. MET     Long Term Goals:  6-8 weeks  1.Report decreased in pain at worse less than  <   / =  0  /10  to increase tolerance for functional mobility. On going  2.Increased B hip MMT 1 grade  to increase tolerance for ADL and work activities.On going  3. Pt will scores report 5% limitation on FOTO hip survey  to demonstrate increase in LE function with every day tasks. On going  4. Pt to be Independent with HEP to improve ROM and independence with ADL's. On going  5. Pt will be able to walk on uneven surfaces for 1 hr with no difficulty in order to show improved tolerance for cutting grass. Ongoing     Plan     Plan of care Certification: 12/17/2024 to 3/17/2025.  UPDATED  to 5/17/2025     Outpatient Physical Therapy 2 times weekly for 8 weeks to include the following interventions: Gait Training, Manual Therapy, Moist Heat/ Ice, Neuromuscular Re-ed, Patient Education, Self Care, Therapeutic Activities, and Therapeutic Exercise. Evelina Woods Jr, PT   5/1/2025

## 2025-05-05 ENCOUNTER — CLINICAL SUPPORT (OUTPATIENT)
Dept: REHABILITATION | Facility: HOSPITAL | Age: 40
End: 2025-05-05
Payer: COMMERCIAL

## 2025-05-05 DIAGNOSIS — M25.651 HIP STIFFNESS, RIGHT: ICD-10-CM

## 2025-05-05 DIAGNOSIS — R68.89 DECREASED FUNCTIONAL ACTIVITY TOLERANCE: ICD-10-CM

## 2025-05-05 DIAGNOSIS — R53.1 WEAKNESS: Primary | ICD-10-CM

## 2025-05-05 PROCEDURE — 97110 THERAPEUTIC EXERCISES: CPT | Mod: PN

## 2025-05-05 PROCEDURE — 97530 THERAPEUTIC ACTIVITIES: CPT | Mod: PN

## 2025-05-05 NOTE — PROGRESS NOTES
[facility]   Physical Therapy Treatment Note     Name: Katelin Head  Clinic Number: [id number]    Therapy Diagnosis:   Encounter Diagnoses   Name Primary?    Weakness Yes    Decreased functional activity tolerance     Hip stiffness, right            Physician: Moisés Shi MD    Visit Date: 5/5/2025    Physician Orders: PT Eval and Treat   Medical Diagnosis from Referral: M25.551 (ICD-10-CM) - Right hip pain   Evaluation Date: 12/17/2024  Authorization Period Expiration: 12/31/2025  Plan of Care Expiration: 3/17/2025 Updated to 5/17/2025  Visit # / Visits authorized: 23/25 Progress Note Due: 5/15/2025   FOTO: 1/ 1     Precautions: Standard    Visit #: 22 of 25  PTA Visit #: 1/5  Time In: 4:00 pm  Time Out: 5:00 pm   Total Billable Time: 53 minutes    Subjective     Pt reports: doing well with no changes or setbacks.    She n/a compliant with home exercise program.  Response to previous treatment: improving  Functional change: ongoing    Pain: not taken/10  Location: 0/10 Right hip, 1/10 Right knee    Objective                     Right LE   Left LE       Knee extension: 4+/5 Knee extension: 5/5     Knee flexion: 5/5 Knee flexion: 5/5     Hip flexion: 5/5 Hip flexion: 5/5     Hip Internal Rotation:  5/5    Hip Internal Rotation: 5/5      Hip External Rotation: 5/5    Hip External Rotation: 4+/5      Hip extension:  4+/5 Hip extension: 4+/5     Hip abduction: 4+/5 Hip abduction: 4+/5     Hip adduction: NT Hip adduction: NT     Ankle dorsiflexion: 5/5 Ankle dorsiflexion: 5/5     Ankle plantarflexion: 5/5 Ankle plantarflexion: 5/5      Patellar tendon TTP, suprapatellar TTP    Katelin participated in dynamic functional therapeutic activities to improve functional performance for 38 minutes, including:  Treadmill speed 2.1, 10 min (to improve BLE strength, endurance, and standing/walking tolerance), inclined 4  Upright Bike 10 min level 2  Sled push 20# x 3 laps  Matrix eccentric R knee ext ( 2  "up, 1 down ) 25# 3 x 10  Matrix Knee Flexion 30# 3x10   Lateral steps GTB @ankles x 2 laps   Monster walk GTB @ ankles x 2 laps  Reverse Monster walk GTB @ ankles x 1 lap    Shuttle Hip extension shuttle (donkey kick) +2 Black bands 2 x10 bilateral   Shuttle SL +3 Black bands 3x10 each side  Rebounder SL RLE 2x10 +Yellow ball  Wall sit  1 x 30 sec hold (for HEP)  Sit to Stand from 20" 3 x10 (tapping butt) building squat mechanics holding +25# round weight  Matrix Hip Abd 30# 3x10  Matrix Hip Add 30# 3x10   Trap bar deadlift 2x10     Katelin participated in neuromuscular re-education activities to improve: Quad motor control, Balance, Coordination, Proprioception, Posture, and muscle control for 00 minutes. The following activities were included:       Hamstring sweeps 1 lap  Quad pull 1 lap  Glute burners 2 x 10 (abd, posteriolateral, ext) with +3# ankle weight   Hooklying Bridges DL with GTB around knees 10x  Hooklying Bridges SL with GTB around knees 2x10 each leg  SL Clamshell with GTB 3x10x3" RLE  Seated EOM Hip IR and ER with GTB 3x10x3" RLE  Fire Hydrant GTB 3x10x3" RLE  SLS on foam 3x30" RLE      Katelin received therapeutic exercises to develop strength, endurance, ROM, flexibility, posture, and core stabilization for 15 minutes including:   HS sweeps x 1 lap   Quad pulls x 1 lap  Dynamic adductor stretch (side to side lateral lunge) x lap  Prone Hip Flexors + Quad stretch 4x30" (end of session)-- not done today  [place] squat isometrics hold 4 x 30" teal band  Split squats 2 x 10 10#    TKE R knee 3x10 x 5" Teal band   Supine Hip fall out stretch 5x10" RLE  SL IT band stretch 3x30" lying on B sides   SL QL stretch 4x30" lying on Left side  Seated Piriformis stretch 4x30"    Katelin received the following manual therapy techniques: Myofacial release and Soft tissue Mobilization were applied to the: Right hip/IT band/Quad for 00 minutes, including:  PROM DAVONTE   Myofascial release TFL  IASTM roller on lateral quad/ " thigh       Home Exercises Provided and Patient Education Provided     Written Home Exercises Provided: Patient instructed to cont prior HEP.  Exercises were reviewed and Katelin was able to demonstrate them prior to the end of the session.  Katelin demonstrated good  understanding of the education provided.     Education provided:   - HEP      Assessment   Pt continues to do well with no adverse effects from therapy. Discussed discharge in the next session or two as long as there are no setbacks. Pt is waiting to see how she responds to cutting grass (reason for her flare up) on Friday to continue as planned with discharge.     Overall, Katelin Is progressing well towards her goals.     Pt prognosis is Good.     Pt will continue to benefit from skilled outpatient physical therapy to address the deficits listed in the problem list box on initial evaluation, provide pt/family education and to maximize pt's level of independence in the home and community environment.     Pt's spiritual, cultural and educational needs considered and pt agreeable to plan of care and goals.     Anticipated barriers to physical therapy: none    Goals:   Short Term Goals:  2-3 weeks  1.Report decreased in pain at worse less than <   / =  4  /10  to increase tolerance for functional activities. MET  2. Increased B Hip  MMT 1/2  to increase tolerance for ADL and work activities. Partially MET  3. Pt to tolerate HEP to improve ROM and independence with ADL's. MET     Long Term Goals:  6-8 weeks  1.Report decreased in pain at worse less than  <   / =  0  /10  to increase tolerance for functional mobility. On going  2.Increased B hip MMT 1 grade  to increase tolerance for ADL and work activities.On going  3. Pt will scores report 5% limitation on FOTO hip survey  to demonstrate increase in LE function with every day tasks. On going  4. Pt to be Independent with HEP to improve ROM and independence with ADL's. On going  5. Pt will be able to walk on  uneven surfaces for 1 hr with no difficulty in order to show improved tolerance for cutting grass. Ongoing     Plan     Plan of care Certification: 12/17/2024 to 3/17/2025.  UPDATED to 5/17/2025     Outpatient Physical Therapy 2 times weekly for 8 weeks to include the following interventions: Gait Training, Manual Therapy, Moist Heat/ Ice, Neuromuscular Re-ed, Patient Education, Self Care, Therapeutic Activities, and Therapeutic Exercise. Dry needling    Ross Woods Jr, PT   5/5/2025

## 2025-05-07 ENCOUNTER — CLINICAL SUPPORT (OUTPATIENT)
Dept: REHABILITATION | Facility: HOSPITAL | Age: 40
End: 2025-05-07
Payer: COMMERCIAL

## 2025-05-07 DIAGNOSIS — M25.651 HIP STIFFNESS, RIGHT: ICD-10-CM

## 2025-05-07 DIAGNOSIS — R53.1 WEAKNESS: Primary | ICD-10-CM

## 2025-05-07 DIAGNOSIS — R68.89 DECREASED FUNCTIONAL ACTIVITY TOLERANCE: ICD-10-CM

## 2025-05-07 PROCEDURE — 97110 THERAPEUTIC EXERCISES: CPT | Mod: PN

## 2025-05-07 PROCEDURE — 97530 THERAPEUTIC ACTIVITIES: CPT | Mod: PN

## 2025-05-07 NOTE — PROGRESS NOTES
[facility]   Physical Therapy Treatment Note     Name: Katelin Head  Clinic Number: [id number]    Therapy Diagnosis:   Encounter Diagnoses   Name Primary?    Weakness Yes    Decreased functional activity tolerance     Hip stiffness, right              Physician: Moisés Shi MD    Visit Date: 5/7/2025    Physician Orders: PT Eval and Treat   Medical Diagnosis from Referral: M25.551 (ICD-10-CM) - Right hip pain   Evaluation Date: 12/17/2024  Authorization Period Expiration: 12/31/2025  Plan of Care Expiration: 3/17/2025 Updated to 5/17/2025  Visit # / Visits authorized: 23/25 Progress Note Due: 5/15/2025   FOTO: 1/ 1     Precautions: Standard    Visit #: 22 of 25  PTA Visit #: 1/5  Time In: 9:00 am  Time Out: 10:00 am   Total Billable Time: 53 minutes    Subjective     Pt reports: minor pain after cutting grass yesterday but it was self- managed with stretching and home exercises.     She n/a compliant with home exercise program.  Response to previous treatment: improving  Functional change: ongoing    Pain: not taken/10  Location: 0/10 Right hip, 1/10 Right knee    Objective                     Right LE   Left LE       Knee extension: 4+/5 Knee extension: 5/5     Knee flexion: 5/5 Knee flexion: 5/5     Hip flexion: 5/5 Hip flexion: 5/5     Hip Internal Rotation:  5/5    Hip Internal Rotation: 5/5      Hip External Rotation: 5/5    Hip External Rotation: 4+/5      Hip extension:  4+/5 Hip extension: 4+/5     Hip abduction: 4+/5 Hip abduction: 4+/5     Hip adduction: NT Hip adduction: NT     Ankle dorsiflexion: 5/5 Ankle dorsiflexion: 5/5     Ankle plantarflexion: 5/5 Ankle plantarflexion: 5/5      Patellar tendon TTP, suprapatellar TTP    Katelin participated in dynamic functional therapeutic activities to improve functional performance for 38 minutes, including:  Treadmill speed 2.1, 10 min (to improve BLE strength, endurance, and standing/walking tolerance), inclined 4  Upright Bike 10  "min level 2  Sled push 35# x 3 laps  Matrix eccentric R knee ext ( 2 up, 1 down ) 25# 3 x 10  Matrix Knee Flexion 30# 3x10   Lateral steps GTB @ankles x 2 laps   Monster walk GTB @ ankles x 2 laps  Reverse Monster walk GTB @ ankles x 1 lap  Trap bar deadlift 2x10     Shuttle Hip extension shuttle (donkey kick) +2 Black bands 2 x10 bilateral   Shuttle SL +3 Black bands 3x10 each side  Rebounder SL RLE 2x10 +Yellow ball  Wall sit  1 x 30 sec hold (for HEP)  Sit to Stand from 20" 3 x10 (tapping butt) building squat mechanics holding +25# round weight  Matrix Hip Abd 30# 3x10  Matrix Hip Add 30# 3x10       Katelin participated in neuromuscular re-education activities to improve: Quad motor control, Balance, Coordination, Proprioception, Posture, and muscle control for 00 minutes. The following activities were included:       Hamstring sweeps 1 lap  Quad pull 1 lap  Glute burners 2 x 10 (abd, posteriolateral, ext) with +3# ankle weight   Hooklying Bridges DL with GTB around knees 10x  Hooklying Bridges SL with GTB around knees 2x10 each leg  SL Clamshell with GTB 3x10x3" RLE  Seated EOM Hip IR and ER with GTB 3x10x3" RLE  Fire Hydrant GTB 3x10x3" RLE  SLS on foam 3x30" RLE      Katelin received therapeutic exercises to develop strength, endurance, ROM, flexibility, posture, and core stabilization for 15 minutes including:   HS sweeps x 1 lap   Quad pulls x 1 lap  Dynamic adductor stretch (side to side lateral lunge) x lap  Prone Hip Flexors + Quad stretch 4x30" (end of session)  [place] squat isometrics hold 4 x 30" teal band  Trap bar deadlift 3 x 6-8 10# each side   Split squats 2 x 10 10#    TKE R knee 3x10 x 5" Teal band   Supine Hip fall out stretch 5x10" RLE  SL IT band stretch 3x30" lying on B sides   SL QL stretch 4x30" lying on Left side  Seated Piriformis stretch 4x30"    Katelin received the following manual therapy techniques: Myofacial release and Soft tissue Mobilization were applied to the: Right hip/IT " band/Quad for 00 minutes, including:  PROM DAVONTE   Myofascial release TFL  IASTM roller on lateral quad/ thigh       Home Exercises Provided and Patient Education Provided     Written Home Exercises Provided: Patient instructed to cont prior HEP.  Exercises were reviewed and Katelin was able to demonstrate them prior to the end of the session.  Katelin demonstrated good  understanding of the education provided.     Education provided:   - HEP      Assessment   Pt is doing well and plan is to discharge next session. There have been so setbacks to alter discharge planning.      Overall, Katelin Is progressing well towards her goals.     Pt prognosis is Good.     Pt will continue to benefit from skilled outpatient physical therapy to address the deficits listed in the problem list box on initial evaluation, provide pt/family education and to maximize pt's level of independence in the home and community environment.     Pt's spiritual, cultural and educational needs considered and pt agreeable to plan of care and goals.     Anticipated barriers to physical therapy: none    Goals:   Short Term Goals:  2-3 weeks  1.Report decreased in pain at worse less than <   / =  4  /10  to increase tolerance for functional activities. MET  2. Increased B Hip  MMT 1/2  to increase tolerance for ADL and work activities. Partially MET  3. Pt to tolerate HEP to improve ROM and independence with ADL's. MET     Long Term Goals:  6-8 weeks  1.Report decreased in pain at worse less than  <   / =  0  /10  to increase tolerance for functional mobility. On going  2.Increased B hip MMT 1 grade  to increase tolerance for ADL and work activities.On going  3. Pt will scores report 5% limitation on FOTO hip survey  to demonstrate increase in LE function with every day tasks. On going  4. Pt to be Independent with HEP to improve ROM and independence with ADL's. On going  5. Pt will be able to walk on uneven surfaces for 1 hr with no difficulty in order to  show improved tolerance for cutting grass. Ongoing     Plan     Plan of care Certification: 12/17/2024 to 3/17/2025.  UPDATED to 5/17/2025     Outpatient Physical Therapy 2 times weekly for 8 weeks to include the following interventions: Gait Training, Manual Therapy, Moist Heat/ Ice, Neuromuscular Re-ed, Patient Education, Self Care, Therapeutic Activities, and Therapeutic Exercise. Evelina Woods Jr, PT   5/7/2025

## 2025-05-14 ENCOUNTER — CLINICAL SUPPORT (OUTPATIENT)
Dept: REHABILITATION | Facility: HOSPITAL | Age: 40
End: 2025-05-14
Payer: COMMERCIAL

## 2025-05-14 DIAGNOSIS — M25.651 HIP STIFFNESS, RIGHT: ICD-10-CM

## 2025-05-14 DIAGNOSIS — R53.1 WEAKNESS: Primary | ICD-10-CM

## 2025-05-14 DIAGNOSIS — R68.89 DECREASED FUNCTIONAL ACTIVITY TOLERANCE: ICD-10-CM

## 2025-05-14 PROCEDURE — 97530 THERAPEUTIC ACTIVITIES: CPT | Mod: PN

## 2025-05-14 NOTE — PROGRESS NOTES
[facility]   Physical Therapy Treatment Note     Name: Katelin Head  Clinic Number: [id number]    Therapy Diagnosis:   Encounter Diagnoses   Name Primary?    Weakness Yes    Decreased functional activity tolerance     Hip stiffness, right                Physician: Moisés Shi MD    Visit Date: 5/14/2025    Physician Orders: PT Eval and Treat   Medical Diagnosis from Referral: M25.551 (ICD-10-CM) - Right hip pain   Evaluation Date: 12/17/2024  Authorization Period Expiration: 12/31/2025  Plan of Care Expiration: 3/17/2025 Updated to 5/17/2025  Visit # / Visits authorized: 25/25 Progress Note Due: 5/15/2025   FOTO: 1/ 1     Precautions: Standard    Visit #: 25 of 25  PTA Visit #: 1/5  Time In: 8:45 am  Time Out: 9:40 am   Total Billable Time: 55 minutes    Subjective     Pt reports: doing well and she was able to cut grass without any issues. She is ready to be discharged today.   She n/a compliant with home exercise program.  Response to previous treatment: improving  Functional change: ongoing    Pain: not taken/10  Location: 0/10 Right hip, 1/10 Right knee    Objective                     Right LE   Left LE       Knee extension: 4+/5 Knee extension: 5/5     Knee flexion: 5/5 Knee flexion: 5/5     Hip flexion: 5/5 Hip flexion: 5/5     Hip Internal Rotation:  5/5    Hip Internal Rotation: 5/5      Hip External Rotation: 5/5    Hip External Rotation: 4+/5      Hip extension:  4+/5 Hip extension: 4+/5     Hip abduction: 4+/5 Hip abduction: 4+/5     Hip adduction: NT Hip adduction: NT     Ankle dorsiflexion: 5/5 Ankle dorsiflexion: 5/5     Ankle plantarflexion: 5/5 Ankle plantarflexion: 5/5      Patellar tendon TTP, suprapatellar TTP    Katelin participated in dynamic functional therapeutic activities to improve functional performance for 55 minutes, including:  Treadmill speed 2.1, 10 min (to improve BLE strength, endurance, and standing/walking tolerance), inclined 4  Updated HEP and  "performed in clinic   Upright Bike 10 min level 2  Sled push 35# x 3 laps  Matrix eccentric R knee ext ( 2 up, 1 down ) 25# 3 x 10  Matrix Knee Flexion 30# 3x10   Lateral steps GTB @ankles x 2 laps   Monster walk GTB @ ankles x 2 laps  Reverse Monster walk GTB @ ankles x 1 lap  Trap bar deadlift 2x10     Shuttle Hip extension shuttle (donkey kick) +2 Black bands 2 x10 bilateral   Shuttle SL +3 Black bands 3x10 each side  Rebounder SL RLE 2x10 +Yellow ball  Wall sit  1 x 30 sec hold (for HEP)  Sit to Stand from 20" 3 x10 (tapping butt) building squat mechanics holding +25# round weight  Matrix Hip Abd 30# 3x10  Matrix Hip Add 30# 3x10       Katelin participated in neuromuscular re-education activities to improve: Quad motor control, Balance, Coordination, Proprioception, Posture, and muscle control for 00 minutes. The following activities were included:       Hamstring sweeps 1 lap  Quad pull 1 lap  Glute burners 2 x 10 (abd, posteriolateral, ext) with +3# ankle weight   Hooklying Bridges DL with GTB around knees 10x  Hooklying Bridges SL with GTB around knees 2x10 each leg  SL Clamshell with GTB 3x10x3" RLE  Seated EOM Hip IR and ER with GTB 3x10x3" RLE  Fire Hydrant GTB 3x10x3" RLE  SLS on foam 3x30" RLE      Katelin received therapeutic exercises to develop strength, endurance, ROM, flexibility, posture, and core stabilization for 00 minutes including:   HS sweeps x 1 lap   Quad pulls x 1 lap  Dynamic adductor stretch (side to side lateral lunge) x lap  Prone Hip Flexors + Quad stretch 4x30" (end of session)  Portuguese squat isometrics hold 4 x 30" teal band  Trap bar deadlift 3 x 6-8 10# each side   Split squats 2 x 10 10#    TKE R knee 3x10 x 5" Teal band   Supine Hip fall out stretch 5x10" RLE  SL IT band stretch 3x30" lying on B sides   SL QL stretch 4x30" lying on Left side  Seated Piriformis stretch 4x30"    Katelin received the following manual therapy techniques: Myofacial release and Soft tissue Mobilization were " applied to the: Right hip/IT band/Quad for 00 minutes, including:  PROM DAVONTE   Myofascial release TFL  IASTM roller on lateral quad/ thigh       Home Exercises Provided and Patient Education Provided     Written Home Exercises Provided: Patient instructed to cont prior HEP.  Exercises were reviewed and Katelin was able to demonstrate them prior to the end of the session.  Katelin demonstrated good  understanding of the education provided.     Education provided:   - HEP      Assessment   Pt has been discharged due to significant improvement and no more pain. Pt given updated HEP with return demonstration of all exercises in clinic.      Overall, Katelin Is progressing well towards her goals.     Pt prognosis is Good.     Pt will continue to benefit from skilled outpatient physical therapy to address the deficits listed in the problem list box on initial evaluation, provide pt/family education and to maximize pt's level of independence in the home and community environment.     Pt's spiritual, cultural and educational needs considered and pt agreeable to plan of care and goals.     Anticipated barriers to physical therapy: none    Goals:   Short Term Goals:  2-3 weeks  1.Report decreased in pain at worse less than <   / =  4  /10  to increase tolerance for functional activities. MET  2. Increased B Hip  MMT 1/2  to increase tolerance for ADL and work activities. Partially MET  3. Pt to tolerate HEP to improve ROM and independence with ADL's. MET     Long Term Goals:  6-8 weeks  1.Report decreased in pain at worse less than  <   / =  0  /10  to increase tolerance for functional mobility. MET  2.Increased B hip MMT 1 grade  to increase tolerance for ADL and work activities.MET  3. Pt will scores report 5% limitation on FOTO hip survey  to demonstrate increase in LE function with every day tasks. MET  4. Pt to be Independent with HEP to improve ROM and independence with ADL's. MET  5. Pt will be able to walk on uneven  surfaces for 1 hr with no difficulty in order to show improved tolerance for cutting grass. MET    Plan     Plan of care Certification: 12/17/2024 to 3/17/2025.  UPDATED to 5/17/2025     Outpatient Physical Therapy 2 times weekly for 8 weeks to include the following interventions: Gait Training, Manual Therapy, Moist Heat/ Ice, Neuromuscular Re-ed, Patient Education, Self Care, Therapeutic Activities, and Therapeutic Exercise. Dry needmaría elena Woods Jr, PT   5/14/2025

## 2025-06-16 ENCOUNTER — RESULTS FOLLOW-UP (OUTPATIENT)
Dept: HEMATOLOGY/ONCOLOGY | Facility: HOSPITAL | Age: 40
End: 2025-06-16

## 2025-06-16 ENCOUNTER — LAB VISIT (OUTPATIENT)
Dept: LAB | Facility: HOSPITAL | Age: 40
End: 2025-06-16
Attending: STUDENT IN AN ORGANIZED HEALTH CARE EDUCATION/TRAINING PROGRAM
Payer: COMMERCIAL

## 2025-06-16 DIAGNOSIS — D50.0 IRON DEFICIENCY ANEMIA DUE TO CHRONIC BLOOD LOSS: ICD-10-CM

## 2025-06-16 LAB
ABSOLUTE EOSINOPHIL (OHS): 0.39 K/UL
ABSOLUTE MONOCYTE (OHS): 0.72 K/UL (ref 0.3–1)
ABSOLUTE NEUTROPHIL COUNT (OHS): 6.51 K/UL (ref 1.8–7.7)
BASOPHILS # BLD AUTO: 0.06 K/UL
BASOPHILS NFR BLD AUTO: 0.6 %
ERYTHROCYTE [DISTWIDTH] IN BLOOD BY AUTOMATED COUNT: 15.3 % (ref 11.5–14.5)
FERRITIN SERPL-MCNC: 35 NG/ML (ref 20–300)
HCT VFR BLD AUTO: 44.9 % (ref 37–48.5)
HGB BLD-MCNC: 14.6 GM/DL (ref 12–16)
IMM GRANULOCYTES # BLD AUTO: 0.03 K/UL (ref 0–0.04)
IMM GRANULOCYTES NFR BLD AUTO: 0.3 % (ref 0–0.5)
IRON SATN MFR SERPL: 21 % (ref 20–50)
IRON SERPL-MCNC: 69 UG/DL (ref 30–160)
LYMPHOCYTES # BLD AUTO: 2.43 K/UL (ref 1–4.8)
MCH RBC QN AUTO: 27.2 PG (ref 27–31)
MCHC RBC AUTO-ENTMCNC: 32.5 G/DL (ref 32–36)
MCV RBC AUTO: 84 FL (ref 82–98)
NUCLEATED RBC (/100WBC) (OHS): 0 /100 WBC
PLATELET # BLD AUTO: 311 K/UL (ref 150–450)
PMV BLD AUTO: 8.8 FL (ref 9.2–12.9)
RBC # BLD AUTO: 5.37 M/UL (ref 4–5.4)
RELATIVE EOSINOPHIL (OHS): 3.8 %
RELATIVE LYMPHOCYTE (OHS): 24 % (ref 18–48)
RELATIVE MONOCYTE (OHS): 7.1 % (ref 4–15)
RELATIVE NEUTROPHIL (OHS): 64.2 % (ref 38–73)
TIBC SERPL-MCNC: 330 UG/DL (ref 250–450)
TRANSFERRIN SERPL-MCNC: 223 MG/DL (ref 200–375)
WBC # BLD AUTO: 10.14 K/UL (ref 3.9–12.7)

## 2025-06-16 PROCEDURE — 85025 COMPLETE CBC W/AUTO DIFF WBC: CPT

## 2025-06-16 PROCEDURE — 84466 ASSAY OF TRANSFERRIN: CPT

## 2025-06-16 PROCEDURE — 36415 COLL VENOUS BLD VENIPUNCTURE: CPT | Mod: PO

## 2025-06-16 PROCEDURE — 82728 ASSAY OF FERRITIN: CPT

## (undated) DEVICE — PAD ABD 8X10 STERILE